# Patient Record
Sex: FEMALE | Race: WHITE | NOT HISPANIC OR LATINO | Employment: OTHER | URBAN - NONMETROPOLITAN AREA
[De-identification: names, ages, dates, MRNs, and addresses within clinical notes are randomized per-mention and may not be internally consistent; named-entity substitution may affect disease eponyms.]

---

## 2017-01-11 ENCOUNTER — OFFICE VISIT (OUTPATIENT)
Dept: PODIATRY | Facility: CLINIC | Age: 42
End: 2017-01-11

## 2017-01-11 VITALS
DIASTOLIC BLOOD PRESSURE: 76 MMHG | SYSTOLIC BLOOD PRESSURE: 132 MMHG | WEIGHT: 196 LBS | HEIGHT: 64 IN | HEART RATE: 100 BPM | BODY MASS INDEX: 33.46 KG/M2

## 2017-01-11 DIAGNOSIS — M21.861 GASTROCNEMIUS EQUINUS, RIGHT: ICD-10-CM

## 2017-01-11 DIAGNOSIS — M72.2 PLANTAR FASCIA SYNDROME: Primary | ICD-10-CM

## 2017-01-11 PROBLEM — M21.869 GASTROCNEMIUS EQUINUS: Status: ACTIVE | Noted: 2017-01-11

## 2017-01-11 PROBLEM — M62.469 GASTROCNEMIUS EQUINUS: Status: ACTIVE | Noted: 2017-01-11

## 2017-01-11 PROCEDURE — 99212 OFFICE O/P EST SF 10 MIN: CPT | Performed by: PODIATRIST

## 2017-01-11 NOTE — MR AVS SNAPSHOT
Marjorie Barnett   1/11/2017 10:00 AM   Office Visit    Dept Phone:  [phone]   Encounter #:  [id number]    Provider:  Joss Kirkland DPM   Department:  [facility] P[facility]                Your Full Care Plan              Your Updated Medication List          This list is accurate as of: 1/11/17 10:05 AM.  Always use your most recent med list.                ALPRAZolam 0.5 MG tablet   Commonly known as:  XANAX       amoxicillin-clavulanate 500-125 MG per tablet   Commonly known as:  AUGMENTIN   Take 1 tablet by mouth 2 (Two) Times a Day.       azelastine 0.05 % ophthalmic solution   Commonly known as:  OPTIVAR       bumetanide 1 MG tablet   Commonly known as:  BUMEX   TAKE ONE TABLET DAILY GENERIC FOR BUMEX       cetirizine 10 MG tablet   Commonly known as:  zyrTEC       fluvoxaMINE 100 MG tablet   Commonly known as:  LUVOX       MICROGESTIN FE 1/20 1-20 MG-MCG per tablet   Generic drug:  norethindrone-ethinyl estradiol       sulfamethoxazole-trimethoprim 800-160 MG per tablet   Commonly known as:  BACTRIM DS,SEPTRA DS   Take 1 tablet by mouth 2 (Two) Times a Day.       SYNTHROID 100 MCG tablet   Generic drug:  levothyroxine   TAKE ONE TABLET DAILY (BRAND NAME PER MD)               You Were Diagnosed With        Codes Comments    Plantar fascia syndrome    -  Primary ICD-10-CM: M72.2  ICD-9-CM: 728.71       Instructions    Plantar Fasciitis With Rehab  The plantar fascia is a fibrous, ligament-like, soft-tissue structure that spans the bottom of the foot. Plantar fasciitis, also called heel spur syndrome, is a condition that causes pain in the foot due to inflammation of the tissue.  SYMPTOMS   · Pain and tenderness on the underneath side of the foot.  · Pain that worsens with standing or walking.  CAUSES   Plantar fasciitis is caused by irritation and injury to the plantar fascia on the underneath side of the foot. Common mechanisms of injury include:  · Direct trauma  to bottom of the foot.  · Damage to a small nerve that runs under the foot where the main fascia attaches to the heel bone.  · Stress placed on the plantar fascia due to bone spurs.  RISK INCREASES WITH:   · Activities that place stress on the plantar fascia (running, jumping, pivoting, or cutting).  · Poor strength and flexibility.  · Improperly fitted shoes.  · Tight calf muscles.  · Flat feet.  · Failure to warm-up properly before activity.  · Obesity.  PREVENTION  · Warm up and stretch properly before activity.  · Allow for adequate recovery between workouts.  · Maintain physical fitness:    Strength, flexibility, and endurance.    Cardiovascular fitness.  · Maintain a health body weight.  · Avoid stress on the plantar fascia.  · Wear properly fitted shoes, including arch supports for individuals who have flat feet.  PROGNOSIS   If treated properly, then the symptoms of plantar fasciitis usually resolve without surgery. However, occasionally surgery is necessary.  RELATED COMPLICATIONS   · Recurrent symptoms that may result in a chronic condition.  · Problems of the lower back that are caused by compensating for the injury, such as limping.  · Pain or weakness of the foot during push-off following surgery.  · Chronic inflammation, scarring, and partial or complete fascia tear, occurring more often from repeated injections.  TREATMENT   Treatment initially involves the use of ice and medication to help reduce pain and inflammation. The use of strengthening and stretching exercises may help reduce pain with activity, especially stretches of the Achilles tendon. These exercises may be performed at home or with a therapist. Your caregiver may recommend that you use heel cups of arch supports to help reduce stress on the plantar fascia. Occasionally, corticosteroid injections are given to reduce inflammation. If symptoms persist for greater than 6 months despite non-surgical (conservative), then surgery may be  recommended.   MEDICATION   · If pain medication is necessary, then nonsteroidal anti-inflammatory medications, such as aspirin and ibuprofen, or other minor pain relievers, such as acetaminophen, are often recommended.  · Do not take pain medication within 7 days before surgery.  · Prescription pain relievers may be given if deemed necessary by your caregiver. Use only as directed and only as much as you need.  · Corticosteroid injections may be given by your caregiver. These injections should be reserved for the most serious cases, because they may only be given a certain number of times.  HEAT AND COLD  · Cold treatment (icing) relieves pain and reduces inflammation. Cold treatment should be applied for 10 to 15 minutes every 2 to 3 hours for inflammation and pain and immediately after any activity that aggravates your symptoms. Use ice packs or massage the area with a piece of ice (ice massage).  · Heat treatment may be used prior to performing the stretching and strengthening activities prescribed by your caregiver, physical therapist, or . Use a heat pack or soak the injury in warm water.  SEEK IMMEDIATE MEDICAL CARE IF:  · Treatment seems to offer no benefit, or the condition worsens.  · Any medications produce adverse side effects.  EXERCISES  RANGE OF MOTION (ROM) AND STRETCHING EXERCISES - Plantar Fasciitis (Heel Spur Syndrome)  These exercises may help you when beginning to rehabilitate your injury. Your symptoms may resolve with or without further involvement from your physician, physical therapist or . While completing these exercises, remember:   · Restoring tissue flexibility helps normal motion to return to the joints. This allows healthier, less painful movement and activity.  · An effective stretch should be held for at least 30 seconds.  · A stretch should never be painful. You should only feel a gentle lengthening or release in the stretched tissue.  RANGE OF  MOTION - Toe Extension, Flexion  · Sit with your right / left leg crossed over your opposite knee.  · Grasp your toes and gently pull them back toward the top of your foot. You should feel a stretch on the bottom of your toes and/or foot.  · Hold this stretch for __________ seconds.  · Now, gently pull your toes toward the bottom of your foot. You should feel a stretch on the top of your toes and or foot.  · Hold this stretch for __________ seconds.  Repeat __________ times. Complete this stretch __________ times per day.   RANGE OF MOTION - Ankle Dorsiflexion, Active Assisted  · Remove shoes and sit on a chair that is preferably not on a carpeted surface.  · Place right / left foot under knee. Extend your opposite leg for support.  · Keeping your heel down, slide your right / left foot back toward the chair until you feel a stretch at your ankle or calf. If you do not feel a stretch, slide your bottom forward to the edge of the chair, while still keeping your heel down.  · Hold this stretch for __________ seconds.  Repeat __________ times. Complete this stretch __________ times per day.   STRETCH - Gastroc, Standing  · Place hands on wall.  · Extend right / left leg, keeping the front knee somewhat bent.  · Slightly point your toes inward on your back foot.  · Keeping your right / left heel on the floor and your knee straight, shift your weight toward the wall, not allowing your back to arch.  · You should feel a gentle stretch in the right / left calf. Hold this position for __________ seconds.  Repeat __________ times. Complete this stretch __________ times per day.  STRETCH - Soleus, Standing  · Place hands on wall.  · Extend right / left leg, keeping the other knee somewhat bent.  · Slightly point your toes inward on your back foot.  · Keep your right / left heel on the floor, bend your back knee, and slightly shift your weight over the back leg so that you feel a gentle stretch deep in your back calf.  · Hold  this position for __________ seconds.  Repeat __________ times. Complete this stretch __________ times per day.  STRETCH - Gastrocsoleus, Standing   Note: This exercise can place a lot of stress on your foot and ankle. Please complete this exercise only if specifically instructed by your caregiver.   · Place the ball of your right / left foot on a step, keeping your other foot firmly on the same step.  · Hold on to the wall or a rail for balance.  · Slowly lift your other foot, allowing your body weight to press your heel down over the edge of the step.  · You should feel a stretch in your right / left calf.  · Hold this position for __________ seconds.  · Repeat this exercise with a slight bend in your right / left knee.  Repeat __________ times. Complete this stretch __________ times per day.   STRENGTHENING EXERCISES - Plantar Fasciitis (Heel Spur Syndrome)   These exercises may help you when beginning to rehabilitate your injury. They may resolve your symptoms with or without further involvement from your physician, physical therapist or . While completing these exercises, remember:   · Muscles can gain both the endurance and the strength needed for everyday activities through controlled exercises.  · Complete these exercises as instructed by your physician, physical therapist or . Progress the resistance and repetitions only as guided.  STRENGTH - Towel Curls  · Sit in a chair positioned on a non-carpeted surface.  · Place your foot on a towel, keeping your heel on the floor.  · Pull the towel toward your heel by only curling your toes. Keep your heel on the floor.  · If instructed by your physician, physical therapist or , add ____________________ at the end of the towel.  Repeat __________ times. Complete this exercise __________ times per day.  STRENGTH - Ankle Inversion  · Secure one end of a rubber exercise band/tubing to a fixed object (table, pole). Loop  "the other end around your foot just before your toes.  · Place your fists between your knees. This will focus your strengthening at your ankle.  · Slowly, pull your big toe up and in, making sure the band/tubing is positioned to resist the entire motion.  · Hold this position for __________ seconds.  · Have your muscles resist the band/tubing as it slowly pulls your foot back to the starting position.  Repeat __________ times. Complete this exercises __________ times per day.      This information is not intended to replace advice given to you by your health care provider. Make sure you discuss any questions you have with your health care provider.     Document Released: 12/18/2006 Document Revised: 05/03/2016 Document Reviewed: 04/01/2010  Elsevier Interactive Patient Education ©2016 [employer]       Patient Instructions History      Upcoming Appointments     Visit Type Date Time Department    FOLLOW UP 1/11/2017 10:00 AM [facility]      MyChart Signup     Our records indicate that you have declined [employer] signup. If you would like to sign up for [employer], please email [email or web address] or call [phone] to obtain an activation code.             Other Info from Your Visit           Allergies     Codeine        Reason for Visit     Follow-up right foot pain-seems to be getting better but still flares      Vital Signs     Blood Pressure Pulse Height Weight Body Mass Index Smoking Status    132/76 100 64\" (162.6 cm) 196 lb (88.9 kg) 33.64 kg/m2 Never Smoker      Problems and Diagnoses Noted     Plantar fascia syndrome        "

## 2017-01-11 NOTE — PATIENT INSTRUCTIONS
Plantar Fasciitis With Rehab  The plantar fascia is a fibrous, ligament-like, soft-tissue structure that spans the bottom of the foot. Plantar fasciitis, also called heel spur syndrome, is a condition that causes pain in the foot due to inflammation of the tissue.  SYMPTOMS   · Pain and tenderness on the underneath side of the foot.  · Pain that worsens with standing or walking.  CAUSES   Plantar fasciitis is caused by irritation and injury to the plantar fascia on the underneath side of the foot. Common mechanisms of injury include:  · Direct trauma to bottom of the foot.  · Damage to a small nerve that runs under the foot where the main fascia attaches to the heel bone.  · Stress placed on the plantar fascia due to bone spurs.  RISK INCREASES WITH:   · Activities that place stress on the plantar fascia (running, jumping, pivoting, or cutting).  · Poor strength and flexibility.  · Improperly fitted shoes.  · Tight calf muscles.  · Flat feet.  · Failure to warm-up properly before activity.  · Obesity.  PREVENTION  · Warm up and stretch properly before activity.  · Allow for adequate recovery between workouts.  · Maintain physical fitness:    Strength, flexibility, and endurance.    Cardiovascular fitness.  · Maintain a health body weight.  · Avoid stress on the plantar fascia.  · Wear properly fitted shoes, including arch supports for individuals who have flat feet.  PROGNOSIS   If treated properly, then the symptoms of plantar fasciitis usually resolve without surgery. However, occasionally surgery is necessary.  RELATED COMPLICATIONS   · Recurrent symptoms that may result in a chronic condition.  · Problems of the lower back that are caused by compensating for the injury, such as limping.  · Pain or weakness of the foot during push-off following surgery.  · Chronic inflammation, scarring, and partial or complete fascia tear, occurring more often from repeated injections.  TREATMENT   Treatment initially involves  the use of ice and medication to help reduce pain and inflammation. The use of strengthening and stretching exercises may help reduce pain with activity, especially stretches of the Achilles tendon. These exercises may be performed at home or with a therapist. Your caregiver may recommend that you use heel cups of arch supports to help reduce stress on the plantar fascia. Occasionally, corticosteroid injections are given to reduce inflammation. If symptoms persist for greater than 6 months despite non-surgical (conservative), then surgery may be recommended.   MEDICATION   · If pain medication is necessary, then nonsteroidal anti-inflammatory medications, such as aspirin and ibuprofen, or other minor pain relievers, such as acetaminophen, are often recommended.  · Do not take pain medication within 7 days before surgery.  · Prescription pain relievers may be given if deemed necessary by your caregiver. Use only as directed and only as much as you need.  · Corticosteroid injections may be given by your caregiver. These injections should be reserved for the most serious cases, because they may only be given a certain number of times.  HEAT AND COLD  · Cold treatment (icing) relieves pain and reduces inflammation. Cold treatment should be applied for 10 to 15 minutes every 2 to 3 hours for inflammation and pain and immediately after any activity that aggravates your symptoms. Use ice packs or massage the area with a piece of ice (ice massage).  · Heat treatment may be used prior to performing the stretching and strengthening activities prescribed by your caregiver, physical therapist, or . Use a heat pack or soak the injury in warm water.  SEEK IMMEDIATE MEDICAL CARE IF:  · Treatment seems to offer no benefit, or the condition worsens.  · Any medications produce adverse side effects.  EXERCISES  RANGE OF MOTION (ROM) AND STRETCHING EXERCISES - Plantar Fasciitis (Heel Spur Syndrome)  These exercises may  help you when beginning to rehabilitate your injury. Your symptoms may resolve with or without further involvement from your physician, physical therapist or . While completing these exercises, remember:   · Restoring tissue flexibility helps normal motion to return to the joints. This allows healthier, less painful movement and activity.  · An effective stretch should be held for at least 30 seconds.  · A stretch should never be painful. You should only feel a gentle lengthening or release in the stretched tissue.  RANGE OF MOTION - Toe Extension, Flexion  · Sit with your right / left leg crossed over your opposite knee.  · Grasp your toes and gently pull them back toward the top of your foot. You should feel a stretch on the bottom of your toes and/or foot.  · Hold this stretch for __________ seconds.  · Now, gently pull your toes toward the bottom of your foot. You should feel a stretch on the top of your toes and or foot.  · Hold this stretch for __________ seconds.  Repeat __________ times. Complete this stretch __________ times per day.   RANGE OF MOTION - Ankle Dorsiflexion, Active Assisted  · Remove shoes and sit on a chair that is preferably not on a carpeted surface.  · Place right / left foot under knee. Extend your opposite leg for support.  · Keeping your heel down, slide your right / left foot back toward the chair until you feel a stretch at your ankle or calf. If you do not feel a stretch, slide your bottom forward to the edge of the chair, while still keeping your heel down.  · Hold this stretch for __________ seconds.  Repeat __________ times. Complete this stretch __________ times per day.   STRETCH - Gastroc, Standing  · Place hands on wall.  · Extend right / left leg, keeping the front knee somewhat bent.  · Slightly point your toes inward on your back foot.  · Keeping your right / left heel on the floor and your knee straight, shift your weight toward the wall, not allowing your  back to arch.  · You should feel a gentle stretch in the right / left calf. Hold this position for __________ seconds.  Repeat __________ times. Complete this stretch __________ times per day.  STRETCH - Soleus, Standing  · Place hands on wall.  · Extend right / left leg, keeping the other knee somewhat bent.  · Slightly point your toes inward on your back foot.  · Keep your right / left heel on the floor, bend your back knee, and slightly shift your weight over the back leg so that you feel a gentle stretch deep in your back calf.  · Hold this position for __________ seconds.  Repeat __________ times. Complete this stretch __________ times per day.  STRETCH - Gastrocsoleus, Standing   Note: This exercise can place a lot of stress on your foot and ankle. Please complete this exercise only if specifically instructed by your caregiver.   · Place the ball of your right / left foot on a step, keeping your other foot firmly on the same step.  · Hold on to the wall or a rail for balance.  · Slowly lift your other foot, allowing your body weight to press your heel down over the edge of the step.  · You should feel a stretch in your right / left calf.  · Hold this position for __________ seconds.  · Repeat this exercise with a slight bend in your right / left knee.  Repeat __________ times. Complete this stretch __________ times per day.   STRENGTHENING EXERCISES - Plantar Fasciitis (Heel Spur Syndrome)   These exercises may help you when beginning to rehabilitate your injury. They may resolve your symptoms with or without further involvement from your physician, physical therapist or . While completing these exercises, remember:   · Muscles can gain both the endurance and the strength needed for everyday activities through controlled exercises.  · Complete these exercises as instructed by your physician, physical therapist or . Progress the resistance and repetitions only as  guided.  STRENGTH - Towel Curls  · Sit in a chair positioned on a non-carpeted surface.  · Place your foot on a towel, keeping your heel on the floor.  · Pull the towel toward your heel by only curling your toes. Keep your heel on the floor.  · If instructed by your physician, physical therapist or , add ____________________ at the end of the towel.  Repeat __________ times. Complete this exercise __________ times per day.  STRENGTH - Ankle Inversion  · Secure one end of a rubber exercise band/tubing to a fixed object (table, pole). Loop the other end around your foot just before your toes.  · Place your fists between your knees. This will focus your strengthening at your ankle.  · Slowly, pull your big toe up and in, making sure the band/tubing is positioned to resist the entire motion.  · Hold this position for __________ seconds.  · Have your muscles resist the band/tubing as it slowly pulls your foot back to the starting position.  Repeat __________ times. Complete this exercises __________ times per day.      This information is not intended to replace advice given to you by your health care provider. Make sure you discuss any questions you have with your health care provider.     Document Released: 12/18/2006 Document Revised: 05/03/2016 Document Reviewed: 04/01/2010  Elsevier Interactive Patient Education ©2016 [employer]

## 2017-01-11 NOTE — PROGRESS NOTES
Today's Date: 2017    Marjorie Barnett  MRN: [id number]  CSN: [id number]  PCP: Jacob Simms MD      SUBJECTIVE     Chief Complaint   Patient presents with   • Follow-up     right foot pain-seems to be getting better but still flares     HPI: Marjorie Barnett, a 41 y.o.female, presents to clinic as a(n) established patient complaining of right heel pain. States that she has been non compliant in stretching but has iced daily and used occasional NSAIDs. Says her pain is down to a 3/10 on a daily basis but will be more painful after wearing heels or working out. Denies any constitutional symptoms. No other pedal complaints at this time.    Past Medical History   Diagnosis Date   • Bunion, right    • Cystitis    • Disease of thyroid gland    • Hypercholesteremia    • Plantar fascia syndrome        Past Surgical History   Procedure Laterality Date   • Cholecystectomy     •  section       x2       Family History   Problem Relation Age of Onset   • Diabetes Mother    • Cancer Mother      kidney   • Kidney disease Mother    • Hyperlipidemia Mother    • Cancer Father      skin   • Hyperlipidemia Father        Social History     Social History   • Marital status:      Spouse name: N/A   • Number of children: N/A   • Years of education: N/A     Occupational History   • Not on file.     Social History Main Topics   • Smoking status: Never Smoker   • Smokeless tobacco: Never Used   • Alcohol use Yes      Comment: occisonally   • Drug use: No   • Sexual activity: Defer     Other Topics Concern   • Not on file     Social History Narrative       Allergies   Allergen Reactions   • Codeine        Prior to Admission medications    Medication Sig Start Date End Date Taking? Authorizing Provider   ALPRAZolam (XANAX) 0.5 MG tablet TK 1 T PO TID 16  Yes Historical Provider, MD   amoxicillin-clavulanate (AUGMENTIN) 500-125 MG per tablet Take 1 tablet by mouth 2 (Two) Times a Day. 16  Yes Jacob  Sharon Simms MD   azelastine (OPTIVAR) 0.05 % ophthalmic solution INSTILL 1 DROP TWICE DAILY INTO BOTH EYES 11/21/16  Yes Historical Provider, MD   bumetanide (BUMEX) 1 MG tablet TAKE ONE TABLET DAILY GENERIC FOR BUMEX 12/1/16  Yes Jacob Simms MD   cetirizine (zyrTEC) 10 MG tablet Take 10 mg by mouth Daily.   Yes Historical Provider, MD   fluvoxaMINE (LUVOX) 100 MG tablet Take 100 mg by mouth Daily. 11/21/16  Yes Historical Provider, MD   MICROGESTIN FE 1/20 1-20 MG-MCG per tablet TK 1 T PO QD 11/21/16  Yes Historical Provider, MD   sulfamethoxazole-trimethoprim (BACTRIM DS,SEPTRA DS) 800-160 MG per tablet Take 1 tablet by mouth 2 (Two) Times a Day. 11/30/16  Yes Jacob Simms MD   SYNTHROID 100 MCG tablet TAKE ONE TABLET DAILY (BRAND NAME PER MD) 9/7/16  Yes Jacob Simms MD       Review of Systems   Constitutional: Negative for chills and fever.   Respiratory: Negative for shortness of breath.    Cardiovascular: Negative for chest pain and leg swelling.   Gastrointestinal: Negative for constipation, diarrhea, nausea and vomiting.   Musculoskeletal:        Foot pain   Skin: Negative for wound.       OBJECTIVE     Vitals:    01/11/17 0952   BP: 132/76   Pulse: 100       PHYSICAL EXAM  GEN:   A&Ox3, NAD. Pt ambulates to clinic without assistance and wearing athletic shoes.    NEURO:   Protective sensation intact to 10/10 sites Right foot, 10/10 site Left foot using Rochelle-Radha monofilament  Light touch sensation intact.  No Tinel's or Villeux sign.    VASC:  Skin temperature warm to warm proximal to distal mary  DP pulses 2/4 Right, 2/4 Left  PT pulses 2/4 Right, 2/4 Left  CFT <3sec mary  No edema noted mary  No varicosities noted mary    MUSC/SKEL:  Muscle Strength Right foot Dorsiflexors 5/5, Plantarflexors 5/5, Evertors 5/5, Invertors 5/5  Muscle Strength Left foot Dorsiflexors 5/5, Plantarflexors 5/5, Evertors 5/5, Invertors 5/5  POP of medial calcaneal tubercle of the right foot -  decreased from previous visit. No pain along the Achilles tendon or Achilles insertion. No pain along the medial band plantar fasciitis.  Decreased right AJ ROM with knee extended.    DERM:  Pedal nails 1-5 mary are within normal limits of length and thickness  Webspaces are c/d/i  Skin is supple with no open wounds or sores appreciated.      PATHOLOGY RESULTS:      RADIOLOGY/NUCLEAR:      LABORATORY/CULTURE RESULTS:     Lab Results (last 24 hours)     ** No results found for the last 24 hours. **          ASSESSMENT/PLAN     Patient Active Problem List   Diagnosis   • Plantar fascia syndrome   • Cystitis   • Gastrocnemius equinus         ICD-10-CM ICD-9-CM   1. Plantar fascia syndrome M72.2 728.71   2. Gastrocnemius equinus, right M21.6X1 736.72     -Pt was examined and evaluated  -Discussed findings and treatment options with patient in detail  -Continue current treatment plan with emphasis on stretching  -possible steroid injection at next visit if no improvement made  -RTC 1 month(s), or sooner if acute issues arise.              Please note that portions of this note may have been completed with a voice recognition program. Efforts were made to edit the dictations, but occasionally words are mistranscribed.

## 2017-03-02 ENCOUNTER — TELEPHONE (OUTPATIENT)
Dept: FAMILY MEDICINE CLINIC | Facility: CLINIC | Age: 42
End: 2017-03-02

## 2017-03-03 RX ORDER — ALBUTEROL SULFATE 90 UG/1
2 AEROSOL, METERED RESPIRATORY (INHALATION) EVERY 4 HOURS PRN
Qty: 8 INHALER | Refills: 1 | Status: SHIPPED | OUTPATIENT
Start: 2017-03-03

## 2017-03-07 ENCOUNTER — TELEPHONE (OUTPATIENT)
Dept: FAMILY MEDICINE CLINIC | Facility: CLINIC | Age: 42
End: 2017-03-07

## 2017-03-07 RX ORDER — AMOXICILLIN 500 MG/1
500 CAPSULE ORAL 3 TIMES DAILY
Qty: 21 CAPSULE | Refills: 0 | Status: SHIPPED | OUTPATIENT
Start: 2017-03-07 | End: 2017-03-14

## 2017-03-07 NOTE — TELEPHONE ENCOUNTER
Pt called said that she thinks that she has strep throat ha,chest and head congestion she wants to know if u will call in meds md2 [phone]

## 2017-03-09 ENCOUNTER — TELEPHONE (OUTPATIENT)
Dept: FAMILY MEDICINE CLINIC | Facility: CLINIC | Age: 42
End: 2017-03-09

## 2017-03-17 RX ORDER — LEVOTHYROXINE SODIUM 100 MCG
TABLET ORAL
Qty: 30 TABLET | Refills: 1 | Status: SHIPPED | OUTPATIENT
Start: 2017-03-17 | End: 2017-05-20 | Stop reason: SDUPTHER

## 2017-05-22 RX ORDER — LEVOTHYROXINE SODIUM 100 MCG
TABLET ORAL
Qty: 30 TABLET | Refills: 0 | Status: SHIPPED | OUTPATIENT
Start: 2017-05-22 | End: 2017-07-26 | Stop reason: SDUPTHER

## 2017-06-05 PROCEDURE — 80053 COMPREHEN METABOLIC PANEL: CPT | Performed by: NURSE PRACTITIONER

## 2017-06-05 PROCEDURE — 83540 ASSAY OF IRON: CPT | Performed by: NURSE PRACTITIONER

## 2017-06-05 PROCEDURE — 82728 ASSAY OF FERRITIN: CPT | Performed by: NURSE PRACTITIONER

## 2017-06-05 PROCEDURE — 83550 IRON BINDING TEST: CPT | Performed by: NURSE PRACTITIONER

## 2017-06-05 PROCEDURE — 85025 COMPLETE CBC W/AUTO DIFF WBC: CPT | Performed by: NURSE PRACTITIONER

## 2017-06-05 PROCEDURE — 82746 ASSAY OF FOLIC ACID SERUM: CPT | Performed by: NURSE PRACTITIONER

## 2017-06-05 PROCEDURE — 87070 CULTURE OTHR SPECIMN AEROBIC: CPT | Performed by: NURSE PRACTITIONER

## 2017-06-26 ENCOUNTER — LAB (OUTPATIENT)
Dept: LAB | Facility: HOSPITAL | Age: 42
End: 2017-06-26

## 2017-06-26 DIAGNOSIS — N39.0 RECURRENT UTI: ICD-10-CM

## 2017-06-26 DIAGNOSIS — R30.0 DYSURIA: Primary | ICD-10-CM

## 2017-06-26 DIAGNOSIS — R30.0 DYSURIA: ICD-10-CM

## 2017-06-26 LAB
BACTERIA UR QL AUTO: ABNORMAL /HPF
BILIRUB UR QL STRIP: NEGATIVE
CLARITY UR: ABNORMAL
COLOR UR: YELLOW
GLUCOSE UR STRIP-MCNC: NEGATIVE MG/DL
HGB UR QL STRIP.AUTO: ABNORMAL
HYALINE CASTS UR QL AUTO: ABNORMAL /LPF
KETONES UR QL STRIP: NEGATIVE
LEUKOCYTE ESTERASE UR QL STRIP.AUTO: ABNORMAL
NITRITE UR QL STRIP: NEGATIVE
PH UR STRIP.AUTO: 7 [PH] (ref 5–8)
PROT UR QL STRIP: ABNORMAL
RBC # UR: ABNORMAL /HPF
REF LAB TEST METHOD: ABNORMAL
SP GR UR STRIP: 1.03 (ref 1–1.03)
SQUAMOUS #/AREA URNS HPF: ABNORMAL /HPF
UROBILINOGEN UR QL STRIP: ABNORMAL
WBC UR QL AUTO: ABNORMAL /HPF

## 2017-06-26 PROCEDURE — 87086 URINE CULTURE/COLONY COUNT: CPT | Performed by: NURSE PRACTITIONER

## 2017-06-26 PROCEDURE — 81001 URINALYSIS AUTO W/SCOPE: CPT | Performed by: NURSE PRACTITIONER

## 2017-06-27 DIAGNOSIS — R10.9 ACUTE LEFT FLANK PAIN: Primary | ICD-10-CM

## 2017-06-27 DIAGNOSIS — N39.0 RECURRENT UTI: ICD-10-CM

## 2017-06-28 LAB — BACTERIA SPEC AEROBE CULT: ABNORMAL

## 2017-06-28 RX ORDER — CIPROFLOXACIN 500 MG/1
500 TABLET, FILM COATED ORAL 2 TIMES DAILY
Qty: 14 TABLET | Refills: 0 | Status: SHIPPED | OUTPATIENT
Start: 2017-06-28 | End: 2017-07-05

## 2017-06-29 ENCOUNTER — HOSPITAL ENCOUNTER (OUTPATIENT)
Dept: CT IMAGING | Facility: HOSPITAL | Age: 42
Discharge: HOME | End: 2017-06-29
Admitting: PHYSICIAN ASSISTANT

## 2017-06-29 DIAGNOSIS — R10.2 PELVIC PAIN: ICD-10-CM

## 2017-06-29 DIAGNOSIS — R10.9 FLANK PAIN: ICD-10-CM

## 2017-06-29 DIAGNOSIS — N39.0 RECURRENT UTI: ICD-10-CM

## 2017-06-29 DIAGNOSIS — N39.0 RECURRENT UTI: Primary | ICD-10-CM

## 2017-06-29 LAB — CREAT BLDA-MCNC: 0.8 MG/DL (ref 0.6–1.3)

## 2017-06-29 PROCEDURE — 0 IOPAMIDOL PER 1 ML: Performed by: PHYSICIAN ASSISTANT

## 2017-06-29 PROCEDURE — 74178 CT ABD&PLV WO CNTR FLWD CNTR: CPT

## 2017-06-29 PROCEDURE — 82565 ASSAY OF CREATININE: CPT

## 2017-06-29 RX ORDER — LACTULOSE 10 G/15ML
20 SOLUTION ORAL 2 TIMES DAILY PRN
Qty: 473 ML | Refills: 1 | Status: SHIPPED | OUTPATIENT
Start: 2017-06-29 | End: 2017-07-17

## 2017-06-29 RX ADMIN — IOPAMIDOL 100 ML: 755 INJECTION, SOLUTION INTRAVENOUS at 10:00

## 2017-07-11 DIAGNOSIS — N39.0 RECURRENT UTI: Primary | ICD-10-CM

## 2017-07-11 NOTE — PROGRESS NOTES
Marjorie Barnett is a 41-year-old female with recurrent UTIs over the last year. Symptoms include dysuria and urinary urgency.  She has been seen by multiple providers over the last year due to her complaints. Lab work and diagnostic scans have mainly been performed at [facility]. Most recently, pt c/o left flank pain in addition to other named symptoms. A CT scan of the abd with and without contrast showed a thickened bladder wall. Non-obstructing punctate nephrolithiasis was noted at the inferior pole of the right kidney. There was septation with low-attenuation nodularity along the superior margin of the uterine fundus for which GYN referral was requested.  She saw Dr. Jacqueline Cisneros for evaluation, however we have been unable to obtain medical records despite multiple attempts .     At this time, I have suggested the patient see Urology for evaluation due to her persistent and recurring symptoms.

## 2017-07-12 ENCOUNTER — TELEPHONE (OUTPATIENT)
Dept: UROLOGY | Facility: CLINIC | Age: 42
End: 2017-07-12

## 2017-07-12 DIAGNOSIS — N20.0 KIDNEY STONE: Primary | ICD-10-CM

## 2017-07-13 ENCOUNTER — HOSPITAL ENCOUNTER (OUTPATIENT)
Dept: GENERAL RADIOLOGY | Facility: HOSPITAL | Age: 42
Discharge: HOME | End: 2017-07-13
Attending: UROLOGY | Admitting: UROLOGY

## 2017-07-13 PROCEDURE — 74000 HC ABDOMEN KUB: CPT

## 2017-07-17 ENCOUNTER — OFFICE VISIT (OUTPATIENT)
Dept: UROLOGY | Facility: CLINIC | Age: 42
End: 2017-07-17

## 2017-07-17 VITALS
WEIGHT: 176 LBS | SYSTOLIC BLOOD PRESSURE: 122 MMHG | TEMPERATURE: 98.6 F | HEIGHT: 63 IN | BODY MASS INDEX: 31.18 KG/M2 | DIASTOLIC BLOOD PRESSURE: 80 MMHG

## 2017-07-17 DIAGNOSIS — N20.0 RENAL CALCULUS, RIGHT: ICD-10-CM

## 2017-07-17 DIAGNOSIS — N39.0 RECURRENT UTI: Primary | ICD-10-CM

## 2017-07-17 DIAGNOSIS — N39.41 URGE INCONTINENCE: ICD-10-CM

## 2017-07-17 LAB
BILIRUB BLD-MCNC: NEGATIVE MG/DL
CLARITY, POC: CLEAR
COLOR UR: YELLOW
GLUCOSE UR STRIP-MCNC: NEGATIVE MG/DL
KETONES UR QL: NEGATIVE
LEUKOCYTE EST, POC: ABNORMAL
NITRITE UR-MCNC: NEGATIVE MG/ML
PH UR: 6.5 [PH] (ref 5–8)
PROT UR STRIP-MCNC: NEGATIVE MG/DL
RBC # UR STRIP: ABNORMAL /UL
SP GR UR: 1.02 (ref 1–1.03)
UROBILINOGEN UR QL: NORMAL

## 2017-07-17 PROCEDURE — 87086 URINE CULTURE/COLONY COUNT: CPT | Performed by: UROLOGY

## 2017-07-17 PROCEDURE — 51702 INSERT TEMP BLADDER CATH: CPT | Performed by: UROLOGY

## 2017-07-17 PROCEDURE — 99204 OFFICE O/P NEW MOD 45 MIN: CPT | Performed by: UROLOGY

## 2017-07-17 PROCEDURE — 81001 URINALYSIS AUTO W/SCOPE: CPT | Performed by: UROLOGY

## 2017-07-17 PROCEDURE — 51798 US URINE CAPACITY MEASURE: CPT | Performed by: UROLOGY

## 2017-07-17 RX ORDER — NITROFURANTOIN MACROCRYSTALS 50 MG/1
50 CAPSULE ORAL NIGHTLY
Qty: 90 CAPSULE | Refills: 0 | Status: SHIPPED | OUTPATIENT
Start: 2017-07-17 | End: 2017-08-15

## 2017-07-17 NOTE — PATIENT INSTRUCTIONS
"UTI PREVENTION  In an era of increasing antibiotic resistant bacteria, the use of \"complementary measures \"to prevent urinary tract infection is smart for all female patients that are bothered by recurrent episodes of bladder infections.  Studies have  shown this to be most effective in women that are between the ages of 18-55.  Important measures are aimed at reducing the opportunity for bacteria to bind the inner lining of the bladder.  Good hygiene measures are a must.  Bacteria  often gain access to the bladder from sexual activity.  While no one likes to think of it this way, the bacteria actually come from your own colon and gain access to the vagina during intercourse.  Once the bacteria begin to divide there, they gain access to the bladder through the short urethra.  Inside the bladder they attach the wall and begin to divide again.  Measures aimed at  preventing urinary tract infection involve reducing the time bacteria are in the colon (preventing constipation), reducing the number bacteria inside the vagina and bladder after sexual activity (cleaning the perineum and outer aspect vagina and urination before and after intercourse), and preventing the bacteria from binding the bladder wall (a substance in cranberry juice can do this).  Considering these factors, I recommend:  #1.  Using an effective bowel program to prevent constipation.  Fluid intake is very important to keep from having dry, firm stools.  It is important to drink plenty of water.  You should also eat foods with significant fiber amounts such as beans, prunes, fruits, and vegetables.  Metamucil is an excellent source of fiber.  Start using 1 teaspoon daily and a large glass of water.  You can increase this to 3 times daily.  It may take a while before you tolerate this well because mild abdominal cramping is not unusual at first.  A gentle laxative such as milk of magnesia baby needed occasionally a few go more than a couple days without " "a bowel movement.  Using up to 2 capfuls of  MiraLAX in drink of choice is also very reasonable.  If these measures are ineffective, consider discussing of bowel program with your primary care physician who may even recommend referral to a gastroenterologist. As Urologists, we stick to evaluation of the Urinary tract.     #2.  Use good hygiene after sexual activity.  Be sure to use a mild diaper wipes to clean the outer portion of the genitals and the inner tip around the vaginal canal especially around the urethra.    #3.  Urinate before and after intercourse.  This is your body's most effective form of infection fighting mechanism.  It important to do this before the bacteria have adequate time to attach the wall the bladder.    #4.  Use cranberry juice to prevent bacteria from binding the wall of the bladder.  Cranberry juice contains proanthocyanadin, which prevents the bacteria from attaching to the wall the bladder.  I recommend using between 3 and 15 ounces the am sweetened Cranberry cocktail juice or 1 ounce of the frozen unsweetened concentrate daily.  While there are many \"pill \"forms of cranberry, they have not been shown to be as effective or studied well enough for me to recommend.  For cranberry juice to work, you must drink enough water to increase your urine output.  Please remember that if you think you have an infection, increasing the amount of cranberry juice taken does not appear to be effective treatment.    #5.  If you think you have an infection, you should see a care provider immediately.  This office will not call in antibiotics by phone for symptoms except in rare instances.  We will however attempt to work you in to see a provider and obtain a culture, probably as a catheterized specimen, if possible. If not, we recommend the [facility] [place] [facility] facility  "

## 2017-07-17 NOTE — PROGRESS NOTES
Ms. Barnett is 41 y.o. female    CHIEF COMPLAINT: I am here for frequent utis, incontinence, and kidney stones.     HPI  Recurrent UTI  Patient is here for recurrent UTI.  This is a(an) intial visit. Possible coexisting conditions or situations that may predispose the patient to urinary tract include no obvious predisposition. The infections have been occurring for 1year(s).  There has been 5 infections in the last 6 month(s).  Organ involvement suggested by referring physician or patient is bladder. has a definitive history of a positive culuture with a uropathogen.  Other evaluation includes urinalysis, urine culture, physical exam and history.  Symptoms, when present, include dysuria and urgency and frequency. The patient has been treated with  antibiotics  somewhat effective.     She is also bothered by urge incontinence.  Worse over the last 6 months.  Severity is at times she feels like she empties her bladder.  It does occur with urge.  No hematuria.  Worse after infections.    Lastly during evaluation a CT scan was found to have a small right lower pole calculus.  No flank pain.  His never pass prior stones.      The following portions of the patient's history were reviewed and updated as appropriate: allergies, current medications, past family history, past medical history, past social history, past surgical history and problem list.    Review of Systems   Constitutional: Negative for appetite change, diaphoresis and fever.   HENT: Negative for facial swelling and sore throat.    Eyes: Negative for discharge and visual disturbance.   Respiratory: Negative for cough and shortness of breath.    Cardiovascular: Negative for chest pain and leg swelling.   Gastrointestinal: Negative for anal bleeding and vomiting.   Endocrine: Negative for cold intolerance and heat intolerance.   Genitourinary: Negative for flank pain, hematuria and pelvic pain.   Musculoskeletal: Negative for back pain and gait problem.   Skin:  Negative for pallor and rash.   Allergic/Immunologic: Negative for food allergies and immunocompromised state.   Neurological: Negative for seizures and headaches.   Hematological: Negative for adenopathy. Does not bruise/bleed easily.   Psychiatric/Behavioral: Negative for dysphoric mood, self-injury and suicidal ideas.         Current Outpatient Prescriptions:   •  albuterol (PROVENTIL HFA;VENTOLIN HFA) 108 (90 BASE) MCG/ACT inhaler, Inhale 2 puffs Every 4 (Four) Hours As Needed for wheezing., Disp: 8 inhaler, Rfl: 1  •  Amphetamine-Dextroamphetamine (ADDERALL PO), Take  by mouth., Disp: , Rfl:   •  bumetanide (BUMEX) 1 MG tablet, TAKE ONE TABLET DAILY GENERIC FOR BUMEX, Disp: 30 tablet, Rfl: 1  •  cetirizine (zyrTEC) 10 MG tablet, Take 10 mg by mouth Daily., Disp: , Rfl:   •  fluvoxaMINE (LUVOX) 100 MG tablet, Take 100 mg by mouth Daily., Disp: , Rfl: 0  •  nitrofurantoin (MACRODANTIN) 50 MG capsule, Take 1 capsule by mouth Every Night for 90 days., Disp: 90 capsule, Rfl: 0  •  SYNTHROID 100 MCG tablet, TAKE ONE TABLET DAILY (BRAND NAME PER MD) (NEEDS LABS AND APPOINTMENT ASAP, Disp: 30 tablet, Rfl: 0    Past Medical History:   Diagnosis Date   • Bunion, right    • Cystitis    • Disease of thyroid gland    • Hypercholesteremia    • Plantar fascia syndrome        Past Surgical History:   Procedure Laterality Date   •  SECTION      x2   • CHOLECYSTECTOMY         Social History     Social History   • Marital status:      Spouse name: N/A   • Number of children: N/A   • Years of education: N/A     Social History Main Topics   • Smoking status: Never Smoker   • Smokeless tobacco: Never Used   • Alcohol use Yes      Comment: occisonally   • Drug use: No   • Sexual activity: Defer     Other Topics Concern   • None     Social History Narrative       Family History   Problem Relation Age of Onset   • Diabetes Mother    • Cancer Mother      kidney   • Kidney disease Mother    • Hyperlipidemia Mother    •  "Cancer Father      skin   • Hyperlipidemia Father        /80  Temp 98.6 °F (37 °C)  Ht 63\" (160 cm)  Wt 176 lb (79.8 kg)  BMI 31.18 kg/m2    Physical Exam   Constitutional: She is oriented to person, place, and time. She appears well-developed and well-nourished. No distress.   Neck: Normal range of motion. Neck supple. No tracheal deviation present. No thyromegaly (no mass) present.   Cardiovascular: Intact distal pulses.    No significant edema or varicosities. No tenderness   Pulmonary/Chest: Effort normal. No respiratory distress. She exhibits no tenderness.   No intercostal retractions, use of accessory muscles or asymmetric movements.    Abdominal: Soft. Normal appearance. She exhibits no distension, no ascites and no mass. There is no hepatosplenomegaly. There is no tenderness. There is no rigidity, no rebound, no guarding and no CVA tenderness. No hernia.   Stool specimen is not indicated    Genitourinary: Vagina normal and uterus normal. Pelvic exam was performed with patient supine. There is no rash, tenderness or lesion on the right labia. There is no rash, tenderness or lesion on the left labia. Uterus is not enlarged and not tender. Cervix exhibits no motion tenderness, no discharge and no friability. Right adnexum displays no mass, no tenderness and no fullness. Left adnexum displays no mass, no tenderness and no fullness. No tenderness in the vagina. No vaginal discharge found.   Genitourinary Comments: Normal hair distribution. The urethra is without mass and normal in appearance. The bladder is palpably without mass or tenderness. Vaginal epithelium is without mass or significant atrophy. No significant prolapse. The anus and perineum are normal.    Lymphadenopathy:     She has no cervical adenopathy.        Right: No inguinal adenopathy present.        Left: No inguinal adenopathy present.   Neurological: She is alert and oriented to person, place, and time.   Skin: No ecchymosis and no " rash noted. She is not diaphoretic. No cyanosis or erythema. Nails show no clubbing.   Psychiatric: She has a normal mood and affect. Her behavior is normal.   Vitals reviewed.        Results for orders placed or performed in visit on 07/17/17   POC Urinalysis Dipstick, Automated   Result Value Ref Range    Color Yellow Yellow, Straw, Dark Yellow, Sweta    Clarity, UA Clear Clear    Glucose, UA Negative Negative, 1000 mg/dL (3+) mg/dL    Bilirubin Negative Negative    Ketones, UA Negative Negative    Specific Gravity  1.025 1.005 - 1.030    Blood, UA Large (A) Negative    pH, Urine 6.5 5.0 - 8.0    Protein, POC Negative Negative mg/dL    Urobilinogen, UA Normal Normal    Leukocytes Trace (A) Negative    Nitrite, UA Negative Negative     Independent review of a CT scan of abdomen and pelvis without contrast  The CT scan of the abdomen/pelvis done without contrast is available for me to review.  Treatment recommendations require an independent review.  First I scanned the liver, spleen, and bowel pattern.  The retroperitoneum including the major vessels and lymphatic packages are briefly reviewed.  This film as been reviewed by the radiologist to determine any non urologic abnormalities that are present.  The kidneys are closely inspected for size, symmetry, contour, parenchymal thickness, perinephric reaction, presence of calcifications, and intrarenal dilation of the collecting system.  The ureters are inspected for their course, caliber, and any calcifications.  The bladder is inspected for its thickness, size, and presence of any calcifications.  This scan shows small right lower pole calculus.  No other stones are seen.  No hydronephrosis or obstruction..    Procedure note for in and out catheterization  She is placed in the dorsal lithotomy position.  She is carefully prepped with Betadine around the urethra.  A pediatric catheterization kit is used which contains an approximate 5 [place] catheter.this is  introduced into the urinary bladder.  Approximately 30 mL of urine is obtained and sent to the lab for culture and sensitivity.      Microscopic Urinalysis  I inspected the urine myself based on the clinical situation including the dipstick urine. The urine is spun in a centrifuge for three minutes. The spun urine shows 3-6 rbc/hpf, 0-2 wbc/hpf, 0-2 epi/hpf, negative bacteria, negative crystals, and negative casts.      Bladder Scan interpretation  Estimation of residual urine via abdominal ultrasound  Residual Urine: 94 ml  Indication: incontinence and recurrent uti  Position: Supine  Examination: Incremental scanning of the suprapubic area using 3 MHz transducer using copious amounts of acoustic gel.   Findings: An anechoic area was demonstrated which represented the bladder, with measurement of residual urine as noted. I inspected this myself. In that the residual urine was stable or insignificant, no treatment will be necessary at this time.         Assessment and Plan  Diagnoses and all orders for this visit:    Recurrent UTI  -     POC Urinalysis Dipstick, Automated  -     nitrofurantoin (MACRODANTIN) 50 MG capsule; Take 1 capsule by mouth Every Night for 90 days.    Urge incontinence    Renal calculus, right    #1. The patient understands the recurrent urinary tract infections are often multifactorial in origin.  Discussion of optimum management in setting of no anatomic abnormalities for recurrent UTIs is completed.  Antimicrobial therapies including the risks, convenience, and rationale were explained including postcoital prophylaxis, self start therapy and daily prophylaxis are all explained.  Based upon this discussion we have elected to start daily nitrofurantoin.  I did explain that some bacteria or naturally resistant to this antibiotic but it does kill most uropathogens including Escherichia coli typically.  I explained that the attractive thickness of this antibiotic in this setting is that the  resistance pattern has not changed over decades unlike some other antibiotics such as ciprofloxacin or levofloxacin.  I did explain that if she develops symptoms that are consistent with UTI while taking this antibiotic, it is possible that she will need a different antibiotic and should contact our office to arrange to be seen and have a urine culture done.  This will need to be done by an in and out cath I explained that if we were unavailable at in this setting she should see either her primary care physician or visit the [facility] [place].  The risk of pulmonary fibrosis is explained.  Chronic cough, dyspnea, hemoptysis, or other new pulmonary symptoms should be reported right away.  The patient expresses an understanding.  Will do this for 3  Months.  #2.  I think her urge incontinence is quite bothered by her recurrent urinary tract infections.  Were going to address this initially as described above and use prophylactic antibiotic.  If no improvement we will consider anti-cholinergic or mirror metric therapy.  #3.The patient has been diagnosed with renal urolithiasis. Location is described as  right. The patient's options for therapy are discussed based on the size, location, stone burden, and symptoms.  The decision has been made to follow these stones radiographically without treatment.  The patient understands the risks associated with the conservative approach, but this is a reasonable treatment plan.  The need to contract me for hematuria, fever, recurrent UTI's, flank pain or change in ideology is explained.      Juan Luis Heard MD  07/17/17  9:22 AM      Cc:  Dr. Simms

## 2017-07-19 ENCOUNTER — TELEPHONE (OUTPATIENT)
Dept: UROLOGY | Facility: CLINIC | Age: 42
End: 2017-07-19

## 2017-07-19 LAB — BACTERIA SPEC AEROBE CULT: NORMAL

## 2017-07-19 NOTE — TELEPHONE ENCOUNTER
Informed patient that her urine culture was negative with no infection. Patient voiced understanding. HC

## 2017-07-27 RX ORDER — LEVOTHYROXINE SODIUM 100 MCG
TABLET ORAL
Qty: 30 TABLET | Refills: 2 | Status: SHIPPED | OUTPATIENT
Start: 2017-07-27 | End: 2017-10-21 | Stop reason: SDUPTHER

## 2017-08-15 ENCOUNTER — TELEPHONE (OUTPATIENT)
Dept: FAMILY MEDICINE CLINIC | Facility: CLINIC | Age: 42
End: 2017-08-15

## 2017-08-15 ENCOUNTER — OFFICE VISIT (OUTPATIENT)
Dept: FAMILY MEDICINE CLINIC | Facility: CLINIC | Age: 42
End: 2017-08-15

## 2017-08-15 VITALS
TEMPERATURE: 98.7 F | HEART RATE: 97 BPM | HEIGHT: 63 IN | DIASTOLIC BLOOD PRESSURE: 76 MMHG | BODY MASS INDEX: 30.48 KG/M2 | SYSTOLIC BLOOD PRESSURE: 120 MMHG | OXYGEN SATURATION: 98 % | WEIGHT: 172 LBS

## 2017-08-15 DIAGNOSIS — J03.90 TONSILLITIS: Primary | ICD-10-CM

## 2017-08-15 PROCEDURE — 99213 OFFICE O/P EST LOW 20 MIN: CPT | Performed by: FAMILY MEDICINE

## 2017-08-15 RX ORDER — AMOXICILLIN AND CLAVULANATE POTASSIUM 875; 125 MG/1; MG/1
1 TABLET, FILM COATED ORAL 2 TIMES DAILY
Qty: 20 TABLET | Refills: 0 | Status: SHIPPED | OUTPATIENT
Start: 2017-08-15 | End: 2017-11-01

## 2017-08-15 RX ORDER — METHYLPREDNISOLONE 4 MG/1
TABLET ORAL
Qty: 21 TABLET | Refills: 0 | Status: SHIPPED | OUTPATIENT
Start: 2017-08-15 | End: 2017-11-01

## 2017-08-15 NOTE — TELEPHONE ENCOUNTER
Marjorie called and said that she called in to work today with a sore throat.  She said that it hurts to swallow and she would like to be seen today.  She said that it is her 5th sore throat this year and she wants to be seen and get a referral to ENT.   [phone].

## 2017-08-15 NOTE — PROGRESS NOTES
"Subjective   Marjorie Barnett is a 41 y.o. female.     Chief Complaint   Patient presents with   • Sore Throat     Sore throat        History of Present Illness     Marjorie is noting recurrent episodes of sore throats and tonsillitis--she has had 5 episodes since       Current Outpatient Prescriptions:   •  albuterol (PROVENTIL HFA;VENTOLIN HFA) 108 (90 BASE) MCG/ACT inhaler, Inhale 2 puffs Every 4 (Four) Hours As Needed for wheezing., Disp: 8 inhaler, Rfl: 1  •  Amphetamine-Dextroamphetamine (ADDERALL PO), Take  by mouth., Disp: , Rfl:   •  bumetanide (BUMEX) 1 MG tablet, TAKE ONE TABLET DAILY GENERIC FOR BUMEX, Disp: 30 tablet, Rfl: 1  •  cetirizine (zyrTEC) 10 MG tablet, Take 10 mg by mouth Daily., Disp: , Rfl:   •  fluvoxaMINE (LUVOX) 100 MG tablet, Take 100 mg by mouth Daily., Disp: , Rfl: 0  •  SYNTHROID 100 MCG tablet, TAKE ONE TABLET DAILY (BRAND NAME PER MD), Disp: 30 tablet, Rfl: 2  •  amoxicillin-clavulanate (AUGMENTIN) 875-125 MG per tablet, Take 1 tablet by mouth 2 (Two) Times a Day., Disp: 20 tablet, Rfl: 0  •  MethylPREDNISolone (MEDROL, TANMAY,) 4 MG tablet, Take as directed on package instructions., Disp: 21 tablet, Rfl: 0  Allergies   Allergen Reactions   • Codeine        Past Medical History:   Diagnosis Date   • Bunion, right    • Cystitis    • Disease of thyroid gland    • Hypercholesteremia    • Plantar fascia syndrome      Past Surgical History:   Procedure Laterality Date   •  SECTION      x2   • CHOLECYSTECTOMY         Review of Systems   Constitutional: Positive for chills, fatigue and fever.   HENT: Positive for rhinorrhea and sore throat.    Eyes: Negative.    Respiratory: Negative.    Cardiovascular: Negative.    Gastrointestinal: Negative.    Skin: Negative.    Allergic/Immunologic: Negative.    Hematological: Negative.    Psychiatric/Behavioral: Negative.        Objective  /76  Pulse 97  Temp 98.7 °F (37.1 °C)  Ht 63\" (160 cm)  Wt 172 lb (78 kg)  SpO2 98% "  BMI 30.47 kg/m2  Physical Exam   Constitutional: She is oriented to person, place, and time. She appears well-developed and well-nourished.   HENT:   Head: Normocephalic and atraumatic.   Right Ear: External ear normal.   Left Ear: External ear normal.   Nose: Nose normal.   tonsills +4 in size with erythema/exudate   Eyes: Conjunctivae and EOM are normal. Pupils are equal, round, and reactive to light.   Neck: Normal range of motion. Neck supple.   Cardiovascular: Normal rate, regular rhythm, normal heart sounds and intact distal pulses.    Pulmonary/Chest: Effort normal and breath sounds normal.   Abdominal: Soft. Bowel sounds are normal.   Musculoskeletal: Normal range of motion.   Neurological: She is alert and oriented to person, place, and time. She has normal reflexes.   Skin: Skin is warm and dry.   Psychiatric: She has a normal mood and affect. Her behavior is normal. Judgment and thought content normal.   Nursing note and vitals reviewed.      Assessment/Plan   Marjorie was seen today for sore throat.    Diagnoses and all orders for this visit:    Tonsillitis  -     Ambulatory Referral to ENT (Otolaryngology)    Other orders  -     amoxicillin-clavulanate (AUGMENTIN) 875-125 MG per tablet; Take 1 tablet by mouth 2 (Two) Times a Day.  -     MethylPREDNISolone (MEDROL, TANMAY,) 4 MG tablet; Take as directed on package instructions.      This is the 5th pharyngitis/tonsillitis since feb--she desires ent consultation           Orders Placed This Encounter   Procedures   • Ambulatory Referral to ENT (Otolaryngology)     Referral Priority:   Routine     Referral Type:   Consultation     Referral Reason:   Specialty Services Required     Referred to Provider:   Allen San MD     Requested Specialty:   Otolaryngology     Number of Visits Requested:   1       Follow up: prn

## 2017-10-24 RX ORDER — LEVOTHYROXINE SODIUM 100 MCG
TABLET ORAL
Qty: 30 TABLET | Refills: 5 | Status: SHIPPED | OUTPATIENT
Start: 2017-10-24 | End: 2018-05-09 | Stop reason: SDUPTHER

## 2017-11-01 ENCOUNTER — OFFICE VISIT (OUTPATIENT)
Dept: FAMILY MEDICINE CLINIC | Facility: CLINIC | Age: 42
End: 2017-11-01

## 2017-11-01 ENCOUNTER — TELEPHONE (OUTPATIENT)
Dept: FAMILY MEDICINE CLINIC | Facility: CLINIC | Age: 42
End: 2017-11-01

## 2017-11-01 VITALS
DIASTOLIC BLOOD PRESSURE: 80 MMHG | BODY MASS INDEX: 29.23 KG/M2 | SYSTOLIC BLOOD PRESSURE: 122 MMHG | OXYGEN SATURATION: 97 % | HEART RATE: 82 BPM | RESPIRATION RATE: 16 BRPM | HEIGHT: 63 IN | WEIGHT: 165 LBS

## 2017-11-01 DIAGNOSIS — L01.00 IMPETIGO: Primary | ICD-10-CM

## 2017-11-01 PROCEDURE — 99213 OFFICE O/P EST LOW 20 MIN: CPT | Performed by: FAMILY MEDICINE

## 2017-11-01 RX ORDER — CLINDAMYCIN HYDROCHLORIDE 150 MG/1
150 CAPSULE ORAL 3 TIMES DAILY
Qty: 21 CAPSULE | Refills: 0 | Status: SHIPPED | OUTPATIENT
Start: 2017-11-01 | End: 2018-04-02

## 2017-11-01 RX ORDER — MUPIROCIN CALCIUM 20 MG/G
CREAM TOPICAL 3 TIMES DAILY
Qty: 15 G | Refills: 0 | Status: SHIPPED | OUTPATIENT
Start: 2017-11-01 | End: 2018-04-02

## 2017-11-01 RX ORDER — DEXTROAMPHETAMINE SULFATE, DEXTROAMPHETAMINE SACCHARATE, AMPHETAMINE SULFATE AND AMPHETAMINE ASPARTATE 7.5; 7.5; 7.5; 7.5 MG/1; MG/1; MG/1; MG/1
CAPSULE, EXTENDED RELEASE ORAL
Refills: 0 | COMMUNITY
Start: 2017-10-02 | End: 2018-04-02 | Stop reason: DRUGHIGH

## 2017-11-01 NOTE — PROGRESS NOTES
"Subjective   Marjorie Barnett is a 42 y.o. female.     Chief Complaint   Patient presents with   • Rash     chin and chest     possible staph   pt states son has had staph       History of Present Illness     noted pustuar eruptiosn over chin--her son has it too      Current Outpatient Prescriptions:   •  albuterol (PROVENTIL HFA;VENTOLIN HFA) 108 (90 BASE) MCG/ACT inhaler, Inhale 2 puffs Every 4 (Four) Hours As Needed for wheezing., Disp: 8 inhaler, Rfl: 1  •  Amphetamine-Dextroamphetamine (ADDERALL PO), Take  by mouth., Disp: , Rfl:   •  cetirizine (zyrTEC) 10 MG tablet, Take 10 mg by mouth Daily., Disp: , Rfl:   •  fluvoxaMINE (LUVOX) 100 MG tablet, Take 100 mg by mouth Daily., Disp: , Rfl: 0  •  SYNTHROID 100 MCG tablet, TAKE ONE TABLET DAILY (BRAND NAME PER MD), Disp: 30 tablet, Rfl: 5  •  ADDERALL XR 30 MG 24 hr capsule, TK 1 C PO ONCE D IN THE MORNING, Disp: , Rfl: 0  •  clindamycin (CLEOCIN) 150 MG capsule, Take 1 capsule by mouth 3 (Three) Times a Day., Disp: 21 capsule, Rfl: 0  •  mupirocin (BACTROBAN) 2 % cream, Apply  topically 3 (Three) Times a Day., Disp: 15 g, Rfl: 0  Allergies   Allergen Reactions   • Codeine        Past Medical History:   Diagnosis Date   • Bunion, right    • Cystitis    • Disease of thyroid gland    • Hypercholesteremia    • Plantar fascia syndrome      Past Surgical History:   Procedure Laterality Date   •  SECTION      x2   • CHOLECYSTECTOMY         Review of Systems   Constitutional: Negative.    HENT: Negative.    Skin: Positive for rash.       Objective  /80  Pulse 82  Resp 16  Ht 63\" (160 cm)  Wt 165 lb (74.8 kg)  SpO2 97%  BMI 29.23 kg/m2  Physical Exam   Constitutional: She appears well-nourished.   Skin: Rash (noted pustular eruption over chin) noted.   Nursing note and vitals reviewed.      Assessment/Plan   Marjorie was seen today for rash.    Diagnoses and all orders for this visit:    Impetigo    Other orders  -     clindamycin (CLEOCIN) 150 MG " capsule; Take 1 capsule by mouth 3 (Three) Times a Day.  -     mupirocin (BACTROBAN) 2 % cream; Apply  topically 3 (Three) Times a Day.      Keep me informed           No orders of the defined types were placed in this encounter.      Follow up: prn

## 2017-11-01 NOTE — TELEPHONE ENCOUNTER
Pt called she has a spot on her chest and face that looks to be staph she had a pa at [facility] look at area last night and pt has sore throar and feels bad will u see her today if not she will go walk in [phone]

## 2018-03-09 ENCOUNTER — TELEPHONE (OUTPATIENT)
Dept: FAMILY MEDICINE CLINIC | Facility: CLINIC | Age: 43
End: 2018-03-09

## 2018-03-09 DIAGNOSIS — I10 HYPERTENSION, UNSPECIFIED TYPE: ICD-10-CM

## 2018-03-09 DIAGNOSIS — E03.9 HYPOTHYROIDISM, UNSPECIFIED TYPE: Primary | ICD-10-CM

## 2018-03-09 RX ORDER — BUMETANIDE 1 MG/1
1 TABLET ORAL DAILY
Qty: 30 TABLET | Refills: 0 | Status: SHIPPED | OUTPATIENT
Start: 2018-03-09 | End: 2018-04-09 | Stop reason: SDUPTHER

## 2018-03-09 RX ORDER — LOSARTAN POTASSIUM 50 MG/1
50 TABLET ORAL DAILY
Qty: 30 TABLET | Refills: 0 | Status: SHIPPED | OUTPATIENT
Start: 2018-03-09 | End: 2018-04-09 | Stop reason: SDUPTHER

## 2018-03-09 NOTE — TELEPHONE ENCOUNTER
I have done lab orders and faxed also I can not reach pt so I looked in old chart and last bp pill and water pill was bumex and losartan so I sent 30 days to mayur andleft this all for the pt on her vm and to call Monday for a follow up appt

## 2018-03-09 NOTE — TELEPHONE ENCOUNTER
Pt called she wants lab orders sent to [facility] she will get done Monday,cbc cmp lipid tsh?also her bp has been elevated they have been checking it at work she wants to kow if uwill start her back on her bp med and water pil? [phone]

## 2018-03-13 ENCOUNTER — APPOINTMENT (OUTPATIENT)
Dept: LAB | Facility: HOSPITAL | Age: 43
End: 2018-03-13
Attending: FAMILY MEDICINE

## 2018-03-13 LAB
ALBUMIN SERPL-MCNC: 4.6 G/DL (ref 3.5–5)
ALBUMIN/GLOB SERPL: 1.5 G/DL (ref 1.1–2.5)
ALP SERPL-CCNC: 85 U/L (ref 24–120)
ALT SERPL W P-5'-P-CCNC: 37 U/L (ref 0–54)
ANION GAP SERPL CALCULATED.3IONS-SCNC: 11 MMOL/L (ref 4–13)
ARTICHOKE IGE QN: 112 MG/DL (ref 0–99)
AST SERPL-CCNC: 43 U/L (ref 7–45)
BASOPHILS # BLD AUTO: 0.07 10*3/MM3 (ref 0–0.2)
BASOPHILS NFR BLD AUTO: 1 % (ref 0–2)
BILIRUB SERPL-MCNC: 0.5 MG/DL (ref 0.1–1)
BUN BLD-MCNC: 16 MG/DL (ref 5–21)
BUN/CREAT SERPL: 21.3 (ref 7–25)
CALCIUM SPEC-SCNC: 9.1 MG/DL (ref 8.4–10.4)
CHLORIDE SERPL-SCNC: 100 MMOL/L (ref 98–110)
CHOLEST SERPL-MCNC: 198 MG/DL (ref 130–200)
CO2 SERPL-SCNC: 30 MMOL/L (ref 24–31)
CREAT BLD-MCNC: 0.75 MG/DL (ref 0.5–1.4)
DEPRECATED RDW RBC AUTO: 42.3 FL (ref 40–54)
EOSINOPHIL # BLD AUTO: 0.2 10*3/MM3 (ref 0–0.7)
EOSINOPHIL NFR BLD AUTO: 3 % (ref 0–4)
ERYTHROCYTE [DISTWIDTH] IN BLOOD BY AUTOMATED COUNT: 14.2 % (ref 12–15)
GFR SERPL CREATININE-BSD FRML MDRD: 86 ML/MIN/1.73
GLOBULIN UR ELPH-MCNC: 3.1 GM/DL
GLUCOSE BLD-MCNC: 97 MG/DL (ref 70–100)
HCT VFR BLD AUTO: 40.5 % (ref 37–47)
HDLC SERPL-MCNC: 53 MG/DL
HGB BLD-MCNC: 13 G/DL (ref 12–16)
IMM GRANULOCYTES # BLD: 0.03 10*3/MM3 (ref 0–0.03)
IMM GRANULOCYTES NFR BLD: 0.4 % (ref 0–5)
LDLC/HDLC SERPL: 2.29 {RATIO}
LYMPHOCYTES # BLD AUTO: 1.56 10*3/MM3 (ref 0.72–4.86)
LYMPHOCYTES NFR BLD AUTO: 23.3 % (ref 15–45)
MCH RBC QN AUTO: 26.4 PG (ref 28–32)
MCHC RBC AUTO-ENTMCNC: 32.1 G/DL (ref 33–36)
MCV RBC AUTO: 82.3 FL (ref 82–98)
MONOCYTES # BLD AUTO: 0.45 10*3/MM3 (ref 0.19–1.3)
MONOCYTES NFR BLD AUTO: 6.7 % (ref 4–12)
NEUTROPHILS # BLD AUTO: 4.38 10*3/MM3 (ref 1.87–8.4)
NEUTROPHILS NFR BLD AUTO: 65.6 % (ref 39–78)
NRBC BLD MANUAL-RTO: 0 /100 WBC (ref 0–0)
PLATELET # BLD AUTO: 348 10*3/MM3 (ref 130–400)
PMV BLD AUTO: 9.5 FL (ref 6–12)
POTASSIUM BLD-SCNC: 3.8 MMOL/L (ref 3.5–5.3)
PROT SERPL-MCNC: 7.7 G/DL (ref 6.3–8.7)
RBC # BLD AUTO: 4.92 10*6/MM3 (ref 4.2–5.4)
SODIUM BLD-SCNC: 141 MMOL/L (ref 135–145)
TRIGL SERPL-MCNC: 118 MG/DL (ref 0–149)
TSH SERPL DL<=0.05 MIU/L-ACNC: 2.03 MIU/ML (ref 0.47–4.68)
WBC NRBC COR # BLD: 6.69 10*3/MM3 (ref 4.8–10.8)

## 2018-03-13 PROCEDURE — 80053 COMPREHEN METABOLIC PANEL: CPT | Performed by: FAMILY MEDICINE

## 2018-03-13 PROCEDURE — 84443 ASSAY THYROID STIM HORMONE: CPT | Performed by: FAMILY MEDICINE

## 2018-03-13 PROCEDURE — 80061 LIPID PANEL: CPT | Performed by: FAMILY MEDICINE

## 2018-03-13 PROCEDURE — 85025 COMPLETE CBC W/AUTO DIFF WBC: CPT | Performed by: FAMILY MEDICINE

## 2018-03-14 ENCOUNTER — RESULTS ENCOUNTER (OUTPATIENT)
Dept: FAMILY MEDICINE CLINIC | Facility: CLINIC | Age: 43
End: 2018-03-14

## 2018-03-14 DIAGNOSIS — I10 HYPERTENSION, UNSPECIFIED TYPE: ICD-10-CM

## 2018-03-14 DIAGNOSIS — E03.9 HYPOTHYROIDISM, UNSPECIFIED TYPE: ICD-10-CM

## 2018-04-02 ENCOUNTER — OFFICE VISIT (OUTPATIENT)
Dept: FAMILY MEDICINE CLINIC | Facility: CLINIC | Age: 43
End: 2018-04-02

## 2018-04-02 VITALS
DIASTOLIC BLOOD PRESSURE: 82 MMHG | BODY MASS INDEX: 28.68 KG/M2 | OXYGEN SATURATION: 97 % | WEIGHT: 168 LBS | HEIGHT: 64 IN | SYSTOLIC BLOOD PRESSURE: 122 MMHG | RESPIRATION RATE: 16 BRPM | HEART RATE: 84 BPM

## 2018-04-02 DIAGNOSIS — F33.9 RECURRENT MAJOR DEPRESSIVE DISORDER, REMISSION STATUS UNSPECIFIED (HCC): ICD-10-CM

## 2018-04-02 DIAGNOSIS — E03.9 HYPOTHYROIDISM, UNSPECIFIED TYPE: ICD-10-CM

## 2018-04-02 DIAGNOSIS — I10 ESSENTIAL HYPERTENSION: Primary | ICD-10-CM

## 2018-04-02 PROCEDURE — 99213 OFFICE O/P EST LOW 20 MIN: CPT | Performed by: FAMILY MEDICINE

## 2018-04-02 RX ORDER — ALPRAZOLAM 0.5 MG/1
TABLET ORAL
Refills: 1 | COMMUNITY
Start: 2018-03-14 | End: 2019-11-07 | Stop reason: SDUPTHER

## 2018-04-02 RX ORDER — SERTRALINE HYDROCHLORIDE 100 MG/1
100 TABLET, FILM COATED ORAL DAILY
Refills: 3 | COMMUNITY
Start: 2018-03-14 | End: 2019-11-07 | Stop reason: SDUPTHER

## 2018-04-02 RX ORDER — DEXTROAMPHETAMINE SULFATE, DEXTROAMPHETAMINE SACCHARATE, AMPHETAMINE SULFATE AND AMPHETAMINE ASPARTATE 5; 5; 5; 5 MG/1; MG/1; MG/1; MG/1
CAPSULE, EXTENDED RELEASE ORAL
Refills: 0 | COMMUNITY
Start: 2018-03-20 | End: 2020-03-10

## 2018-04-02 NOTE — PROGRESS NOTES
"Subjective   Marjorie Barnett is a 42 y.o. female.     Chief Complaint   Patient presents with   • Follow-up     Lab Results        History of Present Illness     she is feeling good--energy level is good--bp is controlled.denies any cp or ha..she is tolerating synthroid without chest pain or ha      Current Outpatient Prescriptions:   •  ADDERALL XR 20 MG 24 hr capsule, Take 2 capsules twice a day, Disp: , Rfl: 0  •  albuterol (PROVENTIL HFA;VENTOLIN HFA) 108 (90 BASE) MCG/ACT inhaler, Inhale 2 puffs Every 4 (Four) Hours As Needed for wheezing., Disp: 8 inhaler, Rfl: 1  •  ALPRAZolam (XANAX) 0.5 MG tablet, TK 1 T PO TID PRN, Disp: , Rfl: 1  •  bumetanide (BUMEX) 1 MG tablet, Take 1 tablet by mouth Daily., Disp: 30 tablet, Rfl: 0  •  cetirizine (zyrTEC) 10 MG tablet, Take 10 mg by mouth Daily., Disp: , Rfl:   •  losartan (COZAAR) 50 MG tablet, Take 1 tablet by mouth Daily., Disp: 30 tablet, Rfl: 0  •  sertraline (ZOLOFT) 100 MG tablet, Take 100 mg by mouth Daily., Disp: , Rfl: 3  •  SYNTHROID 100 MCG tablet, TAKE ONE TABLET DAILY (BRAND NAME PER MD), Disp: 30 tablet, Rfl: 5  Allergies   Allergen Reactions   • Codeine Itching and GI Intolerance     Chest pain       Past Medical History:   Diagnosis Date   • Bunion, right    • Cystitis    • Disease of thyroid gland    • Hypercholesteremia    • Plantar fascia syndrome      Past Surgical History:   Procedure Laterality Date   •  SECTION      x2   • CHOLECYSTECTOMY         Review of Systems   Constitutional: Negative.    HENT: Negative.    Eyes: Negative.    Respiratory: Negative.    Cardiovascular: Negative.    Gastrointestinal: Negative.    Endocrine: Negative.    Genitourinary: Negative.    Musculoskeletal: Negative.    Skin: Negative.    Allergic/Immunologic: Negative.    Neurological: Negative.    Hematological: Negative.    Psychiatric/Behavioral: Negative.        Objective  /82   Pulse 84   Resp 16   Ht 162.6 cm (64\")   Wt 76.2 kg (168 lb)   SpO2 " 97%   BMI 28.84 kg/m²   Physical Exam   Constitutional: She is oriented to person, place, and time. She appears well-developed and well-nourished.   HENT:   Head: Normocephalic.   Eyes: Conjunctivae and EOM are normal. Pupils are equal, round, and reactive to light.   Neck: Normal range of motion. Neck supple.   Cardiovascular: Normal rate, regular rhythm, normal heart sounds and intact distal pulses.    Pulmonary/Chest: Effort normal and breath sounds normal.   Abdominal: Soft. Bowel sounds are normal.   Musculoskeletal: Normal range of motion.   Neurological: She is alert and oriented to person, place, and time. She has normal reflexes.   Skin: Capillary refill takes less than 2 seconds.   Psychiatric: She has a normal mood and affect. Her behavior is normal. Judgment and thought content normal.   Nursing note and vitals reviewed.      Assessment/Plan   Marjorie was seen today for follow-up.    Diagnoses and all orders for this visit:    Essential hypertension    Hypothyroidism, unspecified type    Recurrent major depressive disorder, remission status unspecified        We reviwed labs today         No orders of the defined types were placed in this encounter.      Follow up: 6 month(s)

## 2018-04-10 RX ORDER — BUMETANIDE 1 MG/1
TABLET ORAL
Qty: 30 TABLET | Refills: 2 | Status: SHIPPED | OUTPATIENT
Start: 2018-04-10 | End: 2019-11-07

## 2018-04-10 RX ORDER — LOSARTAN POTASSIUM 50 MG/1
TABLET ORAL
Qty: 30 TABLET | Refills: 2 | Status: SHIPPED | OUTPATIENT
Start: 2018-04-10 | End: 2018-07-18

## 2018-05-09 RX ORDER — LEVOTHYROXINE SODIUM 100 MCG
TABLET ORAL
Qty: 30 TABLET | Refills: 2 | Status: SHIPPED | OUTPATIENT
Start: 2018-05-09 | End: 2019-10-03 | Stop reason: SDUPTHER

## 2018-07-18 ENCOUNTER — OFFICE VISIT (OUTPATIENT)
Dept: FAMILY MEDICINE CLINIC | Facility: CLINIC | Age: 43
End: 2018-07-18

## 2018-07-18 VITALS
HEART RATE: 72 BPM | SYSTOLIC BLOOD PRESSURE: 118 MMHG | DIASTOLIC BLOOD PRESSURE: 84 MMHG | RESPIRATION RATE: 16 BRPM | WEIGHT: 160 LBS | HEIGHT: 64 IN | OXYGEN SATURATION: 97 % | BODY MASS INDEX: 27.31 KG/M2

## 2018-07-18 DIAGNOSIS — S80.859A: Primary | ICD-10-CM

## 2018-07-18 PROCEDURE — 99213 OFFICE O/P EST LOW 20 MIN: CPT | Performed by: FAMILY MEDICINE

## 2018-07-18 NOTE — PROGRESS NOTES
Subjective   Marjorie Barnett is a 42 y.o. female.     Chief Complaint   Patient presents with   • Foreign Body in Skin     pt states that when she was in elementary school she fell and got pencil lead in her right leg just below her knee and she just wants to make sure that she doesnt need to have it removed.  she denies any pain         History of Present Illness     noted foreigh body under the knee-no pain or infected      Current Outpatient Prescriptions:   •  ADDERALL XR 20 MG 24 hr capsule, Take 2 capsules twice a day, Disp: , Rfl: 0  •  albuterol (PROVENTIL HFA;VENTOLIN HFA) 108 (90 BASE) MCG/ACT inhaler, Inhale 2 puffs Every 4 (Four) Hours As Needed for wheezing., Disp: 8 inhaler, Rfl: 1  •  ALPRAZolam (XANAX) 0.5 MG tablet, TK 1 T PO TID PRN, Disp: , Rfl: 1  •  bumetanide (BUMEX) 1 MG tablet, TAKE ONE TABLET DAILY GENERIC FOR BUMEX, Disp: 30 tablet, Rfl: 2  •  cetirizine (zyrTEC) 10 MG tablet, Take 10 mg by mouth Daily., Disp: , Rfl:   •  sertraline (ZOLOFT) 100 MG tablet, Take 100 mg by mouth Daily., Disp: , Rfl: 3  •  SYNTHROID 100 MCG tablet, TAKE ONE TABLET DAILY (BRAND NAME PER MD), Disp: 30 tablet, Rfl: 2  Allergies   Allergen Reactions   • Codeine Itching and GI Intolerance     Chest pain       Past Medical History:   Diagnosis Date   • Bunion, right    • Cystitis    • Disease of thyroid gland    • Hypercholesteremia    • Plantar fascia syndrome      Past Surgical History:   Procedure Laterality Date   •  SECTION      x2   • CHOLECYSTECTOMY         Review of Systems   Constitutional: Negative.    HENT: Negative.    Eyes: Negative.    Respiratory: Negative.    Cardiovascular: Negative.    Gastrointestinal: Negative.    Endocrine: Negative.    Genitourinary: Negative.    Musculoskeletal: Negative.    Skin: Negative.    Allergic/Immunologic: Negative.    Neurological: Negative.    Hematological: Negative.    Psychiatric/Behavioral: Negative.        Objective  /84   Pulse 72   Resp 16    "Ht 162.6 cm (64\")   Wt 72.6 kg (160 lb)   SpO2 97%   BMI 27.46 kg/m²   Physical Exam   Constitutional: She is oriented to person, place, and time. She appears well-developed and well-nourished.   HENT:   Head: Normocephalic and atraumatic.   Right Ear: External ear normal.   Left Ear: External ear normal.   Nose: Nose normal.   Mouth/Throat: Oropharynx is clear and moist.   Eyes: Pupils are equal, round, and reactive to light. Conjunctivae and EOM are normal.   Neck: Normal range of motion. Neck supple.   Cardiovascular: Normal rate, regular rhythm, normal heart sounds and intact distal pulses.    Pulmonary/Chest: Breath sounds normal.   Abdominal: Soft. Bowel sounds are normal.   Musculoskeletal: Normal range of motion.   Neurological: She is alert and oriented to person, place, and time.   Skin: Skin is warm. Capillary refill takes less than 2 seconds.   Psychiatric: She has a normal mood and affect. Her behavior is normal. Judgment and thought content normal.   Vitals reviewed.      Assessment/Plan   Marjorie was seen today for foreign body in skin.    Diagnoses and all orders for this visit:    Foreign body in lower extremity, unspecified laterality, initial encounter        She declinesd surgical referral         No orders of the defined types were placed in this encounter.      Follow up: prn  "

## 2018-10-10 ENCOUNTER — TRANSCRIBE ORDERS (OUTPATIENT)
Dept: ADMINISTRATIVE | Facility: HOSPITAL | Age: 43
End: 2018-10-10

## 2018-10-10 DIAGNOSIS — Z12.39 SCREENING BREAST EXAMINATION: Primary | ICD-10-CM

## 2018-10-12 ENCOUNTER — HOSPITAL ENCOUNTER (OUTPATIENT)
Dept: MAMMOGRAPHY | Facility: HOSPITAL | Age: 43
Discharge: HOME | End: 2018-10-12
Attending: OBSTETRICS & GYNECOLOGY | Admitting: OBSTETRICS & GYNECOLOGY

## 2018-10-12 DIAGNOSIS — Z12.39 SCREENING BREAST EXAMINATION: ICD-10-CM

## 2018-10-12 PROCEDURE — 77063 BREAST TOMOSYNTHESIS BI: CPT

## 2018-10-12 PROCEDURE — 77067 SCR MAMMO BI INCL CAD: CPT

## 2018-10-16 ENCOUNTER — TELEPHONE (OUTPATIENT)
Dept: FAMILY MEDICINE CLINIC | Facility: CLINIC | Age: 43
End: 2018-10-16

## 2018-10-16 RX ORDER — AZITHROMYCIN 250 MG/1
TABLET, FILM COATED ORAL
Qty: 6 TABLET | Refills: 0 | Status: SHIPPED | OUTPATIENT
Start: 2018-10-16 | End: 2019-11-07

## 2018-10-16 RX ORDER — METHYLPREDNISOLONE 4 MG/1
TABLET ORAL
Qty: 21 TABLET | Refills: 0 | Status: SHIPPED | OUTPATIENT
Start: 2018-10-16 | End: 2019-11-07

## 2018-11-02 RX ORDER — DEXTROMETHORPHAN HYDROBROMIDE AND PROMETHAZINE HYDROCHLORIDE 15; 6.25 MG/5ML; MG/5ML
2.5 SYRUP ORAL 4 TIMES DAILY PRN
Qty: 240 ML | Refills: 1 | Status: SHIPPED | OUTPATIENT
Start: 2018-11-02 | End: 2019-11-07

## 2019-03-01 RX ORDER — ALPRAZOLAM 0.5 MG/1
TABLET ORAL
Qty: 90 TABLET | Refills: 2 | Status: SHIPPED | OUTPATIENT
Start: 2019-03-01 | End: 2019-11-07 | Stop reason: SDUPTHER

## 2019-03-01 NOTE — TELEPHONE ENCOUNTER
Patient would like to know if you would refill her Xanax.  She normally sees Dr. Abdul but has been able to get in with him. [facility].  Her number is [phone]

## 2019-03-19 ENCOUNTER — TELEPHONE (OUTPATIENT)
Dept: FAMILY MEDICINE CLINIC | Facility: CLINIC | Age: 44
End: 2019-03-19

## 2019-03-19 RX ORDER — SULFAMETHOXAZOLE AND TRIMETHOPRIM 800; 160 MG/1; MG/1
1 TABLET ORAL 2 TIMES DAILY
Qty: 14 TABLET | Refills: 0 | Status: SHIPPED | OUTPATIENT
Start: 2019-03-19 | End: 2019-03-26

## 2019-03-19 NOTE — TELEPHONE ENCOUNTER
Pt called he has a bladder in lower back pain burns when urinates she asked if u will please call in anbtc md2

## 2019-04-11 ENCOUNTER — TELEPHONE (OUTPATIENT)
Dept: FAMILY MEDICINE CLINIC | Facility: CLINIC | Age: 44
End: 2019-04-11

## 2019-04-11 DIAGNOSIS — R53.83 FATIGUE, UNSPECIFIED TYPE: Primary | ICD-10-CM

## 2019-04-11 NOTE — TELEPHONE ENCOUNTER
Pt called she wants to know if u will do a order to get her thyroid checked she stated she has been tired no energy and not felt good in a while and she thinks it could be thyroid  She wants at  will u do the order and I leonor.l call her [phone]

## 2019-04-15 ENCOUNTER — LAB (OUTPATIENT)
Dept: LAB | Facility: HOSPITAL | Age: 44
End: 2019-04-15

## 2019-04-15 DIAGNOSIS — R53.83 FATIGUE, UNSPECIFIED TYPE: ICD-10-CM

## 2019-04-15 LAB
BASOPHILS # BLD AUTO: 0.08 10*3/MM3 (ref 0–0.2)
BASOPHILS NFR BLD AUTO: 0.9 % (ref 0–2)
DEPRECATED RDW RBC AUTO: 42.5 FL (ref 40–54)
EOSINOPHIL # BLD AUTO: 0.26 10*3/MM3 (ref 0–0.7)
EOSINOPHIL NFR BLD AUTO: 3.1 % (ref 0–4)
ERYTHROCYTE [DISTWIDTH] IN BLOOD BY AUTOMATED COUNT: 13.8 % (ref 12–15)
FERRITIN SERPL-MCNC: 9.83 NG/ML (ref 6.24–137)
HCT VFR BLD AUTO: 35.9 % (ref 37–47)
HGB BLD-MCNC: 11.7 G/DL (ref 12–16)
IMM GRANULOCYTES # BLD AUTO: 0.04 10*3/MM3 (ref 0–0.05)
IMM GRANULOCYTES NFR BLD AUTO: 0.5 % (ref 0–5)
IRON 24H UR-MRATE: 65 MCG/DL (ref 42–180)
LYMPHOCYTES # BLD AUTO: 2.29 10*3/MM3 (ref 0.72–4.86)
LYMPHOCYTES NFR BLD AUTO: 27.1 % (ref 15–45)
MCH RBC QN AUTO: 27.3 PG (ref 28–32)
MCHC RBC AUTO-ENTMCNC: 32.6 G/DL (ref 33–36)
MCV RBC AUTO: 83.9 FL (ref 82–98)
MONOCYTES # BLD AUTO: 0.54 10*3/MM3 (ref 0.19–1.3)
MONOCYTES NFR BLD AUTO: 6.4 % (ref 4–12)
NEUTROPHILS # BLD AUTO: 5.25 10*3/MM3 (ref 1.87–8.4)
NEUTROPHILS NFR BLD AUTO: 62 % (ref 39–78)
NRBC BLD AUTO-RTO: 0 /100 WBC (ref 0–0)
PLATELET # BLD AUTO: 336 10*3/MM3 (ref 130–400)
PMV BLD AUTO: 9.4 FL (ref 6–12)
RBC # BLD AUTO: 4.28 10*6/MM3 (ref 4.2–5.4)
T4 FREE SERPL-MCNC: 1.1 NG/DL (ref 0.78–2.19)
TSH SERPL DL<=0.05 MIU/L-ACNC: 2.9 MIU/ML (ref 0.47–4.68)
VIT B12 BLD-MCNC: 645 PG/ML (ref 239–931)
WBC NRBC COR # BLD: 8.46 10*3/MM3 (ref 4.8–10.8)

## 2019-04-15 PROCEDURE — 85025 COMPLETE CBC W/AUTO DIFF WBC: CPT | Performed by: FAMILY MEDICINE

## 2019-04-15 PROCEDURE — 82728 ASSAY OF FERRITIN: CPT | Performed by: FAMILY MEDICINE

## 2019-04-15 PROCEDURE — 84443 ASSAY THYROID STIM HORMONE: CPT | Performed by: FAMILY MEDICINE

## 2019-04-15 PROCEDURE — 82607 VITAMIN B-12: CPT | Performed by: FAMILY MEDICINE

## 2019-04-15 PROCEDURE — 36415 COLL VENOUS BLD VENIPUNCTURE: CPT

## 2019-04-15 PROCEDURE — 83540 ASSAY OF IRON: CPT | Performed by: FAMILY MEDICINE

## 2019-04-15 PROCEDURE — 84439 ASSAY OF FREE THYROXINE: CPT | Performed by: FAMILY MEDICINE

## 2019-04-15 NOTE — PROGRESS NOTES
Pt informed she is anemic, but the iron and thyroid is ok. She says she does not have periods with the IUD.Wants to know should she do something different

## 2019-05-02 ENCOUNTER — TELEPHONE (OUTPATIENT)
Dept: FAMILY MEDICINE CLINIC | Facility: CLINIC | Age: 44
End: 2019-05-02

## 2019-05-02 DIAGNOSIS — R30.0 DYSURIA: Primary | ICD-10-CM

## 2019-05-02 RX ORDER — SULFAMETHOXAZOLE AND TRIMETHOPRIM 800; 160 MG/1; MG/1
1 TABLET ORAL 2 TIMES DAILY
Qty: 14 TABLET | Refills: 0 | Status: SHIPPED | OUTPATIENT
Start: 2019-05-02 | End: 2019-05-09

## 2019-05-02 NOTE — TELEPHONE ENCOUNTER
Please put in for 2pm for lab for ua and the orders are all in the computer and pt knows to get her meds after the urine and call next week for results and referral to urology

## 2019-05-02 NOTE — TELEPHONE ENCOUNTER
Her urologist will want a ua and culture done----can she get that done at [facility]?--if not allergic to sulfa---bactrim ds bid x 7days and call next week for culture results and urology referral

## 2019-05-05 LAB
APPEARANCE UR: CLEAR
BACTERIA #/AREA URNS HPF: ABNORMAL /HPF
BACTERIA UR CULT: ABNORMAL
BACTERIA UR CULT: ABNORMAL
BILIRUB UR QL STRIP: NEGATIVE
CASTS URNS MICRO: ABNORMAL
COLOR UR: YELLOW
EPI CELLS #/AREA URNS HPF: ABNORMAL /HPF
GLUCOSE UR QL: NEGATIVE
HGB UR QL STRIP: (no result)
KETONES UR QL STRIP: NEGATIVE
LEUKOCYTE ESTERASE UR QL STRIP: (no result)
NITRITE UR QL STRIP: NEGATIVE
PH UR STRIP: 7.5 [PH] (ref 5–8)
PROT UR QL STRIP: (no result)
RBC #/AREA URNS HPF: ABNORMAL /HPF
SP GR UR: 1.01 (ref 1–1.03)
UROBILINOGEN UR STRIP-MCNC: (no result) MG/DL
WBC #/AREA URNS HPF: ABNORMAL /HPF

## 2019-05-28 ENCOUNTER — TELEPHONE (OUTPATIENT)
Dept: FAMILY MEDICINE CLINIC | Facility: CLINIC | Age: 44
End: 2019-05-28

## 2019-05-28 DIAGNOSIS — R32 URINARY INCONTINENCE, UNSPECIFIED TYPE: Primary | ICD-10-CM

## 2019-05-28 RX ORDER — NITROFURANTOIN MACROCRYSTALS 50 MG/1
50 CAPSULE ORAL DAILY
Qty: 10 CAPSULE | Refills: 0 | Status: SHIPPED | OUTPATIENT
Start: 2019-05-28 | End: 2019-06-08

## 2019-05-28 NOTE — TELEPHONE ENCOUNTER
Pt called asking about her referral to see dr andres?pt was left the message earlier this month to call the office bck with results but pt did not call back but she now has anohter uti and still the incontence issues she asked for anbtc and then the referral? [phone]

## 2019-07-15 ENCOUNTER — TELEPHONE (OUTPATIENT)
Dept: FAMILY MEDICINE CLINIC | Facility: CLINIC | Age: 44
End: 2019-07-15

## 2019-07-15 DIAGNOSIS — R53.83 FATIGUE, UNSPECIFIED TYPE: Primary | ICD-10-CM

## 2019-07-15 NOTE — TELEPHONE ENCOUNTER
Marjorie believes her iron may below.  She says that she has SOB, fatigue, and some chest tightness x 2 weeks.  She said that this is how she felt last time her iron was low. She would like to have labs drawn at .  [phone]

## 2019-10-02 RX ORDER — LEVOTHYROXINE SODIUM 0.1 MG/1
TABLET ORAL
Qty: 30 TABLET | Refills: 11 | Status: CANCELLED | OUTPATIENT
Start: 2019-10-02

## 2019-10-04 RX ORDER — LEVOTHYROXINE SODIUM 0.1 MG/1
TABLET ORAL
Qty: 30 TABLET | Refills: 5 | Status: SHIPPED | OUTPATIENT
Start: 2019-10-04 | End: 2020-05-28 | Stop reason: SDUPTHER

## 2019-10-28 ENCOUNTER — OFFICE VISIT (OUTPATIENT)
Dept: UROLOGY | Facility: CLINIC | Age: 44
End: 2019-10-28

## 2019-10-28 VITALS
HEIGHT: 64 IN | SYSTOLIC BLOOD PRESSURE: 130 MMHG | OXYGEN SATURATION: 99 % | RESPIRATION RATE: 17 BRPM | BODY MASS INDEX: 31.92 KG/M2 | TEMPERATURE: 98.5 F | HEART RATE: 78 BPM | WEIGHT: 187 LBS | DIASTOLIC BLOOD PRESSURE: 64 MMHG

## 2019-10-28 DIAGNOSIS — N39.41 URGE INCONTINENCE OF URINE: Primary | ICD-10-CM

## 2019-10-28 LAB
BILIRUB BLD-MCNC: NEGATIVE MG/DL
CLARITY, POC: CLEAR
COLOR UR: YELLOW
GLUCOSE UR STRIP-MCNC: NEGATIVE MG/DL
KETONES UR QL: NEGATIVE
LEUKOCYTE EST, POC: NEGATIVE
NITRITE UR-MCNC: NEGATIVE MG/ML
PH UR: 5.5 [PH] (ref 5–8)
PROT UR STRIP-MCNC: NEGATIVE MG/DL
RBC # UR STRIP: NEGATIVE /UL
SP GR UR: 1.02 (ref 1–1.03)
UROBILINOGEN UR QL: NORMAL

## 2019-10-28 PROCEDURE — 51798 US URINE CAPACITY MEASURE: CPT | Performed by: NURSE PRACTITIONER

## 2019-10-28 PROCEDURE — 81001 URINALYSIS AUTO W/SCOPE: CPT | Performed by: NURSE PRACTITIONER

## 2019-10-28 PROCEDURE — 99214 OFFICE O/P EST MOD 30 MIN: CPT | Performed by: NURSE PRACTITIONER

## 2019-10-28 RX ORDER — OXYBUTYNIN CHLORIDE 10 MG/1
10 TABLET, EXTENDED RELEASE ORAL DAILY
Qty: 30 TABLET | Refills: 11 | Status: SHIPPED | OUTPATIENT
Start: 2019-10-28 | End: 2020-12-14 | Stop reason: SDUPTHER

## 2019-10-28 NOTE — PROGRESS NOTES
Ms. Barnett is 44 y.o. female    Chief Complaint   Patient presents with   • Follow-up   • Urinary Incontinence       History of Present Illness  Patient presents with complaints of worsening incontinence.  She reports she has had incontinence for several years, however it has been worsening especially over the last 2.  Associated symptoms include frequency, urgency, nocturnal enuresis.  She also reports occasional lower back pain which began approximately 2 weeks ago.  She reports leakage when changing positions, with a full bladder, with urge, unpredictably.  She has limited dietary restrictions such as no alcohol or caffeine which did not help her symptoms.  She denies any history of kidney stones or urologic procedures but does report recurrent UTIs.  Is currently treatment naïve.    The following portions of the patient's history were reviewed and updated as appropriate: allergies, current medications, past family history, past medical history, past social history, past surgical history and problem list.    Review of Systems   Constitutional: Negative for chills, fatigue and fever.   Gastrointestinal: Negative for abdominal distention, abdominal pain, nausea and vomiting.   Genitourinary: Positive for enuresis, frequency and urgency. Negative for difficulty urinating, dysuria, flank pain, hematuria and pelvic pain.   Musculoskeletal: Positive for back pain.   Neurological: Negative for facial asymmetry, weakness, light-headedness and headaches.         Current Outpatient Medications:   •  ADDERALL XR 20 MG 24 hr capsule, Take 2 capsules twice a day, Disp: , Rfl: 0  •  albuterol (PROVENTIL HFA;VENTOLIN HFA) 108 (90 BASE) MCG/ACT inhaler, Inhale 2 puffs Every 4 (Four) Hours As Needed for wheezing., Disp: 8 inhaler, Rfl: 1  •  ALPRAZolam (XANAX) 0.5 MG tablet, TK 1 T PO TID PRN, Disp: , Rfl: 1  •  ALPRAZolam (XANAX) 0.5 MG tablet, Take 1 tablet by mouth 3 times daily, Disp: 90 tablet, Rfl: 2  •  ALPRAZolam (XANAX)  0.5 MG tablet, Take 1 tablet by mouth 3 times daily as needed for anxiety, Disp: 90 tablet, Rfl: 2  •  amphetamine-dextroamphetamine XR (ADDERALL XR) 20 MG 24 hr capsule, Take 2 capsules by mouth daily--fill on/after 18, Disp: 60 capsule, Rfl: 0  •  amphetamine-dextroamphetamine XR (ADDERALL XR) 20 MG 24 hr capsule, Take 2 capsules by mouth daily- Fill on or after 10/28/2018, Disp: 60 capsule, Rfl: 0  •  amphetamine-dextroamphetamine XR (ADDERALL XR) 20 MG 24 hr capsule, take 2 capsules by mouth daily, Disp: 60 capsule, Rfl: 0  •  azithromycin (ZITHROMAX Z-TANMAY) 250 MG tablet, Take 2 tablets the first day, then 1 tablet daily for 4 days., Disp: 6 tablet, Rfl: 0  •  bumetanide (BUMEX) 1 MG tablet, TAKE ONE TABLET DAILY GENERIC FOR BUMEX, Disp: 30 tablet, Rfl: 2  •  cetirizine (zyrTEC) 10 MG tablet, Take 10 mg by mouth Daily., Disp: , Rfl:   •  levothyroxine (SYNTHROID, LEVOTHROID) 100 MCG tablet, Take 1 tablet by mouth daily, Disp: 30 tablet, Rfl: 11  •  levothyroxine (SYNTHROID, LEVOTHROID) 100 MCG tablet, Take 1 tablet by mouth daily, Disp: 30 tablet, Rfl: 5  •  MethylPREDNISolone (MEDROL, TANMAY,) 4 MG tablet, Take as directed on package instructions., Disp: 21 tablet, Rfl: 0  •  promethazine-dextromethorphan (PROMETHAZINE-DM) 6.25-15 MG/5ML syrup, Take 2.5 mL by mouth 4 (Four) Times a Day As Needed for Cough., Disp: 240 mL, Rfl: 1  •  sertraline (ZOLOFT) 100 MG tablet, Take 100 mg by mouth Daily., Disp: , Rfl: 3  •  sertraline (ZOLOFT) 100 MG tablet, Take two tablets by mouth daily., Disp: 60 tablet, Rfl: 5  •  oxybutynin XL (DITROPAN-XL) 10 MG 24 hr tablet, Take 1 tablet by mouth Daily., Disp: 30 tablet, Rfl: 11    Past Medical History:   Diagnosis Date   • Bunion, right    • Cystitis    • Disease of thyroid gland    • Hypercholesteremia    • Plantar fascia syndrome        Past Surgical History:   Procedure Laterality Date   •  SECTION      x2   • CHOLECYSTECTOMY         Social History  "    Socioeconomic History   • Marital status:      Spouse name: Not on file   • Number of children: Not on file   • Years of education: Not on file   • Highest education level: Not on file   Tobacco Use   • Smoking status: Never Smoker   • Smokeless tobacco: Never Used   Substance and Sexual Activity   • Alcohol use: Yes     Comment: occisonally   • Drug use: No   • Sexual activity: Defer       Family History   Problem Relation Age of Onset   • Diabetes Mother    • Cancer Mother         kidney   • Kidney disease Mother    • Hyperlipidemia Mother    • Cancer Father         skin   • Hyperlipidemia Father    • Breast cancer Neg Hx        Objective    /64 (BP Location: Left arm, Patient Position: Sitting)   Pulse 78   Temp 98.5 °F (36.9 °C)   Resp 17   Ht 162.6 cm (64\")   Wt 84.8 kg (187 lb)   SpO2 99%   Breastfeeding? No   BMI 32.10 kg/m²     Physical Exam   Constitutional: She is oriented to person, place, and time. She appears well-developed.   HENT:   Head: Normocephalic.   Pulmonary/Chest: Effort normal. No respiratory distress.   Abdominal: Soft. She exhibits no distension.   Musculoskeletal: She exhibits no edema.   Neurological: She is alert and oriented to person, place, and time.   Skin: Skin is warm and dry.   Psychiatric: She has a normal mood and affect. Her behavior is normal.   Vitals reviewed.        Patient's Body mass index is 32.1 kg/m². BMI is above normal parameters. Recommendations include: educational material.      Orders Only on 05/02/2019   Component Date Value Ref Range Status   • Urine Culture 05/02/2019 Final report*  Final   • Result 1 05/02/2019 Comment*  Final    Comment: Staphylococcus saprophyticus  Greater than 100,000 colony forming units per mL  The CLSI does not advise routine susceptibility testing of urinary  tract isolates of Staphylococcus saprophyticus, because acute,  uncomplicated urinary tract infections caused by this organism respond  to " concentrations achieved in urine of antimicrobial agents commonly  used to treat these infections, such as nitrofurantoin, a  fluoroquinolone, or trimethoprim with or without sulfamethoxazole.  CLSI, U529-Z46, 2005.     • Specific Gravity, UA 05/02/2019 1.014  1.005 - 1.030 Final   • pH, UA 05/02/2019 7.5  5.0 - 8.0 Final   • Color, UA 05/02/2019 Yellow   Final    REFERENCE RANGE: Yellow, Straw   • Appearance, UA 05/02/2019 Clear  Clear Final   • Leukocytes, UA 05/02/2019 See below:* Negative Final    Moderate (2+)   • Protein 05/02/2019 Trace* Negative Final   • Glucose, UA 05/02/2019 Negative  Negative Final   • Ketones 05/02/2019 Negative  Negative Final   • Blood, UA 05/02/2019 See below:* Negative Final    Large (3+)   • Bilirubin, UA 05/02/2019 Negative  Negative Final   • Urobilinogen, UA 05/02/2019 Comment   Final    Comment: 0.2 E.U./dL  REFERENCE RANGE: 0.2 - 1.0 E.U./dL     • Nitrite, UA 05/02/2019 Negative  Negative Final   • WBC, UA 05/02/2019 21-30* /HPF Final    REFERENCE RANGE: None Seen, 0-2   • RBC, UA 05/02/2019 0-2  /HPF Final    REFERENCE RANGE: None Seen, 0-2   • Epithelial Cells (non renal) 05/02/2019 0-2  /HPF Final    REFERENCE RANGE: None Seen, 0-2   • Cast Type 05/02/2019 Comment   Final    HYALINE CASTS, UA            3-6              /LPF       None Seen  N   • Bacteria, UA 05/02/2019 4+* None Seen /HPF Final     Results for orders placed or performed in visit on 10/28/19   POC Urinalysis Dipstick, Multipro   Result Value Ref Range    Color Yellow Yellow, Straw, Dark Yellow, Sweta    Clarity, UA Clear Clear    Glucose, UA Negative Negative, 1000 mg/dL (3+) mg/dL    Bilirubin Negative Negative    Ketones, UA Negative Negative    Specific Gravity  1.025 1.005 - 1.030    Blood, UA Negative Negative    pH, Urine 5.5 5.0 - 8.0    Protein, POC Negative Negative mg/dL    Urobilinogen, UA Normal Normal    Nitrite, UA Negative Negative    Leukocytes Negative Negative      Bladder Scan  interpretation  Estimation of residual urine via abdominal ultrasound  Residual Urine: 134 ml  Indication: Incontinence  Position: Supine  Examination: Incremental scanning of the suprapubic area using 3 MHz transducer using copious amounts of acoustic gel.   Findings: An anechoic area was demonstrated which represented the bladder, with measurement of residual urine as noted. I inspected this myself. In that the residual urine was stable or insignificant, no treatment will be necessary at this time.     Assessment/Plan   Assessment and Plan    Marjorie was seen today for follow-up and urinary incontinence.    Diagnoses and all orders for this visit:    Urge incontinence of urine  -     POC Urinalysis Dipstick, Multipro  -     oxybutynin XL (DITROPAN-XL) 10 MG 24 hr tablet; Take 1 tablet by mouth Daily.    Patient presents with complaints of worsening incontinence.  She is currently treatment naïve so I will begin her on a trial of anticholinergic therapy with Ditropan 10 mg.  We spoke about the common side effects of dry eyes, dry mouth, constipation, urinary retention.    She will follow-up in 3 months for a bladder scan and symptom check.  If anticholinergic therapy is not effective, we will consider beta 3 agonist therapy in addition to invasive evaluation and treatment options.

## 2019-11-07 ENCOUNTER — OFFICE VISIT (OUTPATIENT)
Dept: OBSTETRICS AND GYNECOLOGY | Facility: CLINIC | Age: 44
End: 2019-11-07

## 2019-11-07 VITALS
HEIGHT: 64 IN | WEIGHT: 187 LBS | DIASTOLIC BLOOD PRESSURE: 70 MMHG | BODY MASS INDEX: 31.92 KG/M2 | SYSTOLIC BLOOD PRESSURE: 118 MMHG

## 2019-11-07 DIAGNOSIS — Z01.419 WELL WOMAN EXAM WITH ROUTINE GYNECOLOGICAL EXAM: Primary | ICD-10-CM

## 2019-11-07 DIAGNOSIS — E07.9 THYROID DYSFUNCTION: ICD-10-CM

## 2019-11-07 DIAGNOSIS — E66.3 OVERWEIGHT: ICD-10-CM

## 2019-11-07 DIAGNOSIS — Z12.39 ENCOUNTER FOR SCREENING FOR MALIGNANT NEOPLASM OF BREAST: ICD-10-CM

## 2019-11-07 DIAGNOSIS — Z12.4 CERVICAL CANCER SCREENING: ICD-10-CM

## 2019-11-07 PROCEDURE — 99386 PREV VISIT NEW AGE 40-64: CPT | Performed by: NURSE PRACTITIONER

## 2019-11-07 PROCEDURE — 87624 HPV HI-RISK TYP POOLED RSLT: CPT | Performed by: NURSE PRACTITIONER

## 2019-11-07 PROCEDURE — G0123 SCREEN CERV/VAG THIN LAYER: HCPCS | Performed by: NURSE PRACTITIONER

## 2019-11-07 NOTE — PROGRESS NOTES
Subjective   Marjorie Barnett is a 44 y.o. female  YOB: 1975        Chief Complaint   Patient presents with   • Gynecologic Exam     New patient here for yearly exam. Last yearly was done about a year ago with Dr Gutiérrez and was normal per patient. Patient has an IUD and has had it for 2 years. Patient doing well with it. Last mammo done 10/12/18 and was normal. Patient needs order for mammogram sent to [facility].  Patient takes Synthroid and would like refills of that. Patient will have labs done here to have it monitored.        HPI    The following portions of the patient's history were reviewed and updated as appropriate: allergies, current medications, past family history, past medical history, past social history, past surgical history and problem list.    Allergies   Allergen Reactions   • Codeine Itching and GI Intolerance     Chest pain       Past Medical History:   Diagnosis Date   • Anxiety    • Asthma    • Bunion, right    • Cystitis    • Disease of thyroid gland    • Hypercholesteremia    • Plantar fascia syndrome    • Seasonal allergies    • Urge incontinence        Family History   Problem Relation Age of Onset   • Diabetes Mother    • Cancer Mother         kidney   • Kidney disease Mother    • Hyperlipidemia Mother    • Cancer Father         skin   • Hyperlipidemia Father    • Breast cancer Neg Hx    • Ovarian cancer Neg Hx    • Colon cancer Neg Hx        Social History     Socioeconomic History   • Marital status:      Spouse name: Not on file   • Number of children: Not on file   • Years of education: Not on file   • Highest education level: Not on file   Tobacco Use   • Smoking status: Never Smoker   • Smokeless tobacco: Never Used   Substance and Sexual Activity   • Alcohol use: Yes     Comment: occisonally   • Drug use: No   • Sexual activity: Defer         Current Outpatient Medications:   •  ADDERALL XR 20 MG 24 hr capsule, Take 2 capsules twice a day, Disp: , Rfl: 0  •   "albuterol (PROVENTIL HFA;VENTOLIN HFA) 108 (90 BASE) MCG/ACT inhaler, Inhale 2 puffs Every 4 (Four) Hours As Needed for wheezing., Disp: 8 inhaler, Rfl: 1  •  ALPRAZolam (XANAX) 0.5 MG tablet, Take 1 tablet by mouth 3 times daily as needed for anxiety, Disp: 90 tablet, Rfl: 2  •  cetirizine (zyrTEC) 10 MG tablet, Take 10 mg by mouth Daily., Disp: , Rfl:   •  levothyroxine (SYNTHROID, LEVOTHROID) 100 MCG tablet, Take 1 tablet by mouth daily, Disp: 30 tablet, Rfl: 5  •  oxybutynin XL (DITROPAN-XL) 10 MG 24 hr tablet, Take 1 tablet by mouth Daily., Disp: 30 tablet, Rfl: 11  •  sertraline (ZOLOFT) 100 MG tablet, Take two tablets by mouth daily., Disp: 60 tablet, Rfl: 5  •  amphetamine-dextroamphetamine XR (ADDERALL XR) 20 MG 24 hr capsule, Take 2 capsules by mouth daily., Disp: 60 capsule, Rfl: 0  •  Liraglutide -Weight Management (SAXENDA) 18 MG/3ML solution pen-injector, Inject under the skin as directed 0.6 mg x 1 week, 1.2 mg x 1 week, 1.8 mg x 1 week, 2.4 mg x 1 week, then 3 mg daily, Disp: 5 pen, Rfl: 5    No LMP recorded. Patient has had an implant.    Sexual History:         Could not be calculated    Past Surgical History:   Procedure Laterality Date   •  SECTION      x2   • CHOLECYSTECTOMY         Review of Systems    Objective   Physical Exam      Vitals:    19 1048   BP: 118/70   Weight: 84.8 kg (187 lb)   Height: 162.6 cm (64\")       Marjorie was seen today for gynecologic exam.    Diagnoses and all orders for this visit:    Well woman exam with routine gynecological exam  Comments:  Normal well woman exam.  ThinPrep Pap smear done.  Mammogram ordered.  Orders:  -     Liquid-based Pap Smear, Screening  -     HPV DNA Probe, Direct - ThinPrep Vial,; Future  -     HPV DNA Probe, Direct - ThinPrep Vial, Cervix    Cervical cancer screening  Comments:  ThinPrep Pap smear done.  Orders:  -     Liquid-based Pap Smear, Screening  -     HPV DNA Probe, Direct - ThinPrep Vial,; Future  -     HPV DNA Probe, " Direct - ThinPrep Vial, Cervix    Encounter for screening for malignant neoplasm of breast  Comments:  Mammogram ordered.  Orders:  -     Mammo Screening Digital Tomosynthesis Bilateral With CAD    Thyroid dysfunction  Comments:  Patient requests refills be sent to pharmacy.   Advised that I could refill through next follow up with PCP.  Refills sent to pharmacy.    Orders:  -     T3, Uptake  -     T4, Free  -     TSH    Overweight  Comments:  Patient wants to discuss weight loss aids.  Discussed options and risks/benefits.  Patient wants to try Saxenda.  Thoroughly instructed in use. RX sent to pharm    Other orders  -     Liraglutide -Weight Management (SAXENDA) 18 MG/3ML solution pen-injector; Inject under the skin as directed 0.6 mg x 1 week, 1.2 mg x 1 week, 1.8 mg x 1 week, 2.4 mg x 1 week, then 3 mg daily          Patient's Body mass index is 32.1 kg/m². BMI is above normal parameters. Recommendations include: exercise counseling, nutrition counseling and pharmacological intervention.             Non-Smoker    MyChart Instructions Given

## 2019-11-11 LAB
GEN CATEG CVX/VAG CYTO-IMP: NORMAL
HPV I/H RISK 4 DNA CVX QL PROBE+SIG AMP: NOT DETECTED
LAB AP CASE REPORT: NORMAL
LAB AP GYN ADDITIONAL INFORMATION: NORMAL
LAB AP GYN OTHER FINDINGS: NORMAL
PATH INTERP SPEC-IMP: NORMAL
STAT OF ADQ CVX/VAG CYTO-IMP: NORMAL

## 2019-11-13 ENCOUNTER — PRIOR AUTHORIZATION (OUTPATIENT)
Dept: OBSTETRICS AND GYNECOLOGY | Facility: CLINIC | Age: 44
End: 2019-11-13

## 2019-11-13 NOTE — TELEPHONE ENCOUNTER
Weight loss medication is a plan exclusion on this patients insurance and any medication will have to be paid for out of pocket

## 2019-11-15 ENCOUNTER — HOSPITAL ENCOUNTER (OUTPATIENT)
Dept: MAMMOGRAPHY | Facility: HOSPITAL | Age: 44
Discharge: HOME | End: 2019-11-15
Admitting: NURSE PRACTITIONER

## 2019-11-15 PROCEDURE — 77067 SCR MAMMO BI INCL CAD: CPT

## 2019-11-15 PROCEDURE — 77063 BREAST TOMOSYNTHESIS BI: CPT

## 2019-11-22 ENCOUNTER — TELEPHONE (OUTPATIENT)
Dept: OBSTETRICS AND GYNECOLOGY | Facility: CLINIC | Age: 44
End: 2019-11-22

## 2019-11-27 NOTE — TELEPHONE ENCOUNTER
Please let patient know that I can prescribe Phentermine but she'll have to come in and sign a controlled substance agreement

## 2020-03-04 DIAGNOSIS — F41.9 ANXIETY: Primary | ICD-10-CM

## 2020-03-04 NOTE — TELEPHONE ENCOUNTER
PT called to request refills on the following medications:  · ALPRAZolam (XANAX) 0.5 MG tablet  · sertraline (ZOLOFT) 100 MG tablet    PT states PCP has prescribed the medications in the past, she's completely out at this time. States she's seeing provider that regularly prescribes the medications on Tuesday: 3/10/20, is only requesting a 7 day supply.    Confirmed Pharmacy:   [facility] - [place], [place] - [address] [place] [address] [phone]  - [phone]   [address] [place] [id number]  Phone: [phone] Fax: [phone]    Please call Marjorie back with any questions/concerns: [phone]

## 2020-03-05 RX ORDER — SERTRALINE HYDROCHLORIDE 100 MG/1
TABLET, FILM COATED ORAL
Qty: 60 TABLET | Refills: 1 | Status: SHIPPED | OUTPATIENT
Start: 2020-03-05 | End: 2020-04-22 | Stop reason: SDUPTHER

## 2020-03-05 RX ORDER — ALPRAZOLAM 0.5 MG/1
TABLET ORAL
Qty: 90 TABLET | Refills: 0 | Status: SHIPPED | OUTPATIENT
Start: 2020-03-05 | End: 2020-03-10

## 2020-03-10 ENCOUNTER — HOSPITAL ENCOUNTER (OUTPATIENT)
Facility: HOSPITAL | Age: 45
Setting detail: OBSERVATION
Discharge: HOME | End: 2020-03-11
Attending: EMERGENCY MEDICINE | Admitting: FAMILY MEDICINE

## 2020-03-10 ENCOUNTER — APPOINTMENT (OUTPATIENT)
Dept: GENERAL RADIOLOGY | Facility: HOSPITAL | Age: 45
End: 2020-03-10

## 2020-03-10 DIAGNOSIS — R94.31 ABNORMAL ECG: ICD-10-CM

## 2020-03-10 DIAGNOSIS — R07.9 CHEST PAIN, UNSPECIFIED TYPE: Primary | ICD-10-CM

## 2020-03-10 DIAGNOSIS — I20.0 UNSTABLE ANGINA (HCC): ICD-10-CM

## 2020-03-10 LAB
ALBUMIN SERPL-MCNC: 4.3 G/DL (ref 3.5–5.2)
ALBUMIN/GLOB SERPL: 1.5 G/DL
ALP SERPL-CCNC: 94 U/L (ref 39–117)
ALT SERPL W P-5'-P-CCNC: 10 U/L (ref 1–33)
ANION GAP SERPL CALCULATED.3IONS-SCNC: 10 MMOL/L (ref 5–15)
AST SERPL-CCNC: 13 U/L (ref 1–32)
BASOPHILS # BLD AUTO: 0.07 10*3/MM3 (ref 0–0.2)
BASOPHILS NFR BLD AUTO: 0.8 % (ref 0–1.5)
BILIRUB SERPL-MCNC: <0.2 MG/DL (ref 0.2–1.2)
BUN BLD-MCNC: 14 MG/DL (ref 6–20)
BUN/CREAT SERPL: 20.6 (ref 7–25)
CALCIUM SPEC-SCNC: 9 MG/DL (ref 8.6–10.5)
CHLORIDE SERPL-SCNC: 101 MMOL/L (ref 98–107)
CO2 SERPL-SCNC: 26 MMOL/L (ref 22–29)
CREAT BLD-MCNC: 0.68 MG/DL (ref 0.57–1)
D DIMER PPP FEU-MCNC: 0.29 MG/L (FEU) (ref 0–0.5)
DEPRECATED RDW RBC AUTO: 42.1 FL (ref 37–54)
EOSINOPHIL # BLD AUTO: 0.32 10*3/MM3 (ref 0–0.4)
EOSINOPHIL NFR BLD AUTO: 3.7 % (ref 0.3–6.2)
ERYTHROCYTE [DISTWIDTH] IN BLOOD BY AUTOMATED COUNT: 14.3 % (ref 12.3–15.4)
GFR SERPL CREATININE-BSD FRML MDRD: 95 ML/MIN/1.73
GLOBULIN UR ELPH-MCNC: 2.9 GM/DL
GLUCOSE BLD-MCNC: 100 MG/DL (ref 65–99)
HCT VFR BLD AUTO: 38.3 % (ref 34–46.6)
HGB BLD-MCNC: 12.7 G/DL (ref 12–15.9)
HOLD SPECIMEN: NORMAL
HOLD SPECIMEN: NORMAL
IMM GRANULOCYTES # BLD AUTO: 0.06 10*3/MM3 (ref 0–0.05)
IMM GRANULOCYTES NFR BLD AUTO: 0.7 % (ref 0–0.5)
LYMPHOCYTES # BLD AUTO: 2.26 10*3/MM3 (ref 0.7–3.1)
LYMPHOCYTES NFR BLD AUTO: 26.2 % (ref 19.6–45.3)
MCH RBC QN AUTO: 27.1 PG (ref 26.6–33)
MCHC RBC AUTO-ENTMCNC: 33.2 G/DL (ref 31.5–35.7)
MCV RBC AUTO: 81.8 FL (ref 79–97)
MONOCYTES # BLD AUTO: 0.63 10*3/MM3 (ref 0.1–0.9)
MONOCYTES NFR BLD AUTO: 7.3 % (ref 5–12)
NEUTROPHILS # BLD AUTO: 5.29 10*3/MM3 (ref 1.7–7)
NEUTROPHILS NFR BLD AUTO: 61.3 % (ref 42.7–76)
NRBC BLD AUTO-RTO: 0 /100 WBC (ref 0–0.2)
PLATELET # BLD AUTO: 322 10*3/MM3 (ref 140–450)
PMV BLD AUTO: 9.2 FL (ref 6–12)
POTASSIUM BLD-SCNC: 4.2 MMOL/L (ref 3.5–5.2)
PROT SERPL-MCNC: 7.2 G/DL (ref 6–8.5)
RBC # BLD AUTO: 4.68 10*6/MM3 (ref 3.77–5.28)
SODIUM BLD-SCNC: 137 MMOL/L (ref 136–145)
TROPONIN T SERPL-MCNC: <0.01 NG/ML (ref 0–0.03)
TROPONIN T SERPL-MCNC: <0.01 NG/ML (ref 0–0.03)
WBC NRBC COR # BLD: 8.63 10*3/MM3 (ref 3.4–10.8)
WHOLE BLOOD HOLD SPECIMEN: NORMAL
WHOLE BLOOD HOLD SPECIMEN: NORMAL

## 2020-03-10 PROCEDURE — 71045 X-RAY EXAM CHEST 1 VIEW: CPT

## 2020-03-10 PROCEDURE — 84484 ASSAY OF TROPONIN QUANT: CPT | Performed by: EMERGENCY MEDICINE

## 2020-03-10 PROCEDURE — 85379 FIBRIN DEGRADATION QUANT: CPT | Performed by: EMERGENCY MEDICINE

## 2020-03-10 PROCEDURE — 99284 EMERGENCY DEPT VISIT MOD MDM: CPT

## 2020-03-10 PROCEDURE — G0378 HOSPITAL OBSERVATION PER HR: HCPCS

## 2020-03-10 PROCEDURE — 85025 COMPLETE CBC W/AUTO DIFF WBC: CPT | Performed by: EMERGENCY MEDICINE

## 2020-03-10 PROCEDURE — 80053 COMPREHEN METABOLIC PANEL: CPT | Performed by: EMERGENCY MEDICINE

## 2020-03-10 PROCEDURE — 93010 ELECTROCARDIOGRAM REPORT: CPT | Performed by: INTERNAL MEDICINE

## 2020-03-10 PROCEDURE — 93005 ELECTROCARDIOGRAM TRACING: CPT | Performed by: EMERGENCY MEDICINE

## 2020-03-10 RX ORDER — LORAZEPAM 1 MG/1
1 TABLET ORAL NIGHTLY PRN
Status: DISCONTINUED | OUTPATIENT
Start: 2020-03-10 | End: 2020-03-11 | Stop reason: HOSPADM

## 2020-03-10 RX ORDER — SODIUM CHLORIDE 0.9 % (FLUSH) 0.9 %
10 SYRINGE (ML) INJECTION AS NEEDED
Status: DISCONTINUED | OUTPATIENT
Start: 2020-03-10 | End: 2020-03-11 | Stop reason: HOSPADM

## 2020-03-10 RX ORDER — ASPIRIN 81 MG/1
324 TABLET, CHEWABLE ORAL ONCE
Status: COMPLETED | OUTPATIENT
Start: 2020-03-10 | End: 2020-03-10

## 2020-03-10 RX ORDER — NITROGLYCERIN 0.4 MG/1
0.4 TABLET SUBLINGUAL
Status: DISCONTINUED | OUTPATIENT
Start: 2020-03-10 | End: 2020-03-11 | Stop reason: HOSPADM

## 2020-03-10 RX ADMIN — ASPIRIN 81 MG 324 MG: 81 TABLET ORAL at 18:08

## 2020-03-10 NOTE — ED PROVIDER NOTES
Subjective   Patient is a 44-year-old female who presents to the ER with chest pain.  Patient states around 2:30 PM she had the sudden onset of nausea, diaphoresis, headache, shortness of breath and jaw pain.  Patient states she then developed midsternal chest pain that she describes as burning in nature and radiated down to her epigastric abdominal region.  Patient states her pain was constant until around 5 PM and then resolved and she is currently pain-free.  Patient has never had pain like this previously.  This was in no relation to eating.  She denies any fever, abdominal pain, vomiting, diarrhea, urinary changes, neurologic changes, cough, leg pain or swelling.  Patient was seen in urgent treatment center and sent here for evaluation.          Review of Systems   Constitutional: Positive for diaphoresis.   Eyes: Negative.    Respiratory: Positive for shortness of breath.    Cardiovascular: Positive for chest pain.   Gastrointestinal: Positive for abdominal pain and nausea.   Endocrine: Negative.    Genitourinary: Negative.    Musculoskeletal: Negative.    Skin: Negative.    Allergic/Immunologic: Negative.    Neurological: Positive for headaches.   Hematological: Negative.    Psychiatric/Behavioral: Negative.    All other systems reviewed and are negative.      Past Medical History:   Diagnosis Date   • Anxiety    • Asthma    • Bunion, right    • Cystitis    • Disease of thyroid gland    • Hypercholesteremia    • Plantar fascia syndrome    • Seasonal allergies    • Urge incontinence        Allergies   Allergen Reactions   • Codeine Itching and GI Intolerance     Chest pain       Past Surgical History:   Procedure Laterality Date   •  SECTION      x2   • CHOLECYSTECTOMY         Family History   Problem Relation Age of Onset   • Diabetes Mother    • Cancer Mother         kidney   • Kidney disease Mother    • Hyperlipidemia Mother    • Cancer Father         skin   • Hyperlipidemia Father    • Breast  cancer Neg Hx    • Ovarian cancer Neg Hx    • Colon cancer Neg Hx        Social History     Socioeconomic History   • Marital status:      Spouse name: Not on file   • Number of children: Not on file   • Years of education: Not on file   • Highest education level: Not on file   Tobacco Use   • Smoking status: Never Smoker   • Smokeless tobacco: Never Used   Substance and Sexual Activity   • Alcohol use: Yes     Comment: occisonally   • Drug use: No   • Sexual activity: Defer           Objective   Physical Exam   Constitutional: She is oriented to person, place, and time. She appears well-developed and well-nourished.   HENT:   Head: Normocephalic and atraumatic.   Eyes: Pupils are equal, round, and reactive to light. Conjunctivae are normal.   Neck: Normal range of motion.   Cardiovascular: Normal rate, regular rhythm and normal heart sounds.   Pulmonary/Chest: Effort normal and breath sounds normal.   Abdominal: Soft. There is no tenderness.   Musculoskeletal: Normal range of motion. She exhibits no edema or deformity.   Neurological: She is alert and oriented to person, place, and time. She has normal strength.   Skin: Skin is warm.   Psychiatric: She has a normal mood and affect. Her behavior is normal.   Nursing note and vitals reviewed.      Procedures           ED Course      ECG1: Normal sinus rhythm with a rate of 65, T wave inversion in V2 and 3  ECG2: Normal sinus rhythm with a rate of 64, no acute ischemia or infarction    Lab Results (last 24 hours)     Procedure Component Value Units Date/Time    POCT Influenza A/B [[id number]]  (Normal) Collected:  03/10/20 1552    Specimen:  Swab Updated:  03/10/20 1553     Rapid Influenza A Ag Negative     Rapid Influenza B Ag Negative     Internal Control Passed     Lot Number [id number]     Expiration Date 10/31/21    CBC & Differential [[id number]] Collected:  03/10/20 1609    Specimen:  Blood Updated:  03/10/20 1619    Narrative:       The following orders were  created for panel order CBC & Differential.  Procedure                               Abnormality         Status                     ---------                               -----------         ------                     CBC Auto Differential[[id number]]        Abnormal            Final result                 Please view results for these tests on the individual orders.    Comprehensive Metabolic Panel [[id number]]  (Abnormal) Collected:  03/10/20 1609    Specimen:  Blood Updated:  03/10/20 1639     Glucose 100 mg/dL      BUN 14 mg/dL      Creatinine 0.68 mg/dL      Sodium 137 mmol/L      Potassium 4.2 mmol/L      Chloride 101 mmol/L      CO2 26.0 mmol/L      Calcium 9.0 mg/dL      Total Protein 7.2 g/dL      Albumin 4.30 g/dL      ALT (SGPT) 10 U/L      AST (SGOT) 13 U/L      Alkaline Phosphatase 94 U/L      Total Bilirubin <0.2 mg/dL      eGFR Non African Amer 95 mL/min/1.73      Globulin 2.9 gm/dL      A/G Ratio 1.5 g/dL      BUN/Creatinine Ratio 20.6     Anion Gap 10.0 mmol/L     Narrative:       GFR Normal >60  Chronic Kidney Disease <60  Kidney Failure <15      Troponin [[id number]]  (Normal) Collected:  03/10/20 1609    Specimen:  Blood Updated:  03/10/20 1639     Troponin T <0.010 ng/mL     Narrative:       Troponin T Reference Range:  <= 0.03 ng/mL-   Negative for AMI  >0.03 ng/mL-     Abnormal for myocardial necrosis.  Clinicians would have to utilize clinical acumen, EKG, Troponin and serial changes to determine if it is an Acute Myocardial Infarction or myocardial injury due to an underlying chronic condition.       Results may be falsely decreased if patient taking Biotin.      CBC Auto Differential [[id number]]  (Abnormal) Collected:  03/10/20 1609    Specimen:  Blood Updated:  03/10/20 1619     WBC 8.63 10*3/mm3      RBC 4.68 10*6/mm3      Hemoglobin 12.7 g/dL      Hematocrit 38.3 %      MCV 81.8 fL      MCH 27.1 pg      MCHC 33.2 g/dL      RDW 14.3 %      RDW-SD 42.1 fl      MPV 9.2 fL      Platelets 322  10*3/mm3      Neutrophil % 61.3 %      Lymphocyte % 26.2 %      Monocyte % 7.3 %      Eosinophil % 3.7 %      Basophil % 0.8 %      Immature Grans % 0.7 %      Neutrophils, Absolute 5.29 10*3/mm3      Lymphocytes, Absolute 2.26 10*3/mm3      Monocytes, Absolute 0.63 10*3/mm3      Eosinophils, Absolute 0.32 10*3/mm3      Basophils, Absolute 0.07 10*3/mm3      Immature Grans, Absolute 0.06 10*3/mm3      nRBC 0.0 /100 WBC     D-dimer, Quantitative [[id number]]  (Normal) Collected:  03/10/20 1609    Specimen:  Blood Updated:  03/10/20 1800     D-Dimer, Quantitative 0.29 mg/L (FEU)     Narrative:       Reference Range is 0-0.50 mg/L FEU. However, results <0.50 mg/L FEU tends to rule out DVT or PE. Results >0.50 mg/L FEU are not useful in predicting absence or presence of DVT or PE.      Troponin [[id number]]  (Normal) Collected:  03/10/20 1906    Specimen:  Blood Updated:  03/10/20 1930     Troponin T <0.010 ng/mL     Narrative:       Troponin T Reference Range:  <= 0.03 ng/mL-   Negative for AMI  >0.03 ng/mL-     Abnormal for myocardial necrosis.  Clinicians would have to utilize clinical acumen, EKG, Troponin and serial changes to determine if it is an Acute Myocardial Infarction or myocardial injury due to an underlying chronic condition.       Results may be falsely decreased if patient taking Biotin.          XR Chest 1 View   Final Result   1. No acute cardiopulmonary process.       This report was finalized on 03/10/2020 18:39 by Dr. Ryne Perry MD.        Labs were normal.  Chest x-ray was normal.  First EKG was abnormal and this occurred when the patient was having pain.  Second EKG normalized when pain normalized.  This is concerning for unstable angina.  Patient was then admitted to Dr. Griggs service for further evaluation.                                     [facility]    Final diagnoses:   Chest pain, unspecified type   Abnormal ECG   Unstable angina (CMS/HCC)            Juanita Jang MD  03/10/20 2058

## 2020-03-11 ENCOUNTER — APPOINTMENT (OUTPATIENT)
Dept: CARDIOLOGY | Facility: HOSPITAL | Age: 45
End: 2020-03-11

## 2020-03-11 VITALS
OXYGEN SATURATION: 96 % | BODY MASS INDEX: 32.74 KG/M2 | HEIGHT: 64 IN | HEART RATE: 77 BPM | SYSTOLIC BLOOD PRESSURE: 111 MMHG | DIASTOLIC BLOOD PRESSURE: 82 MMHG | RESPIRATION RATE: 16 BRPM | WEIGHT: 191.8 LBS | TEMPERATURE: 98.4 F

## 2020-03-11 LAB
BH CV STRESS BP STAGE 1: NORMAL
BH CV STRESS BP STAGE 2: NORMAL
BH CV STRESS BP STAGE 3: NORMAL
BH CV STRESS DURATION MIN STAGE 1: 3
BH CV STRESS DURATION MIN STAGE 2: 3
BH CV STRESS DURATION MIN STAGE 3: 3
BH CV STRESS DURATION SEC STAGE 1: 0
BH CV STRESS DURATION SEC STAGE 2: 0
BH CV STRESS DURATION SEC STAGE 3: 0
BH CV STRESS GRADE STAGE 1: 10
BH CV STRESS GRADE STAGE 2: 12
BH CV STRESS GRADE STAGE 3: 14
BH CV STRESS HR STAGE 1: 118
BH CV STRESS HR STAGE 2: 131
BH CV STRESS HR STAGE 3: 151
BH CV STRESS METS STAGE 1: 5
BH CV STRESS METS STAGE 2: 7.5
BH CV STRESS METS STAGE 3: 10
BH CV STRESS PROTOCOL 1: NORMAL
BH CV STRESS RECOVERY BP: NORMAL MMHG
BH CV STRESS RECOVERY HR: 87 BPM
BH CV STRESS SPEED STAGE 1: 1.7
BH CV STRESS SPEED STAGE 2: 2.5
BH CV STRESS SPEED STAGE 3: 3.4
BH CV STRESS STAGE 1: 1
BH CV STRESS STAGE 2: 2
BH CV STRESS STAGE 3: 3
MAXIMAL PREDICTED HEART RATE: 178 BPM
PERCENT MAX PREDICTED HR: 84.83 %
STRESS BASELINE BP: NORMAL MMHG
STRESS BASELINE HR: 75 BPM
STRESS PERCENT HR: 100 %
STRESS POST ESTIMATED WORKLOAD: 10 METS
STRESS POST EXERCISE DUR MIN: 9 MIN
STRESS POST EXERCISE DUR SEC: 0 SEC
STRESS POST PEAK BP: NORMAL MMHG
STRESS POST PEAK HR: 151 BPM
STRESS TARGET HR: 151 BPM
T4 FREE SERPL-MCNC: 0.8 NG/DL (ref 0.93–1.7)
TROPONIN T SERPL-MCNC: <0.01 NG/ML (ref 0–0.03)
TROPONIN T SERPL-MCNC: <0.01 NG/ML (ref 0–0.03)
TSH SERPL DL<=0.05 MIU/L-ACNC: 6.53 UIU/ML (ref 0.27–4.2)

## 2020-03-11 PROCEDURE — 25010000002 PERFLUTREN 6.52 MG/ML SUSPENSION: Performed by: INTERNAL MEDICINE

## 2020-03-11 PROCEDURE — 84443 ASSAY THYROID STIM HORMONE: CPT | Performed by: FAMILY MEDICINE

## 2020-03-11 PROCEDURE — 84484 ASSAY OF TROPONIN QUANT: CPT | Performed by: FAMILY MEDICINE

## 2020-03-11 PROCEDURE — 84439 ASSAY OF FREE THYROXINE: CPT | Performed by: FAMILY MEDICINE

## 2020-03-11 PROCEDURE — 96372 THER/PROPH/DIAG INJ SC/IM: CPT

## 2020-03-11 PROCEDURE — G0378 HOSPITAL OBSERVATION PER HR: HCPCS

## 2020-03-11 PROCEDURE — 25010000002 ENOXAPARIN PER 10 MG: Performed by: FAMILY MEDICINE

## 2020-03-11 PROCEDURE — 99204 OFFICE O/P NEW MOD 45 MIN: CPT | Performed by: INTERNAL MEDICINE

## 2020-03-11 PROCEDURE — 93350 STRESS TTE ONLY: CPT

## 2020-03-11 PROCEDURE — 93018 CV STRESS TEST I&R ONLY: CPT | Performed by: INTERNAL MEDICINE

## 2020-03-11 PROCEDURE — 93352 ADMIN ECG CONTRAST AGENT: CPT | Performed by: INTERNAL MEDICINE

## 2020-03-11 PROCEDURE — 93017 CV STRESS TEST TRACING ONLY: CPT

## 2020-03-11 PROCEDURE — 93350 STRESS TTE ONLY: CPT | Performed by: INTERNAL MEDICINE

## 2020-03-11 PROCEDURE — 99234 HOSP IP/OBS SM DT SF/LOW 45: CPT | Performed by: FAMILY MEDICINE

## 2020-03-11 RX ORDER — SERTRALINE HYDROCHLORIDE 100 MG/1
100 TABLET, FILM COATED ORAL NIGHTLY
Status: DISCONTINUED | OUTPATIENT
Start: 2020-03-11 | End: 2020-03-11 | Stop reason: HOSPADM

## 2020-03-11 RX ORDER — OXYBUTYNIN CHLORIDE 5 MG/1
10 TABLET, EXTENDED RELEASE ORAL DAILY
Status: DISCONTINUED | OUTPATIENT
Start: 2020-03-11 | End: 2020-03-11 | Stop reason: HOSPADM

## 2020-03-11 RX ORDER — LEVOTHYROXINE SODIUM 0.1 MG/1
100 TABLET ORAL
Status: DISCONTINUED | OUTPATIENT
Start: 2020-03-11 | End: 2020-03-11 | Stop reason: HOSPADM

## 2020-03-11 RX ORDER — CETIRIZINE HYDROCHLORIDE 10 MG/1
10 TABLET ORAL DAILY
Status: DISCONTINUED | OUTPATIENT
Start: 2020-03-11 | End: 2020-03-11 | Stop reason: HOSPADM

## 2020-03-11 RX ADMIN — LORAZEPAM 1 MG: 1 TABLET ORAL at 00:13

## 2020-03-11 RX ADMIN — CETIRIZINE HYDROCHLORIDE 10 MG: 10 TABLET, FILM COATED ORAL at 09:48

## 2020-03-11 RX ADMIN — OXYBUTYNIN CHLORIDE 10 MG: 5 TABLET, EXTENDED RELEASE ORAL at 09:48

## 2020-03-11 RX ADMIN — ENOXAPARIN SODIUM 40 MG: 40 INJECTION SUBCUTANEOUS at 09:48

## 2020-03-11 RX ADMIN — PERFLUTREN 8.48 MG: 6.52 INJECTION, SUSPENSION INTRAVENOUS at 12:15

## 2020-03-11 RX ADMIN — LEVOTHYROXINE SODIUM 100 MCG: 100 TABLET ORAL at 09:48

## 2020-03-11 NOTE — CONSULTS
[facility] HEART GROUP CONSULT NOTE    Referring Provider: Jacob Simms MD    Reason for Consultation: Chest pain    Chief complaint:   Chief Complaint   Patient presents with   • Chest Pain       Subjective .     History of present illness:  Marjorie Barnett is a 44 y.o. female with history of anxiety, and thyroid disease who presented to the urgent care yesterday complaining of chest pain. She was advised to come to [facility] ER for further evaluation of chest pain. Patient reported that she had a sudden onset of nausea, shortness of breath, jaw pain, diaphoresis and headache that started about 2:30 yesterday afternoon. She reports that she then started with midsternal chest pain that radiated down to her epigastric area. Patient states that it was no relation to eating. She rates pain was an 8/10 and describes it as a burning. Patient states that she felt like she needed to lay down while pain was present. Patient reports that pain lasted constantly until about 5:00 pm and then resolved on its own. She reports that she had some shortness of breath in ER intermittently; she states that she actually prefers to lay down to relieve the shortness of breath some. She reports that she has a mild burning sensation this am that she rates a 2/10 but denies any pain.  Patients first EKG was taken in the ER while patient was still having pain and had t-wave inversion noted in leads V2 and V3. The second EKG was taken when patient was pain free and these were resolved. CXR in ER was normal. CBC and CMP were unremarkable. Patient had troponin <0.010 x 4 now. Patient was given Ativan last night and states that she slept all night. She denies any previous cardiac history or family cardiac history. She denies smoking. 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 Patient denies any heart racing or palpitations. She denies any  decrease in stamina or fatigue. She denies any leg swelling, orthopnea or PND.     History  Past Medical History:   Diagnosis Date   • Anxiety    • Asthma    • Bunion, right    • Cystitis    • Disease of thyroid gland    • Hypercholesteremia    • Plantar fascia syndrome    • Seasonal allergies    • Urge incontinence    ,   Past Surgical History:   Procedure Laterality Date   •  SECTION      x2   • CHOLECYSTECTOMY     ,   Family History   Problem Relation Age of Onset   • Diabetes Mother    • Cancer Mother         kidney   • Kidney disease Mother    • Hyperlipidemia Mother    • Cancer Father         skin   • Hyperlipidemia Father    • Breast cancer Neg Hx    • Ovarian cancer Neg Hx    • Colon cancer Neg Hx    ,   Social History     Tobacco Use   • Smoking status: Never Smoker   • Smokeless tobacco: Never Used   Substance Use Topics   • Alcohol use: Yes     Comment: occisonally   • Drug use: No       Medications    Prior to Admission medications    Medication Sig Start Date End Date Taking? Authorizing Provider   albuterol (PROVENTIL HFA;VENTOLIN HFA) 108 (90 BASE) MCG/ACT inhaler Inhale 2 puffs Every 4 (Four) Hours As Needed for wheezing. 3/3/17   Jacob Simms MD   cetirizine (zyrTEC) 10 MG tablet Take 10 mg by mouth Daily.    Provider, MD Percy   levothyroxine (SYNTHROID, LEVOTHROID) 100 MCG tablet Take 1 tablet by mouth daily 10/4/19   Jacob Simms MD   oxybutynin XL (DITROPAN-XL) 10 MG 24 hr tablet Take 1 tablet by mouth Daily. 10/28/19   Melanie Cuellar APRN   sertraline (ZOLOFT) 100 MG tablet Take 2 tablets by mouth daily 3/5/20   Jacob Simms MD       Current Facility-Administered Medications   Medication Dose Route Frequency Provider Last Rate Last Dose   • cetirizine (zyrTEC) tablet 10 mg  10 mg Oral Daily Jacob Simms MD   10 mg at 20 0948   • enoxaparin (LOVENOX) syringe 40 mg  40 mg Subcutaneous Q24H Jacob Simms MD   40 mg at  03/11/20 0948   • levothyroxine (SYNTHROID, LEVOTHROID) tablet 100 mcg  100 mcg Oral Q AM Jacob Simms MD   100 mcg at 03/11/20 0948   • LORazepam (ATIVAN) tablet 1 mg  1 mg Oral Nightly PRN Jacob Simms MD   1 mg at 03/11/20 0013   • nitroglycerin (NITROSTAT) SL tablet 0.4 mg  0.4 mg Sublingual Q5 Min PRN Jacob Simms MD       • oxybutynin XL (DITROPAN-XL) 24 hr tablet 10 mg  10 mg Oral Daily Jacob Simms MD   10 mg at 03/11/20 0948   • Pharmacy to Dose enoxaparin (LOVENOX)   Does not apply Continuous PRN Jacob Simms MD       • sertraline (ZOLOFT) tablet 100 mg  100 mg Oral Nightly Jacob Simms MD       • sodium chloride 0.9 % flush 10 mL  10 mL Intravenous PRN Jacob Simms MD         Allergies:  Codeine    Review of Systems  Review of Systems   Constitution: Negative for decreased appetite, diaphoresis, malaise/fatigue and night sweats.   HENT: Negative for congestion and nosebleeds.    Eyes: Negative for visual disturbance.   Cardiovascular: Negative for chest pain, dyspnea on exertion, irregular heartbeat, leg swelling, near-syncope, orthopnea, palpitations, paroxysmal nocturnal dyspnea and syncope.   Respiratory: Negative for cough, shortness of breath and wheezing.    Hematologic/Lymphatic: Does not bruise/bleed easily.   Musculoskeletal: Negative for arthritis and joint pain.   Gastrointestinal: Negative for abdominal pain and change in bowel habit.   Genitourinary: Negative for hematuria and urgency.   Neurological: Negative for dizziness, headaches, light-headedness, loss of balance and weakness.   Psychiatric/Behavioral: Negative for altered mental status. The patient is not nervous/anxious.    All other systems reviewed and are negative.      Objective     Physical Exam:  Patient Vitals for the past 24 hrs:   BP Temp Temp src Pulse Resp SpO2 Height Weight   03/11/20 0700 102/56 98.1 °F (36.7 °C) Oral 72 16 96 % -- --   03/11/20 0048  "132/79 97.8 °F (36.6 °C) Oral 73 16 100 % 162.6 cm (64.02\") 87 kg (191 lb 12.8 oz)   03/10/20 1900 133/96 -- -- 74 -- 99 % -- --   03/10/20 1830 132/84 -- -- 68 -- 99 % -- --   03/10/20 1815 125/84 -- -- 84 -- 99 % -- --   03/10/20 1800 122/85 -- -- 66 -- 99 % -- --   03/10/20 1745 126/83 -- -- 68 -- 100 % -- --   03/10/20 1735 125/82 -- -- 62 -- 99 % -- --   03/10/20 1725 138/89 -- -- 66 -- 98 % -- --   03/10/20 1614 135/81 97.4 °F (36.3 °C) -- 74 16 100 % 162.6 cm (64\") 83.9 kg (185 lb)     Telemetry:   Sinus tachycardia at rate of 105 this am. She has been running sinus rhythm rate 70-80's.     Physical Exam   Constitutional: She is oriented to person, place, and time. She appears well-developed and well-nourished. No distress.   HENT:   Head: Normocephalic and atraumatic.   Nose: Nose normal.   Eyes: Pupils are equal, round, and reactive to light.   Neck: Normal range of motion. Neck supple. Carotid bruit is not present.   Cardiovascular: Normal rate, regular rhythm and normal heart sounds.   No murmur heard.  Pulmonary/Chest: Effort normal and breath sounds normal. No respiratory distress. She has no wheezes. She has no rales.   Abdominal: Soft. Normal appearance. She exhibits no distension.   Musculoskeletal: Normal range of motion. She exhibits no edema.   Neurological: She is alert and oriented to person, place, and time.   Skin: Skin is warm. No rash noted. No erythema.   Psychiatric: She has a normal mood and affect. Her speech is normal and behavior is normal. Judgment and thought content normal.   Vitals reviewed.    Results Review:   I reviewed the patient's new clinical results.    Lab Results (last 72 hours)     Procedure Component Value Units Date/Time    T4, Free [[id number]]  (Abnormal) Collected:  03/11/20 0518    Specimen:  Blood Updated:  03/11/20 0807     Free T4 0.80 ng/dL     Narrative:       Results may be falsely increased if patient taking Biotin.      TSH [[id number]]  (Abnormal) Collected: "  03/11/20 0518    Specimen:  Blood Updated:  03/11/20 0807     TSH 6.530 uIU/mL     Troponin [[id number]]  (Normal) Collected:  03/11/20 0518    Specimen:  Blood Updated:  03/11/20 0555     Troponin T <0.010 ng/mL     Narrative:       Troponin T Reference Range:  <= 0.03 ng/mL-   Negative for AMI  >0.03 ng/mL-     Abnormal for myocardial necrosis.  Clinicians would have to utilize clinical acumen, EKG, Troponin and serial changes to determine if it is an Acute Myocardial Infarction or myocardial injury due to an underlying chronic condition.       Results may be falsely decreased if patient taking Biotin.      Troponin [[id number]]  (Normal) Collected:  03/11/20 0001    Specimen:  Blood Updated:  03/11/20 0026     Troponin T <0.010 ng/mL     Narrative:       Troponin T Reference Range:  <= 0.03 ng/mL-   Negative for AMI  >0.03 ng/mL-     Abnormal for myocardial necrosis.  Clinicians would have to utilize clinical acumen, EKG, Troponin and serial changes to determine if it is an Acute Myocardial Infarction or myocardial injury due to an underlying chronic condition.       Results may be falsely decreased if patient taking Biotin.      Troponin [[id number]]  (Normal) Collected:  03/10/20 1906    Specimen:  Blood Updated:  03/10/20 1930     Troponin T <0.010 ng/mL     Narrative:       Troponin T Reference Range:  <= 0.03 ng/mL-   Negative for AMI  >0.03 ng/mL-     Abnormal for myocardial necrosis.  Clinicians would have to utilize clinical acumen, EKG, Troponin and serial changes to determine if it is an Acute Myocardial Infarction or myocardial injury due to an underlying chronic condition.       Results may be falsely decreased if patient taking Biotin.      D-dimer, Quantitative [[id number]]  (Normal) Collected:  03/10/20 1609    Specimen:  Blood Updated:  03/10/20 1800     D-Dimer, Quantitative 0.29 mg/L (FEU)     Narrative:       Reference Range is 0-0.50 mg/L FEU. However, results <0.50 mg/L FEU tends to rule out DVT  or PE. Results >0.50 mg/L FEU are not useful in predicting absence or presence of DVT or PE.      Aragon Draw [[id number]] Collected:  03/10/20 1609    Specimen:  Blood Updated:  03/10/20 1715    Narrative:       The following orders were created for panel order Aragon Draw.  Procedure                               Abnormality         Status                     ---------                               -----------         ------                     Light Blue Top[[id number]]                                   Final result               Green Top (Gel)[[id number]]                                  Final result               Lavender Top[[id number]]                                     Final result               Red Top[[id number]]                                          Final result                 Please view results for these tests on the individual orders.    Light Blue Top [[id number]] Collected:  03/10/20 1609    Specimen:  Blood Updated:  03/10/20 1715     Extra Tube hold for add-on     Comment: Auto resulted       Red Top [[id number]] Collected:  03/10/20 1609    Specimen:  Blood Updated:  03/10/20 1715     Extra Tube Hold for add-ons.     Comment: Auto resulted.       Green Top (Gel) [[id number]] Collected:  03/10/20 1609    Specimen:  Blood Updated:  03/10/20 1715     Extra Tube Hold for add-ons.     Comment: Auto resulted.       Lavender Top [[id number]] Collected:  03/10/20 1609    Specimen:  Blood Updated:  03/10/20 1715     Extra Tube hold for add-on     Comment: Auto resulted       Comprehensive Metabolic Panel [[id number]]  (Abnormal) Collected:  03/10/20 1609    Specimen:  Blood Updated:  03/10/20 1639     Glucose 100 mg/dL      BUN 14 mg/dL      Creatinine 0.68 mg/dL      Sodium 137 mmol/L      Potassium 4.2 mmol/L      Chloride 101 mmol/L      CO2 26.0 mmol/L      Calcium 9.0 mg/dL      Total Protein 7.2 g/dL      Albumin 4.30 g/dL      ALT (SGPT) 10 U/L      AST (SGOT) 13 U/L      Alkaline Phosphatase 94 U/L       Total Bilirubin <0.2 mg/dL      eGFR Non African Amer 95 mL/min/1.73      Globulin 2.9 gm/dL      A/G Ratio 1.5 g/dL      BUN/Creatinine Ratio 20.6     Anion Gap 10.0 mmol/L     Narrative:       GFR Normal >60  Chronic Kidney Disease <60  Kidney Failure <15      Troponin [[id number]]  (Normal) Collected:  03/10/20 1609    Specimen:  Blood Updated:  03/10/20 1639     Troponin T <0.010 ng/mL     Narrative:       Troponin T Reference Range:  <= 0.03 ng/mL-   Negative for AMI  >0.03 ng/mL-     Abnormal for myocardial necrosis.  Clinicians would have to utilize clinical acumen, EKG, Troponin and serial changes to determine if it is an Acute Myocardial Infarction or myocardial injury due to an underlying chronic condition.       Results may be falsely decreased if patient taking Biotin.      CBC & Differential [[id number]] Collected:  03/10/20 1609    Specimen:  Blood Updated:  03/10/20 1619    Narrative:       The following orders were created for panel order CBC & Differential.  Procedure                               Abnormality         Status                     ---------                               -----------         ------                     CBC Auto Differential[[id number]]        Abnormal            Final result                 Please view results for these tests on the individual orders.    CBC Auto Differential [[id number]]  (Abnormal) Collected:  03/10/20 1609    Specimen:  Blood Updated:  03/10/20 1619     WBC 8.63 10*3/mm3      RBC 4.68 10*6/mm3      Hemoglobin 12.7 g/dL      Hematocrit 38.3 %      MCV 81.8 fL      MCH 27.1 pg      MCHC 33.2 g/dL      RDW 14.3 %      RDW-SD 42.1 fl      MPV 9.2 fL      Platelets 322 10*3/mm3      Neutrophil % 61.3 %      Lymphocyte % 26.2 %      Monocyte % 7.3 %      Eosinophil % 3.7 %      Basophil % 0.8 %      Immature Grans % 0.7 %      Neutrophils, Absolute 5.29 10*3/mm3      Lymphocytes, Absolute 2.26 10*3/mm3      Monocytes, Absolute 0.63 10*3/mm3      Eosinophils,  Absolute 0.32 10*3/mm3      Basophils, Absolute 0.07 10*3/mm3      Immature Grans, Absolute 0.06 10*3/mm3      nRBC 0.0 /100 WBC         No results found for: ECHOEFEST    Imaging Results (Last 72 Hours)     Procedure Component Value Units Date/Time    XR Chest 1 View [[id number]] Collected:  03/10/20 1839     Updated:  03/10/20 1842    Narrative:       EXAMINATION: XR CHEST 1 VW-. 3/10/2020 6:39 PM CDT     CHEST, ONE VIEW:     HISTORY: Chest pain     COMPARISON: None     A single frontal chest radiograph was obtained.     FINDINGS:     The lungs are clear without infiltrates.     The heart is normal in size, pulmonary circulation appropriate, without  heart failure.     The bony structures are intact without acute osseous abnormality.                                     Impression:       1. No acute cardiopulmonary process.     This report was finalized on 03/10/2020 18:39 by Dr. Ryne Perry MD.        Assessment/Plan       Chest pain    Plan:   -Chest pain; Troponin <0.010 x 4; EKG changed noted in ER (T inversion in V2 and V3 that resolved when patient was pain free); patient has been pain free since. Patient has been NPO. We will order a Stress ECHO today   -Continue Telemetry   -Blood pressures are well controlled; continue to monitor      Further orders per Dr Snow    Thank you for asking us to follow this patient with you.     Please note this cardiology consultation note is the result of a face to face consultation with the patient, in addition to reviewing medical records at length by myself, HU Moss.      HU Moss  03/11/20  10:17

## 2020-03-11 NOTE — PAYOR COMM NOTE
"3/11/20 [facility] [phone]  [phone]    PATIENT ADMITTED ON 3/10/20 TO INPATIENT. ER ADMISSION.  FAXING CLINICAL FOR INPATIENT REVIEW.          Ammy Barnett (44 y.o. Female)     Date of Birth Social Security Number Address Home Phone MRN    1975  [address] [phone] [id number]    Yarsani Marital Status          Christian        Admission Date Admission Type Admitting Provider Attending Provider Department, Room/Bed    3/10/20 Emergency Jacob Simms MD [place], Thomas Waldon, MD [facility] 4B, 432/1    Discharge Date Discharge Disposition Discharge Destination                       Attending Provider:  Jacob Simms MD    Allergies:  Codeine    Isolation:  None   Infection:  None   Code Status:  CPR    Ht:  162.6 cm (64.02\")   Wt:  87 kg (191 lb 12.8 oz)    Admission Cmt:  None   Principal Problem:  None                Active Insurance as of 3/10/2020     Primary Coverage     Payor Plan Insurance Group Employer/Plan Group    [employer] BLUE [employer]OSS [employer] BLUE [employer]OSS BLUE [employer] PPO [id number]     Payor Plan Address Payor Plan Phone Number Payor Plan Fax Number Effective Dates    [address] [phone]  1/1/2020 - None Entered    [place] [address]       Subscriber Name Subscriber Birth Date Member ID       AMMY BARNETT 1975 [id number]                 Emergency Contacts      (Rel.) Home Phone Work Phone Mobile Phone    Veronica Louise (Mother) [phone] -- --        Juanita Jang MD   Physician   Emergency Medicine   ED Provider Notes   Signed   Date of Service:  03/10/20 1804   Creation Time:  03/10/20 1804            Signed        Expand All Collapse All      Show:Clear all  [x]Manual[x]Template[]Copied    Added by:  [x]Juanita Jang MD    []Kaiden for details     Subjective      Patient is a 44-year-old female who presents to the ER with chest pain.  Patient states around 2:30 " PM she had the sudden onset of nausea, diaphoresis, headache, shortness of breath and jaw pain.  Patient states she then developed midsternal chest pain that she describes as burning in nature and radiated down to her epigastric abdominal region.  Patient states her pain was constant until around 5 PM and then resolved and she is currently pain-free.  Patient has never had pain like this previously.  This was in no relation to eating.  She denies any fever, abdominal pain, vomiting, diarrhea, urinary changes, neurologic changes, cough, leg pain or swelling.  Patient was seen in urgent treatment center and sent here for evaluation.              Review of Systems   Constitutional: Positive for diaphoresis.   Eyes: Negative.    Respiratory: Positive for shortness of breath.    Cardiovascular: Positive for chest pain.   Gastrointestinal: Positive for abdominal pain and nausea.   Endocrine: Negative.    Genitourinary: Negative.    Musculoskeletal: Negative.    Skin: Negative.    Allergic/Immunologic: Negative.    Neurological: Positive for headaches.   Hematological: Negative.    Psychiatric/Behavioral: Negative.    All other systems reviewed and are negative.     Medical History        Past Medical History:   Diagnosis Date   • Anxiety     • Asthma     • Bunion, right     • Cystitis     • Disease of thyroid gland     • Hypercholesteremia     • Plantar fascia syndrome     • Seasonal allergies     • Urge incontinence           Objective      Physical Exam   Constitutional: She is oriented to person, place, and time. She appears well-developed and well-nourished.   HENT:   Head: Normocephalic and atraumatic.   Eyes: Pupils are equal, round, and reactive to light. Conjunctivae are normal.   Neck: Normal range of motion.   Cardiovascular: Normal rate, regular rhythm and normal heart sounds.   Pulmonary/Chest: Effort normal and breath sounds normal.   Abdominal: Soft. There is no tenderness.   Musculoskeletal: Normal range of  motion. She exhibits no edema or deformity.   Neurological: She is alert and oriented to person, place, and time. She has normal strength.   Skin: Skin is warm.   Psychiatric: She has a normal mood and affect. Her behavior is normal.   Nursing note and vitals reviewed.        Procedures      ED Course      ECG1: Normal sinus rhythm with a rate of 65, T wave inversion in V2 and 3  ECG2: Normal sinus rhythm with a rate of 64, no acute ischemia or infarction              Lab Results (last 24 hours)      Procedure     Chest 1 View   Final Result   1. No acute cardiopulmonary process.       This report was finalized on 03/10/2020 18:39 by Dr. Ryne Perry MD.          Labs were normal.  Chest x-ray was normal.  First EKG was abnormal and this occurred when the patient was having pain.  Second EKG normalized when pain normalized.  This is concerning for unstable angina.  Patient was then admitted to Dr. Griggs service for further evaluation.                                    [facility]     Final diagnoses:   Chest pain, unspecified type   Abnormal ECG   Unstable angina (CMS/HCC)               Juanita Jang MD  03/10/20 2058                 Edy Calle APRN   Nurse Practitioner   Cardiology   Consults   Cosign Needed   Date of Service:  03/11/20 1006   Creation Time:  03/11/20 1006            Cosign Needed        Expand All Collapse All      Show:Clear all  [x]Manual[x]Template[x]Copied    Added by:  [x]Edy Calle APRN    []Kaiden for details  [facility] CONSULT NOTE     Referring Provider: Jacob Simms MD     Reason for Consultation: Chest pain     Chief complaint:       Chief Complaint   Patient presents with   • Chest Pain            Subjective    .      History of present illness:  Marjorie Barnett is a 44 y.o. female with history of anxiety, and thyroid disease who presented to the urgent care yesterday complaining of chest pain. She was advised to come to [facility] ER  for further evaluation of chest pain. Patient reported that she had a sudden onset of nausea, shortness of breath, jaw pain, diaphoresis and headache that started about 2:30 yesterday afternoon. She reports that she then started with midsternal chest pain that radiated down to her epigastric area. Patient states that it was no relation to eating. She rates pain was an 8/10 and describes it as a burning. Patient states that she felt like she needed to lay down while pain was present. Patient reports that pain lasted constantly until about 5:00 pm and then resolved on its own. She reports that she had some shortness of breath in ER intermittently; she states that she actually prefers to lay down to relieve the shortness of breath some. She reports that she has a mild burning sensation this am that she rates a 2/10 but denies any pain.  Patients first EKG was taken in the ER while patient was still having pain and had t-wave inversion noted in leads V2 and V3. The second EKG was taken when patient was pain free and these were resolved. CXR in ER was normal. CBC and CMP were unremarkable. Patient had troponin <0.010 x 4 now. Patient was given Ativan last night and states that she slept all night. She denies any previous cardiac history or family cardiac history. She denies smoking. 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 Patient denies any heart racing or palpitations. She denies any decrease in stamina or fatigue. She denies any leg swelling, orthopnea or PND.      History  Medical History        Past Medical History:   Diagnosis Date   • Anxiety     • Asthma     • Bunion, right     • Cystitis     • Disease of thyroid gland     • Hypercholesteremia     • Plantar fascia syndrome     • Seasonal allergies     • Urge incontinence        ,   Surgical History         Past Surgical History:   Procedure Laterality Date   •  SECTION            x2   • CHOLECYSTECTOMY          ,         Family History                 History  Medical History        Past Medical History:   Diagnosis Date   • Anxiety     • Asthma     • Bunion, right     • Cystitis     • Disease of thyroid gland     • Hypercholesteremia     • Plantar fascia syndrome     • Seasonal allergies     • Urge incontinence        ,   Allergies:  Codeine     Review of Systems  Review of Systems   Constitution: Negative for decreased appetite, diaphoresis, malaise/fatigue and night sweats.   HENT: Negative for congestion and nosebleeds.    Eyes: Negative for visual disturbance.   Cardiovascular: Negative for chest pain, dyspnea on exertion, irregular heartbeat, leg swelling, near-syncope, orthopnea, palpitations, paroxysmal nocturnal dyspnea and syncope.   Respiratory: Negative for cough, shortness of breath and wheezing.    Hematologic/Lymphatic: Does not bruise/bleed easily.   Musculoskeletal: Negative for arthritis and joint pain.   Gastrointestinal: Negative for abdominal pain and change in bowel habit.   Genitourinary: Negative for hematuria and urgency.   Neurological: Negative for dizziness, headaches, light-headedness, loss of balance and weakness.   Psychiatric/Behavioral: Negative for altered mental status. The patient is not nervous/anxious.    All other systems reviewed and are negative.      Telemetry:   Sinus tachycardia at rate of 105 this am. She has been running sinus rhythm rate 70-80's.      Physical Exam   Constitutional: She is oriented to person, place, and time. She appears well-developed and well-nourished. No distress.   HENT:   Head: Normocephalic and atraumatic.   Nose: Nose normal.   Eyes: Pupils are equal, round, and reactive to light.   Neck: Normal range of motion. Neck supple. Carotid bruit is not present.   Cardiovascular: Normal rate, regular rhythm and normal heart sounds.   No murmur heard.  Pulmonary/Chest: Effort normal and breath sounds normal. No respiratory  distress. She has no wheezes. She has no rales.   Abdominal: Soft. Normal appearance. She exhibits no distension.   Musculoskeletal: Normal range of motion. She exhibits no edema.   Neurological: She is alert and oriented to person, place, and time.   Skin: Skin is warm. No rash noted. No erythema.   Psychiatric: She has a normal mood and affect. Her speech is normal and behavior is normal. Judgment and thought content normal.   Vitals reviewed.  Lab Results (last 72 hours)      Procedure Component Value Units Date/Time     T4, Free [[id number]]  (Abnormal) Collected:  03/11/20 0518     Specimen:  Blood Updated:  03/11/20 0807       Free T4 0.80 ng/dL       Narrative:        Results may be falsely increased if patient taking Biotin.        TSH [[id number]]  (Abnormal) Collected:  03/11/20 0518     Specimen:  Blood Updated:  03/11/20 0807       TSH 6.530 uIU/mL       Troponin [[id number]]  (Normal      Comprehensive Metabolic Panel [[id number]]  (Abnormal) Collected:  03/10/20 1609      Specimen:  Blood Updated:  03/10/20 1639       Glucose 100 mg/dL         BUN 14 mg/dL         Creatinine 0.68 mg/dL         Sodium 137 mmol/L         Potassium 4.2 mmol/L         Chloride 101 mmol/L         CO2 26.0 mmol/L         Calcium 9.0 mg/dL         Total Protein 7.2 g/dL         Albumin 4.30 g/dL         ALT (SGPT) 10 U/L         AST (SGOT) 13 U/L         Alkaline Phosphatase 94 U/L         Total Bilirubin <0.2 mg/dL         eGFR Non African Amer 95 mL/min/1.73         Globulin 2.9 gm/dL         A/G Ratio 1.5 g/dL         BUN/Creatinine Ratio 20.6       Anion Gap 10.0 mmol/L       Narrative:        Results (Last 72 Hours)      Procedure Component Value Units Date/Time     XR Chest 1 View [[id number]] Collected:  03/10/20 1839       Updated:  03/10/20 1842     Narrative:        EXAMINATION: XR CHEST 1 VW-. 3/10/2020 6:39 PM CDT     CHEST, ONE VIEW:     HISTORY: Chest pain     COMPARISON: None     A single frontal chest radiograph  was obtained.     FINDINGS:     The lungs are clear without infiltrates.     The heart is normal in size, pulmonary circulation appropriate, without  heart failure.     The bony structures are intact without acute osseous abnormality.                                      Impression:        1. No acute cardiopulmonary process.     This report was finalized on 03/10/2020 18:39 by Dr. Ryne Perry MD.      Assessment/Plan           Chest pain     Plan:   -Chest pain; Troponin <0.010 x 4; EKG changed noted in ER (T inversion in V2 and V3 that resolved when patient was pain free); patient has been pain free since. Patient has been NPO. We will order a Stress ECHO today   -Continue Telemetry   -Blood pressures are well controlled; continue to monitor        Further orders per Dr Snow     Thank you for asking us to follow this patient with you.      Please note this cardiology consultation note is the result of a face to face consultation with the patient, in addition to reviewing medical records at length by myself, HU Moss.        HU Moss  03/11/20  10:17                                                   Current Facility-Administered Medications   Medication Dose Route Frequency Provider Last Rate Last Dose   • cetirizine (zyrTEC) tablet 10 mg  10 mg Oral Daily Jacob Simms MD   10 mg at 03/11/20 0948   • enoxaparin (LOVENOX) syringe 40 mg  40 mg Subcutaneous Q24H Jacob Simms MD   40 mg at 03/11/20 0948   • levothyroxine (SYNTHROID, LEVOTHROID) tablet 100 mcg  100 mcg Oral Q AM Jacob Simms MD   100 mcg at 03/11/20 0948   • LORazepam (ATIVAN) tablet 1 mg  1 mg Oral Nightly PRN Jacob Simms MD   1 mg at 03/11/20 0013   • nitroglycerin (NITROSTAT) SL tablet 0.4 mg  0.4 mg Sublingual Q5 Min PRN Jacob Simms MD       • oxybutynin XL (DITROPAN-XL) 24 hr tablet 10 mg  10 mg Oral Daily Jacob Simms MD   10 mg at 03/11/20 0948   • Pharmacy to  Dose enoxaparin (LOVENOX)   Does not apply Continuous PRN Jacob Simms MD       • sertraline (ZOLOFT) tablet 100 mg  100 mg Oral Nightly Jacob Simms MD       • sodium chloride 0.9 % flush 10 mL  10 mL Intravenous PRN Jacob Simms MD

## 2020-03-11 NOTE — PLAN OF CARE
Problem: Patient Care Overview  Goal: Plan of Care Review  Outcome: Ongoing (interventions implemented as appropriate)  Flowsheets (Taken 3/11/2020 4658)  Progress: improving  Plan of Care Reviewed With: patient  Outcome Summary: VSS. no c/o pain, NPO since 00:01 sinus 67-82

## 2020-03-11 NOTE — CONSULTS
[facility] HEART GROUP CONSULT NOTE    Referring Provider: Jacob Simms MD    Reason for Consultation: Chest pain    Chief complaint:   Chief Complaint   Patient presents with   • Chest Pain       Subjective .     History of present illness:  Marjorie Barnett is a 44 y.o. female with history of anxiety, and thyroid disease who presented to the urgent care yesterday complaining of chest pain. She was advised to come to [facility] ER for further evaluation of chest pain. Patient reported that she had a sudden onset of nausea, shortness of breath, jaw pain, diaphoresis and headache that started about 2:30 yesterday afternoon. She reports that she then started with midsternal chest pain that radiated down to her epigastric area. Patient states that it was no relation to eating. She rates pain was an 8/10 and describes it as a burning. Patient states that she felt like she needed to lay down while pain was present. Patient reports that pain lasted constantly until about 5:00 pm and then resolved on its own. She reports that she had some shortness of breath in ER intermittently; she states that she actually prefers to lay down to relieve the shortness of breath some. She reports that she has a mild burning sensation this am that she rates a 2/10 but denies any pain.  Patients first EKG was taken in the ER while patient was still having pain and had t-wave inversion noted in leads V2 and V3. The second EKG was taken when patient was pain free and these were resolved. CXR in ER was normal. CBC and CMP were unremarkable. Patient had troponin <0.010 x 4 now. Patient was given Ativan last night and states that she slept all night. She denies any previous cardiac history or family cardiac history. She denies smoking. She reports that prior to pain     History  Past Medical History:   Diagnosis Date   • Anxiety    • Asthma    • Bunion, right    • Cystitis    • Disease of thyroid gland    •  Hypercholesteremia    • Plantar fascia syndrome    • Seasonal allergies    • Urge incontinence    ,   Past Surgical History:   Procedure Laterality Date   •  SECTION      x2   • CHOLECYSTECTOMY     ,   Family History   Problem Relation Age of Onset   • Diabetes Mother    • Cancer Mother         kidney   • Kidney disease Mother    • Hyperlipidemia Mother    • Cancer Father         skin   • Hyperlipidemia Father    • Breast cancer Neg Hx    • Ovarian cancer Neg Hx    • Colon cancer Neg Hx    ,   Social History     Tobacco Use   • Smoking status: Never Smoker   • Smokeless tobacco: Never Used   Substance Use Topics   • Alcohol use: Yes     Comment: occisonally   • Drug use: No       Medications    Prior to Admission medications    Medication Sig Start Date End Date Taking? Authorizing Provider   albuterol (PROVENTIL HFA;VENTOLIN HFA) 108 (90 BASE) MCG/ACT inhaler Inhale 2 puffs Every 4 (Four) Hours As Needed for wheezing. 3/3/17   Jacob Simms MD   cetirizine (zyrTEC) 10 MG tablet Take 10 mg by mouth Daily.    Provider, MD Percy   levothyroxine (SYNTHROID, LEVOTHROID) 100 MCG tablet Take 1 tablet by mouth daily 10/4/19   Jacob Simms MD   oxybutynin XL (DITROPAN-XL) 10 MG 24 hr tablet Take 1 tablet by mouth Daily. 10/28/19   Melanie Cuellar APRN   sertraline (ZOLOFT) 100 MG tablet Take 2 tablets by mouth daily 3/5/20   Jacob Simms MD       Current Facility-Administered Medications   Medication Dose Route Frequency Provider Last Rate Last Dose   • cetirizine (zyrTEC) tablet 10 mg  10 mg Oral Daily Jacob Simms MD       • enoxaparin (LOVENOX) syringe 40 mg  40 mg Subcutaneous Q24H Jacob Simms MD       • levothyroxine (SYNTHROID, LEVOTHROID) tablet 100 mcg  100 mcg Oral Q AM Jacob Simms MD       • LORazepam (ATIVAN) tablet 1 mg  1 mg Oral Nightly PRN Jacob Simms MD   1 mg at 20 0013   • nitroglycerin (NITROSTAT) SL  "tablet 0.4 mg  0.4 mg Sublingual Q5 Min PRN Jacob Simms MD       • oxybutynin XL (DITROPAN-XL) 24 hr tablet 10 mg  10 mg Oral Daily Jacob Simms MD       • Pharmacy to Dose enoxaparin (LOVENOX)   Does not apply Continuous PRN Jacob Simms MD       • sertraline (ZOLOFT) tablet 100 mg  100 mg Oral Nightly Jacob Simms MD       • sodium chloride 0.9 % flush 10 mL  10 mL Intravenous PRN Jacob Simms MD         Allergies:  Codeine    Review of Systems  Review of Systems   Constitution: Negative for decreased appetite, diaphoresis, malaise/fatigue and night sweats.   HENT: Negative for congestion and nosebleeds.    Eyes: Negative for visual disturbance.   Cardiovascular: Negative for chest pain, dyspnea on exertion, irregular heartbeat, leg swelling, near-syncope, orthopnea, palpitations, paroxysmal nocturnal dyspnea and syncope.   Respiratory: Negative for cough, shortness of breath and wheezing.    Hematologic/Lymphatic: Does not bruise/bleed easily.   Musculoskeletal: Negative for arthritis and joint pain.   Gastrointestinal: Negative for abdominal pain and change in bowel habit.   Genitourinary: Negative for hematuria and urgency.   Neurological: Negative for dizziness, headaches, light-headedness, loss of balance and weakness.   Psychiatric/Behavioral: Negative for altered mental status. The patient is not nervous/anxious.    All other systems reviewed and are negative.      Objective     Physical Exam:  Patient Vitals for the past 24 hrs:   BP Temp Temp src Pulse Resp SpO2 Height Weight   03/11/20 0700 102/56 98.1 °F (36.7 °C) Oral 72 16 96 % -- --   03/11/20 0048 132/79 97.8 °F (36.6 °C) Oral 73 16 100 % 162.6 cm (64.02\") 87 kg (191 lb 12.8 oz)   03/10/20 1900 133/96 -- -- 74 -- 99 % -- --   03/10/20 1830 132/84 -- -- 68 -- 99 % -- --   03/10/20 1815 125/84 -- -- 84 -- 99 % -- --   03/10/20 1800 122/85 -- -- 66 -- 99 % -- --   03/10/20 1745 126/83 -- -- 68 -- 100 % " "-- --   03/10/20 1735 125/82 -- -- 62 -- 99 % -- --   03/10/20 1725 138/89 -- -- 66 -- 98 % -- --   03/10/20 1614 135/81 97.4 °F (36.3 °C) -- 74 16 100 % 162.6 cm (64\") 83.9 kg (185 lb)     Telemetry:   Sinus tachycardia at rate of 105 this am. She has been running sinus rhythm rate 70-80's.     Physical Exam   Constitutional: She is oriented to person, place, and time. She appears well-developed and well-nourished. No distress.   HENT:   Head: Normocephalic and atraumatic.   Nose: Nose normal.   Eyes: Pupils are equal, round, and reactive to light.   Neck: Normal range of motion. Neck supple. Carotid bruit is not present.   Cardiovascular: Normal rate, regular rhythm and normal heart sounds.   No murmur heard.  Pulmonary/Chest: Effort normal and breath sounds normal. No respiratory distress. She has no wheezes. She has no rales.   Abdominal: Soft. Normal appearance. She exhibits no distension.   Musculoskeletal: Normal range of motion. She exhibits no edema.   Neurological: She is alert and oriented to person, place, and time.   Skin: Skin is warm. No rash noted. No erythema.   Psychiatric: She has a normal mood and affect. Her speech is normal and behavior is normal. Judgment and thought content normal.   Vitals reviewed.    Results Review:   I reviewed the patient's new clinical results.    Lab Results (last 72 hours)     Procedure Component Value Units Date/Time    T4, Free [[id number]]  (Abnormal) Collected:  03/11/20 0518    Specimen:  Blood Updated:  03/11/20 0807     Free T4 0.80 ng/dL     Narrative:       Results may be falsely increased if patient taking Biotin.      TSH [[id number]]  (Abnormal) Collected:  03/11/20 0518    Specimen:  Blood Updated:  03/11/20 0807     TSH 6.530 uIU/mL     Troponin [[id number]]  (Normal) Collected:  03/11/20 0518    Specimen:  Blood Updated:  03/11/20 0555     Troponin T <0.010 ng/mL     Narrative:       Troponin T Reference Range:  <= 0.03 ng/mL-   Negative for AMI  >0.03 " ng/mL-     Abnormal for myocardial necrosis.  Clinicians would have to utilize clinical acumen, EKG, Troponin and serial changes to determine if it is an Acute Myocardial Infarction or myocardial injury due to an underlying chronic condition.       Results may be falsely decreased if patient taking Biotin.      Troponin [[id number]]  (Normal) Collected:  03/11/20 0001    Specimen:  Blood Updated:  03/11/20 0026     Troponin T <0.010 ng/mL     Narrative:       Troponin T Reference Range:  <= 0.03 ng/mL-   Negative for AMI  >0.03 ng/mL-     Abnormal for myocardial necrosis.  Clinicians would have to utilize clinical acumen, EKG, Troponin and serial changes to determine if it is an Acute Myocardial Infarction or myocardial injury due to an underlying chronic condition.       Results may be falsely decreased if patient taking Biotin.      Troponin [[id number]]  (Normal) Collected:  03/10/20 1906    Specimen:  Blood Updated:  03/10/20 1930     Troponin T <0.010 ng/mL     Narrative:       Troponin T Reference Range:  <= 0.03 ng/mL-   Negative for AMI  >0.03 ng/mL-     Abnormal for myocardial necrosis.  Clinicians would have to utilize clinical acumen, EKG, Troponin and serial changes to determine if it is an Acute Myocardial Infarction or myocardial injury due to an underlying chronic condition.       Results may be falsely decreased if patient taking Biotin.      D-dimer, Quantitative [[id number]]  (Normal) Collected:  03/10/20 1609    Specimen:  Blood Updated:  03/10/20 1800     D-Dimer, Quantitative 0.29 mg/L (FEU)     Narrative:       Reference Range is 0-0.50 mg/L FEU. However, results <0.50 mg/L FEU tends to rule out DVT or PE. Results >0.50 mg/L FEU are not useful in predicting absence or presence of DVT or PE.      [place] Draw [[id number]] Collected:  03/10/20 1609    Specimen:  Blood Updated:  03/10/20 1715    Narrative:       The following orders were created for panel order [place] Draw.  Procedure                                Abnormality         Status                     ---------                               -----------         ------                     Light Blue Top[[id number]]                                   Final result               Green Top (Gel)[[id number]]                                  Final result               Lavender Top[[id number]]                                     Final result               Red Top[[id number]]                                          Final result                 Please view results for these tests on the individual orders.    Light Blue Top [[id number]] Collected:  03/10/20 1609    Specimen:  Blood Updated:  03/10/20 1715     Extra Tube hold for add-on     Comment: Auto resulted       Red Top [[id number]] Collected:  03/10/20 1609    Specimen:  Blood Updated:  03/10/20 1715     Extra Tube Hold for add-ons.     Comment: Auto resulted.       Green Top (Gel) [[id number]] Collected:  03/10/20 1609    Specimen:  Blood Updated:  03/10/20 1715     Extra Tube Hold for add-ons.     Comment: Auto resulted.       Lavender Top [[id number]] Collected:  03/10/20 1609    Specimen:  Blood Updated:  03/10/20 1715     Extra Tube hold for add-on     Comment: Auto resulted       Comprehensive Metabolic Panel [[id number]]  (Abnormal) Collected:  03/10/20 1609    Specimen:  Blood Updated:  03/10/20 1639     Glucose 100 mg/dL      BUN 14 mg/dL      Creatinine 0.68 mg/dL      Sodium 137 mmol/L      Potassium 4.2 mmol/L      Chloride 101 mmol/L      CO2 26.0 mmol/L      Calcium 9.0 mg/dL      Total Protein 7.2 g/dL      Albumin 4.30 g/dL      ALT (SGPT) 10 U/L      AST (SGOT) 13 U/L      Alkaline Phosphatase 94 U/L      Total Bilirubin <0.2 mg/dL      eGFR Non African Amer 95 mL/min/1.73      Globulin 2.9 gm/dL      A/G Ratio 1.5 g/dL      BUN/Creatinine Ratio 20.6     Anion Gap 10.0 mmol/L     Narrative:       GFR Normal >60  Chronic Kidney Disease <60  Kidney Failure <15      Troponin [[id number]]  (Normal)  Collected:  03/10/20 1609    Specimen:  Blood Updated:  03/10/20 1639     Troponin T <0.010 ng/mL     Narrative:       Troponin T Reference Range:  <= 0.03 ng/mL-   Negative for AMI  >0.03 ng/mL-     Abnormal for myocardial necrosis.  Clinicians would have to utilize clinical acumen, EKG, Troponin and serial changes to determine if it is an Acute Myocardial Infarction or myocardial injury due to an underlying chronic condition.       Results may be falsely decreased if patient taking Biotin.      CBC & Differential [[id number]] Collected:  03/10/20 1609    Specimen:  Blood Updated:  03/10/20 1619    Narrative:       The following orders were created for panel order CBC & Differential.  Procedure                               Abnormality         Status                     ---------                               -----------         ------                     CBC Auto Differential[[id number]]        Abnormal            Final result                 Please view results for these tests on the individual orders.    CBC Auto Differential [[id number]]  (Abnormal) Collected:  03/10/20 1609    Specimen:  Blood Updated:  03/10/20 1619     WBC 8.63 10*3/mm3      RBC 4.68 10*6/mm3      Hemoglobin 12.7 g/dL      Hematocrit 38.3 %      MCV 81.8 fL      MCH 27.1 pg      MCHC 33.2 g/dL      RDW 14.3 %      RDW-SD 42.1 fl      MPV 9.2 fL      Platelets 322 10*3/mm3      Neutrophil % 61.3 %      Lymphocyte % 26.2 %      Monocyte % 7.3 %      Eosinophil % 3.7 %      Basophil % 0.8 %      Immature Grans % 0.7 %      Neutrophils, Absolute 5.29 10*3/mm3      Lymphocytes, Absolute 2.26 10*3/mm3      Monocytes, Absolute 0.63 10*3/mm3      Eosinophils, Absolute 0.32 10*3/mm3      Basophils, Absolute 0.07 10*3/mm3      Immature Grans, Absolute 0.06 10*3/mm3      nRBC 0.0 /100 WBC         No results found for: ECHOEFEST    Imaging Results (Last 72 Hours)     Procedure Component Value Units Date/Time    XR Chest 1 View [[id number]] Collected:   03/10/20 1839     Updated:  03/10/20 1842    Narrative:       EXAMINATION: XR CHEST 1 VW-. 3/10/2020 6:39 PM CDT     CHEST, ONE VIEW:     HISTORY: Chest pain     COMPARISON: None     A single frontal chest radiograph was obtained.     FINDINGS:     The lungs are clear without infiltrates.     The heart is normal in size, pulmonary circulation appropriate, without  heart failure.     The bony structures are intact without acute osseous abnormality.                                     Impression:       1. No acute cardiopulmonary process.     This report was finalized on 03/10/2020 18:39 by Dr. Ryne Perry MD.        Assessment/Plan       Chest pain    Plan:   -   -  -        Further orders per Dr Snow    Thank you for asking us to follow this patient with you.     Please note this cardiology consultation note is the result of a face to face consultation with the patient, in addition to reviewing medical records at length by myself, HU Moss.      HU Moss  03/11/20  09:15

## 2020-03-12 ENCOUNTER — TELEPHONE (OUTPATIENT)
Dept: FAMILY MEDICINE CLINIC | Facility: CLINIC | Age: 45
End: 2020-03-12

## 2020-03-12 NOTE — PAYOR COMM NOTE
"Dc home 3-11-20    [id number]  Ur fax   Ammy Barnett (44 y.o. Female)     Date of Birth Social Security Number Address Home Phone MRN    [id number]  [address] [facility] [address] [phone] [id number]    [employer] Marital Status          [place]        Admission Date Admission Type Admitting Provider Attending Provider Department, Room/Bed    3/10/20 Emergency Jacob Simms MD  [facility] 4B, 432/1    Discharge Date Discharge Disposition Discharge Destination        3/11/2020 Home or Self Care              Attending Provider:  (none)   Allergies:  Codeine    Isolation:  None   Infection:  None   Code Status:  Prior    Ht:  162.6 cm (64.02\")   Wt:  87 kg (191 lb 12.8 oz)    Admission Cmt:  None   Principal Problem:  None                Active Insurance as of 3/10/2020     Primary Coverage     Payor Plan Insurance Group Employer/Plan Group    [employer] BLUE CROSS AN[employer]M BLUE CROSS BLUE SHIELD PPO [id number]     Payor Plan Address Payor Plan Phone Number Payor Plan Fax Number Effective Dates    [address] [phone]  1/1/2020 - None Entered    [facility] [id number]       Subscriber Name Subscriber Birth Date Member ID       AMMY BARNETT 1975 [id number]                 Emergency Contacts      (Rel.) Home Phone Work Phone Mobile Phone    Veronica Louise (Mother) [phone] -- --            Physician Progress Notes (last 72 hours) (Notes from 03/09/20 0951 through 03/12/20 0951)    No notes of this type exist for this encounter.            Consult Notes (last 72 hours) (Notes from 03/09/20 0951 through 03/12/20 0951)      Edy Calle APRN at 03/11/20 1006     Attestation signed by Louie Snow MD at 03/11/20 5694    I have personally reviewed EKG and telemetry which reveals SR    Heart- RRR  Lungs- Clear    New problems for this admission  Atypical CP with normal DSE. Recommend GI workup. will see prn. Thanks    Medical Decision Making:  " Moderate Complexity    I have seen and examined the patient and agree with the findings below                      [facility] HEART GROUP CONSULT NOTE    Referring Provider: Jacob Simms MD    Reason for Consultation: Chest pain    Chief complaint:   Chief Complaint   Patient presents with   • Chest Pain       Subjective .     History of present illness:  Marjorie Barnett is a 44 y.o. female with history of anxiety, and thyroid disease who presented to the urgent care yesterday complaining of chest pain. She was advised to come to [facility] ER for further evaluation of chest pain. Patient reported that she had a sudden onset of nausea, shortness of breath, jaw pain, diaphoresis and headache that started about 2:30 yesterday afternoon. She reports that she then started with midsternal chest pain that radiated down to her epigastric area. Patient states that it was no relation to eating. She rates pain was an 8/10 and describes it as a burning. Patient states that she felt like she needed to lay down while pain was present. Patient reports that pain lasted constantly until about 5:00 pm and then resolved on its own. She reports that she had some shortness of breath in ER intermittently; she states that she actually prefers to lay down to relieve the shortness of breath some. She reports that she has a mild burning sensation this am that she rates a 2/10 but denies any pain.  Patients first EKG was taken in the ER while patient was still having pain and had t-wave inversion noted in leads V2 and V3. The second EKG was taken when patient was pain free and these were resolved. CXR in ER was normal. CBC and CMP were unremarkable. Patient had troponin <0.010 x 4 now. Patient was given Ativan last night and states that she slept all night. She denies any previous cardiac history or family cardiac history. She denies smoking. 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 Patient denies any heart racing or palpitations. She denies any decrease in stamina or fatigue. She denies any leg swelling, orthopnea or PND.     History  Past Medical History:   Diagnosis Date   • Anxiety    • Asthma    • Bunion, right    • Cystitis    • Disease of thyroid gland    • Hypercholesteremia    • Plantar fascia syndrome    • Seasonal allergies    • Urge incontinence    ,   Past Surgical History:   Procedure Laterality Date   •  SECTION      x2   • CHOLECYSTECTOMY     ,   Family History   Problem Relation Age of Onset   • Diabetes Mother    • Cancer Mother         kidney   • Kidney disease Mother    • Hyperlipidemia Mother    • Cancer Father         skin   • Hyperlipidemia Father    • Breast cancer Neg Hx    • Ovarian cancer Neg Hx    • Colon cancer Neg Hx    ,   Social History     Tobacco Use   • Smoking status: Never Smoker   • Smokeless tobacco: Never Used   Substance Use Topics   • Alcohol use: Yes     Comment: occisonally   • Drug use: No       Medications    Prior to Admission medications    Medication Sig Start Date End Date Taking? Authorizing Provider   albuterol (PROVENTIL HFA;VENTOLIN HFA) 108 (90 BASE) MCG/ACT inhaler Inhale 2 puffs Every 4 (Four) Hours As Needed for wheezing. 3/3/17   Jacob Simms MD   cetirizine (zyrTEC) 10 MG tablet Take 10 mg by mouth Daily.    Provider, MD Percy   levothyroxine (SYNTHROID, LEVOTHROID) 100 MCG tablet Take 1 tablet by mouth daily 10/4/19   Jacob Simms MD   oxybutynin XL (DITROPAN-XL) 10 MG 24 hr tablet Take 1 tablet by mouth Daily. 10/28/19   Melanie Cuellar APRN   sertraline (ZOLOFT) 100 MG tablet Take 2 tablets by mouth daily 3/5/20   Jacob Simms MD       Current Facility-Administered Medications   Medication Dose Route Frequency Provider Last Rate Last Dose   • cetirizine (zyrTEC) tablet 10 mg  10 mg Oral Daily Jacob Simms MD   10 mg at  03/11/20 0948   • enoxaparin (LOVENOX) syringe 40 mg  40 mg Subcutaneous Q24H Jacob Simms MD   40 mg at 03/11/20 0948   • levothyroxine (SYNTHROID, LEVOTHROID) tablet 100 mcg  100 mcg Oral Q AM Jacob Simms MD   100 mcg at 03/11/20 0948   • LORazepam (ATIVAN) tablet 1 mg  1 mg Oral Nightly PRN Jacob Simms MD   1 mg at 03/11/20 0013   • nitroglycerin (NITROSTAT) SL tablet 0.4 mg  0.4 mg Sublingual Q5 Min PRN Jacob Simms MD       • oxybutynin XL (DITROPAN-XL) 24 hr tablet 10 mg  10 mg Oral Daily Jacob Simms MD   10 mg at 03/11/20 0948   • Pharmacy to Dose enoxaparin (LOVENOX)   Does not apply Continuous PRN Jacob Simms MD       • sertraline (ZOLOFT) tablet 100 mg  100 mg Oral Nightly Jacob Simms MD       • sodium chloride 0.9 % flush 10 mL  10 mL Intravenous PRN Jacob Simms MD         Allergies:  Codeine    Review of Systems  Review of Systems   Constitution: Negative for decreased appetite, diaphoresis, malaise/fatigue and night sweats.   HENT: Negative for congestion and nosebleeds.    Eyes: Negative for visual disturbance.   Cardiovascular: Negative for chest pain, dyspnea on exertion, irregular heartbeat, leg swelling, near-syncope, orthopnea, palpitations, paroxysmal nocturnal dyspnea and syncope.   Respiratory: Negative for cough, shortness of breath and wheezing.    Hematologic/Lymphatic: Does not bruise/bleed easily.   Musculoskeletal: Negative for arthritis and joint pain.   Gastrointestinal: Negative for abdominal pain and change in bowel habit.   Genitourinary: Negative for hematuria and urgency.   Neurological: Negative for dizziness, headaches, light-headedness, loss of balance and weakness.   Psychiatric/Behavioral: Negative for altered mental status. The patient is not nervous/anxious.    All other systems reviewed and are negative.      Objective     Physical Exam:  Patient Vitals for the past 24 hrs:   BP Temp Temp  "src Pulse Resp SpO2 Height Weight   03/11/20 0700 102/56 98.1 °F (36.7 °C) Oral 72 16 96 % -- --   03/11/20 0048 132/79 97.8 °F (36.6 °C) Oral 73 16 100 % 162.6 cm (64.02\") 87 kg (191 lb 12.8 oz)   03/10/20 1900 133/96 -- -- 74 -- 99 % -- --   03/10/20 1830 132/84 -- -- 68 -- 99 % -- --   03/10/20 1815 125/84 -- -- 84 -- 99 % -- --   03/10/20 1800 122/85 -- -- 66 -- 99 % -- --   03/10/20 1745 126/83 -- -- 68 -- 100 % -- --   03/10/20 1735 125/82 -- -- 62 -- 99 % -- --   03/10/20 1725 138/89 -- -- 66 -- 98 % -- --   03/10/20 1614 135/81 97.4 °F (36.3 °C) -- 74 16 100 % 162.6 cm (64\") 83.9 kg (185 lb)     Telemetry:   Sinus tachycardia at rate of 105 this am. She has been running sinus rhythm rate 70-80's.     Physical Exam   Constitutional: She is oriented to person, place, and time. She appears well-developed and well-nourished. No distress.   HENT:   Head: Normocephalic and atraumatic.   Nose: Nose normal.   Eyes: Pupils are equal, round, and reactive to light.   Neck: Normal range of motion. Neck supple. Carotid bruit is not present.   Cardiovascular: Normal rate, regular rhythm and normal heart sounds.   No murmur heard.  Pulmonary/Chest: Effort normal and breath sounds normal. No respiratory distress. She has no wheezes. She has no rales.   Abdominal: Soft. Normal appearance. She exhibits no distension.   Musculoskeletal: Normal range of motion. She exhibits no edema.   Neurological: She is alert and oriented to person, place, and time.   Skin: Skin is warm. No rash noted. No erythema.   Psychiatric: She has a normal mood and affect. Her speech is normal and behavior is normal. Judgment and thought content normal.   Vitals reviewed.    Results Review:   I reviewed the patient's new clinical results.    Lab Results (last 72 hours)     Procedure Component Value Units Date/Time    T4, Free [[id number]]  (Abnormal) Collected:  03/11/20 0518    Specimen:  Blood Updated:  03/11/20 0807     Free T4 0.80 ng/dL     " Narrative:       Results may be falsely increased if patient taking Biotin.      TSH [[id number]]  (Abnormal) Collected:  03/11/20 0518    Specimen:  Blood Updated:  03/11/20 0807     TSH 6.530 uIU/mL     Troponin [[id number]]  (Normal) Collected:  03/11/20 0518    Specimen:  Blood Updated:  03/11/20 0555     Troponin T <0.010 ng/mL     Narrative:       Troponin T Reference Range:  <= 0.03 ng/mL-   Negative for AMI  >0.03 ng/mL-     Abnormal for myocardial necrosis.  Clinicians would have to utilize clinical acumen, EKG, Troponin and serial changes to determine if it is an Acute Myocardial Infarction or myocardial injury due to an underlying chronic condition.       Results may be falsely decreased if patient taking Biotin.      Troponin [[id number]]  (Normal) Collected:  03/11/20 0001    Specimen:  Blood Updated:  03/11/20 0026     Troponin T <0.010 ng/mL     Narrative:       Troponin T Reference Range:  <= 0.03 ng/mL-   Negative for AMI  >0.03 ng/mL-     Abnormal for myocardial necrosis.  Clinicians would have to utilize clinical acumen, EKG, Troponin and serial changes to determine if it is an Acute Myocardial Infarction or myocardial injury due to an underlying chronic condition.       Results may be falsely decreased if patient taking Biotin.      Troponin [[id number]]  (Normal) Collected:  03/10/20 1906    Specimen:  Blood Updated:  03/10/20 1930     Troponin T <0.010 ng/mL     Narrative:       Troponin T Reference Range:  <= 0.03 ng/mL-   Negative for AMI  >0.03 ng/mL-     Abnormal for myocardial necrosis.  Clinicians would have to utilize clinical acumen, EKG, Troponin and serial changes to determine if it is an Acute Myocardial Infarction or myocardial injury due to an underlying chronic condition.       Results may be falsely decreased if patient taking Biotin.      D-dimer, Quantitative [[id number]]  (Normal) Collected:  03/10/20 1609    Specimen:  Blood Updated:  03/10/20 1800     D-Dimer, Quantitative 0.29  mg/L (FEU)     Narrative:       Reference Range is 0-0.50 mg/L FEU. However, results <0.50 mg/L FEU tends to rule out DVT or PE. Results >0.50 mg/L FEU are not useful in predicting absence or presence of DVT or PE.      [facility] Draw [[id number]] Collected:  03/10/20 1609    Specimen:  Blood Updated:  03/10/20 1715    Narrative:       The following orders were created for panel order [facility] Draw.  Procedure                               Abnormality         Status                     ---------                               -----------         ------                     Light Blue Top[[id number]]                                   Final result               Green Top (Gel)[[id number]]                                  Final result               Lavender Top[[id number]]                                     Final result               Red Top[[id number]]                                          Final result                 Please view results for these tests on the individual orders.    Light Blue Top [[id number]] Collected:  03/10/20 1609    Specimen:  Blood Updated:  03/10/20 1715     Extra Tube hold for add-on     Comment: Auto resulted       Red Top [[id number]] Collected:  03/10/20 1609    Specimen:  Blood Updated:  03/10/20 1715     Extra Tube Hold for add-ons.     Comment: Auto resulted.       Green Top (Gel) [[id number]] Collected:  03/10/20 1609    Specimen:  Blood Updated:  03/10/20 1715     Extra Tube Hold for add-ons.     Comment: Auto resulted.       Lavender Top [[id number]] Collected:  03/10/20 1609    Specimen:  Blood Updated:  03/10/20 1715     Extra Tube hold for add-on     Comment: Auto resulted       Comprehensive Metabolic Panel [[id number]]  (Abnormal) Collected:  03/10/20 1609    Specimen:  Blood Updated:  03/10/20 1639     Glucose 100 mg/dL      BUN 14 mg/dL      Creatinine 0.68 mg/dL      Sodium 137 mmol/L      Potassium 4.2 mmol/L      Chloride 101 mmol/L      CO2 26.0 mmol/L      Calcium 9.0 mg/dL       Total Protein 7.2 g/dL      Albumin 4.30 g/dL      ALT (SGPT) 10 U/L      AST (SGOT) 13 U/L      Alkaline Phosphatase 94 U/L      Total Bilirubin <0.2 mg/dL      eGFR Non African Amer 95 mL/min/1.73      Globulin 2.9 gm/dL      A/G Ratio 1.5 g/dL      BUN/Creatinine Ratio 20.6     Anion Gap 10.0 mmol/L     Narrative:       GFR Normal >60  Chronic Kidney Disease <60  Kidney Failure <15      Troponin [[id number]]  (Normal) Collected:  03/10/20 1609    Specimen:  Blood Updated:  03/10/20 1639     Troponin T <0.010 ng/mL     Narrative:       Troponin T Reference Range:  <= 0.03 ng/mL-   Negative for AMI  >0.03 ng/mL-     Abnormal for myocardial necrosis.  Clinicians would have to utilize clinical acumen, EKG, Troponin and serial changes to determine if it is an Acute Myocardial Infarction or myocardial injury due to an underlying chronic condition.       Results may be falsely decreased if patient taking Biotin.      CBC & Differential [[id number]] Collected:  03/10/20 1609    Specimen:  Blood Updated:  03/10/20 1619    Narrative:       The following orders were created for panel order CBC & Differential.  Procedure                               Abnormality         Status                     ---------                               -----------         ------                     CBC Auto Differential[[id number]]        Abnormal            Final result                 Please view results for these tests on the individual orders.    CBC Auto Differential [[id number]]  (Abnormal) Collected:  03/10/20 1609    Specimen:  Blood Updated:  03/10/20 1619     WBC 8.63 10*3/mm3      RBC 4.68 10*6/mm3      Hemoglobin 12.7 g/dL      Hematocrit 38.3 %      MCV 81.8 fL      MCH 27.1 pg      MCHC 33.2 g/dL      RDW 14.3 %      RDW-SD 42.1 fl      MPV 9.2 fL      Platelets 322 10*3/mm3      Neutrophil % 61.3 %      Lymphocyte % 26.2 %      Monocyte % 7.3 %      Eosinophil % 3.7 %      Basophil % 0.8 %      Immature Grans % 0.7 %       Neutrophils, Absolute 5.29 10*3/mm3      Lymphocytes, Absolute 2.26 10*3/mm3      Monocytes, Absolute 0.63 10*3/mm3      Eosinophils, Absolute 0.32 10*3/mm3      Basophils, Absolute 0.07 10*3/mm3      Immature Grans, Absolute 0.06 10*3/mm3      nRBC 0.0 /100 WBC         No results found for: ECHOEFEST    Imaging Results (Last 72 Hours)     Procedure Component Value Units Date/Time    XR Chest 1 View [[id number]] Collected:  03/10/20 1839     Updated:  03/10/20 1842    Narrative:       EXAMINATION: XR CHEST 1 VW-. 3/10/2020 6:39 PM CDT     CHEST, ONE VIEW:     HISTORY: Chest pain     COMPARISON: None     A single frontal chest radiograph was obtained.     FINDINGS:     The lungs are clear without infiltrates.     The heart is normal in size, pulmonary circulation appropriate, without  heart failure.     The bony structures are intact without acute osseous abnormality.                                     Impression:       1. No acute cardiopulmonary process.     This report was finalized on 03/10/2020 18:39 by Dr. Ryne Perry MD.        Assessment/Plan       Chest pain    Plan:   -Chest pain; Troponin <0.010 x 4; EKG changed noted in ER (T inversion in V2 and V3 that resolved when patient was pain free); patient has been pain free since. Patient has been NPO. We will order a Stress ECHO today   -Continue Telemetry   -Blood pressures are well controlled; continue to monitor      Further orders per Dr Snow    Thank you for asking us to follow this patient with you.     Please note this cardiology consultation note is the result of a face to face consultation with the patient, in addition to reviewing medical records at length by myself, HU Moss.      HU Moss  03/11/20  10:17          Electronically signed by Louie Snow MD at 03/11/20 1654          Discharge Summary      Jacob Simms MD at 03/11/20 1830          [facility]  HISTORY AND PHYSICAL    Date of Admission:  "3/10/2020  Primary Care Physician: Jacob Simms MD    Subjective    Chief Complaint: \"my chest hurts\"    This is a 44 yr old lady who presented to the ed with chest pain. The ed contacted me and indicated she had subtle ekg changes while having chest pain that resolved with rsolution of the chest pain.       Review of Systems   Constitutional: Positive for fatigue.   HENT: Negative.    Eyes: Negative.    Respiratory: Negative.    Cardiovascular: Positive for chest pain.   Gastrointestinal: Negative.    Endocrine: Negative.    Genitourinary: Negative.    Musculoskeletal: Negative.    Skin: Negative.    Allergic/Immunologic: Negative.    Neurological: Negative.    Hematological: Negative.    Psychiatric/Behavioral: Negative.         Otherwise complete ROS reviewed and negative except as mentioned in the HPI.      Past Medical History:   Past Medical History:   Diagnosis Date   • Anxiety    • Asthma    • Bunion, right    • Cystitis    • Disease of thyroid gland    • Hypercholesteremia    • Plantar fascia syndrome    • Seasonal allergies    • Urge incontinence        Past Surgical History:  Past Surgical History:   Procedure Laterality Date   •  SECTION      x2   • CHOLECYSTECTOMY         Social History:  reports that she has never smoked. She has never used smokeless tobacco. She reports that she drinks alcohol. She reports that she does not use drugs.    Family History: family history includes Cancer in her father and mother; Diabetes in her mother; Hyperlipidemia in her father and mother; Kidney disease in her mother.     Allergies:  Allergies   Allergen Reactions   • Codeine Itching and GI Intolerance     Chest pain       Medications:  Prior to Admission medications    Medication Sig Start Date End Date Taking? Authorizing Provider   albuterol (PROVENTIL HFA;VENTOLIN HFA) 108 (90 BASE) MCG/ACT inhaler Inhale 2 puffs Every 4 (Four) Hours As Needed for wheezing. 3/3/17   Jacob Simms MD " "  cetirizine (zyrTEC) 10 MG tablet Take 10 mg by mouth Daily.    Provider, MD Percy   levothyroxine (SYNTHROID, LEVOTHROID) 100 MCG tablet Take 1 tablet by mouth daily 10/4/19   Jacob Simms MD   oxybutynin XL (DITROPAN-XL) 10 MG 24 hr tablet Take 1 tablet by mouth Daily. 10/28/19   Alisa Melaniecedrick WellsMaineELVIRA carrCEDRICK   sertraline (ZOLOFT) 100 MG tablet Take 2 tablets by mouth daily 3/5/20   Jacob Simms MD       Objective    Vital Signs: /82   Pulse 77   Temp 98.4 °F (36.9 °C) (Oral)   Resp 16   Ht 162.6 cm (64.02\")   Wt 87 kg (191 lb 12.8 oz)   SpO2 96%   BMI 32.91 kg/m²    Physical Exam   Constitutional: She is oriented to person, place, and time. She appears well-developed and well-nourished.   HENT:   Head: Normocephalic and atraumatic.   Right Ear: External ear normal.   Left Ear: External ear normal.   Nose: Nose normal.   Mouth/Throat: Oropharynx is clear and moist.   Eyes: Pupils are equal, round, and reactive to light. Conjunctivae and EOM are normal.   Neck: Normal range of motion. Neck supple.   Cardiovascular: Normal rate, regular rhythm, normal heart sounds and intact distal pulses.   Pulmonary/Chest: Effort normal and breath sounds normal.   Abdominal: Soft. Bowel sounds are normal.   Musculoskeletal: Normal range of motion.   Neurological: She is alert and oriented to person, place, and time.   Skin: Skin is warm and dry. Capillary refill takes less than 2 seconds.   Psychiatric: She has a normal mood and affect. Her behavior is normal. Judgment and thought content normal.   Nursing note and vitals reviewed.        Results Reviewed:  Lab Results (last 24 hours)     Procedure Component Value Units Date/Time    T4, Free [[id number]]  (Abnormal) Collected:  03/11/20 0518    Specimen:  Blood Updated:  03/11/20 0807     Free T4 0.80 ng/dL     Narrative:       Results may be falsely increased if patient taking Biotin.      TSH [[id number]]  (Abnormal) Collected:  03/11/20 " 0518    Specimen:  Blood Updated:  03/11/20 0807     TSH 6.530 uIU/mL     Troponin [[id number]]  (Normal) Collected:  03/11/20 0518    Specimen:  Blood Updated:  03/11/20 0555     Troponin T <0.010 ng/mL     Narrative:       Troponin T Reference Range:  <= 0.03 ng/mL-   Negative for AMI  >0.03 ng/mL-     Abnormal for myocardial necrosis.  Clinicians would have to utilize clinical acumen, EKG, Troponin and serial changes to determine if it is an Acute Myocardial Infarction or myocardial injury due to an underlying chronic condition.       Results may be falsely decreased if patient taking Biotin.      Troponin [[id number]]  (Normal) Collected:  03/11/20 0001    Specimen:  Blood Updated:  03/11/20 0026     Troponin T <0.010 ng/mL     Narrative:       Troponin T Reference Range:  <= 0.03 ng/mL-   Negative for AMI  >0.03 ng/mL-     Abnormal for myocardial necrosis.  Clinicians would have to utilize clinical acumen, EKG, Troponin and serial changes to determine if it is an Acute Myocardial Infarction or myocardial injury due to an underlying chronic condition.       Results may be falsely decreased if patient taking Biotin.      Troponin [[id number]]  (Normal) Collected:  03/10/20 1906    Specimen:  Blood Updated:  03/10/20 1930     Troponin T <0.010 ng/mL     Narrative:       Troponin T Reference Range:  <= 0.03 ng/mL-   Negative for AMI  >0.03 ng/mL-     Abnormal for myocardial necrosis.  Clinicians would have to utilize clinical acumen, EKG, Troponin and serial changes to determine if it is an Acute Myocardial Infarction or myocardial injury due to an underlying chronic condition.       Results may be falsely decreased if patient taking Biotin.          Imaging Results (Last 24 Hours)     Procedure Component Value Units Date/Time    XR Chest 1 View [[id number]] Collected:  03/10/20 1839     Updated:  03/10/20 1842    Narrative:       EXAMINATION: XR CHEST 1 VW-. 3/10/2020 6:39 PM CDT     CHEST, ONE VIEW:     HISTORY:  "Chest pain     COMPARISON: None     A single frontal chest radiograph was obtained.     FINDINGS:     The lungs are clear without infiltrates.     The heart is normal in size, pulmonary circulation appropriate, without  heart failure.     The bony structures are intact without acute osseous abnormality.                                     Impression:       1. No acute cardiopulmonary process.     This report was finalized on 03/10/2020 18:39 by Dr. Ryne Perry MD.            Active Hospital Problems    Diagnosis   • Chest pain       Assessment / Plan  Serial troponins, ekg, cardiac monitoring, consult cardiology      Code Status: full code     I discussed the patient's findings and my recommendations with the patient..    Estimated length of stay 24 hrs.    Jacob Simms MD   03/11/20   19:18    Electronically signed by Jacob Simms MD at 03/11/20 1922     Jacob Simms MD at 03/11/20 1830          [facility]   DISCHARGE SUMMARY       Date of Admission: 3/10/2020  Date of Discharge:  3/11/2020  Primary Care Physician: Jacob Simms MD    Presenting Problem/History of Present Illness:  Chest pain, unspecified type [R07.9]     Final Discharge Diagnoses:  Active Hospital Problems    Diagnosis   • Chest pain       Consults: cardiology    Procedures Performed: stress echo    Pertinent Test Results: neg stress echo,     Chief Complaint on Day of Discharge: \"im under some stress\"    History of Present Illness on Day of Discharge: I feel better now     Hospital Course:  The patient is a 44 y.o. female who presented to [facility] with presented to the ed with chest pain. She was admitted to my services neg troponin and ekgs serial.  Cardiology was consulted and a stress echo was neg for ischemia. The patients chest pain resolved and she indicated she was under a lot of stress..      Condition on Discharge:  stable    Physical Exam on Discharge:  /82   Pulse " "77   Temp 98.4 °F (36.9 °C) (Oral)   Resp 16   Ht 162.6 cm (64.02\")   Wt 87 kg (191 lb 12.8 oz)   SpO2 96%   BMI 32.91 kg/m²    Physical Exam   Cardiovascular: Normal rate, regular rhythm, normal heart sounds and intact distal pulses.   Pulmonary/Chest: Effort normal and breath sounds normal.   Abdominal: Soft. Bowel sounds are normal.   Nursing note and vitals reviewed.        Discharge Disposition:  Home or Self Care    Discharge Medications:     Discharge Medications      Continue These Medications      Instructions Start Date   albuterol sulfate  (90 Base) MCG/ACT inhaler  Commonly known as:  PROVENTIL HFA;VENTOLIN HFA;PROAIR HFA   2 puffs, Inhalation, Every 4 Hours PRN      cetirizine 10 MG tablet  Commonly known as:  zyrTEC   10 mg, Oral, Daily      oxybutynin XL 10 MG 24 hr tablet  Commonly known as:  DITROPAN-XL   10 mg, Oral, Daily      sertraline 100 MG tablet  Commonly known as:  ZOLOFT   Take 2 tablets by mouth daily      SYNTHROID 100 MCG tablet  Generic drug:  levothyroxine   Take 1 tablet by mouth daily             Discharge Diet:   As tolerated  Activity at Discharge:   As tolerated  Discharge Care Plan/Instructions: follow up prn    Follow-up Appointments:   No future appointments.    Test Results Pending at Discharge: none    Jacob Simms MD  03/11/20  19:25    Time: 7:28pm            Electronically signed by Jacob Simms MD at 03/11/20 1928       "

## 2020-03-12 NOTE — DISCHARGE SUMMARY
"[facility]   DISCHARGE SUMMARY       Date of Admission: 3/10/2020  Date of Discharge:  3/11/2020  Primary Care Physician: Jacob Simms MD    Presenting Problem/History of Present Illness:  Chest pain, unspecified type [R07.9]     Final Discharge Diagnoses:  Active Hospital Problems    Diagnosis   • Chest pain       Consults: cardiology    Procedures Performed: stress echo    Pertinent Test Results: neg stress echo,     Chief Complaint on Day of Discharge: \"im under some stress\"    History of Present Illness on Day of Discharge: I feel better now     Hospital Course:  The patient is a 44 y.o. female who presented to [facility] with presented to the ed with chest pain. She was admitted to my services neg troponin and ekgs serial.  Cardiology was consulted and a stress echo was neg for ischemia. The patients chest pain resolved and she indicated she was under a lot of stress..      Condition on Discharge:  stable    Physical Exam on Discharge:  /82   Pulse 77   Temp 98.4 °F (36.9 °C) (Oral)   Resp 16   Ht 162.6 cm (64.02\")   Wt 87 kg (191 lb 12.8 oz)   SpO2 96%   BMI 32.91 kg/m²   Physical Exam   Cardiovascular: Normal rate, regular rhythm, normal heart sounds and intact distal pulses.   Pulmonary/Chest: Effort normal and breath sounds normal.   Abdominal: Soft. Bowel sounds are normal.   Nursing note and vitals reviewed.        Discharge Disposition:  Home or Self Care    Discharge Medications:     Discharge Medications      Continue These Medications      Instructions Start Date   albuterol sulfate  (90 Base) MCG/ACT inhaler  Commonly known as:  PROVENTIL HFA;VENTOLIN HFA;PROAIR HFA   2 puffs, Inhalation, Every 4 Hours PRN      cetirizine 10 MG tablet  Commonly known as:  zyrTEC   10 mg, Oral, Daily      oxybutynin XL 10 MG 24 hr tablet  Commonly known as:  DITROPAN-XL   10 mg, Oral, Daily      sertraline 100 MG tablet  Commonly known as:  ZOLOFT   Take 2 tablets " by mouth daily      SYNTHROID 100 MCG tablet  Generic drug:  levothyroxine   Take 1 tablet by mouth daily             Discharge Diet:   As tolerated  Activity at Discharge:   As tolerated  Discharge Care Plan/Instructions: follow up prn    Follow-up Appointments:   No future appointments.    Test Results Pending at Discharge: none    Jacob Simms MD  03/11/20  19:25    Time: 7:28pm

## 2020-03-12 NOTE — DISCHARGE SUMMARY
"[facility]  HISTORY AND PHYSICAL    Date of Admission: 3/10/2020  Primary Care Physician: Jacob Simms MD    Subjective    Chief Complaint: \"my chest hurts\"    This is a 44 yr old lady who presented to the ed with chest pain. The ed contacted me and indicated she had subtle ekg changes while having chest pain that resolved with rsolution of the chest pain.       Review of Systems   Constitutional: Positive for fatigue.   HENT: Negative.    Eyes: Negative.    Respiratory: Negative.    Cardiovascular: Positive for chest pain.   Gastrointestinal: Negative.    Endocrine: Negative.    Genitourinary: Negative.    Musculoskeletal: Negative.    Skin: Negative.    Allergic/Immunologic: Negative.    Neurological: Negative.    Hematological: Negative.    Psychiatric/Behavioral: Negative.         Otherwise complete ROS reviewed and negative except as mentioned in the HPI.      Past Medical History:   Past Medical History:   Diagnosis Date   • Anxiety    • Asthma    • Bunion, right    • Cystitis    • Disease of thyroid gland    • Hypercholesteremia    • Plantar fascia syndrome    • Seasonal allergies    • Urge incontinence        Past Surgical History:  Past Surgical History:   Procedure Laterality Date   •  SECTION      x2   • CHOLECYSTECTOMY         Social History:  reports that she has never smoked. She has never used smokeless tobacco. She reports that she drinks alcohol. She reports that she does not use drugs.    Family History: family history includes Cancer in her father and mother; Diabetes in her mother; Hyperlipidemia in her father and mother; Kidney disease in her mother.     Allergies:  Allergies   Allergen Reactions   • Codeine Itching and GI Intolerance     Chest pain       Medications:  Prior to Admission medications    Medication Sig Start Date End Date Taking? Authorizing Provider   albuterol (PROVENTIL HFA;VENTOLIN HFA) 108 (90 BASE) MCG/ACT inhaler Inhale 2 puffs Every 4 (Four) " "Hours As Needed for wheezing. 3/3/17   Jacob Simms MD   cetirizine (zyrTEC) 10 MG tablet Take 10 mg by mouth Daily.    Provider, MD Percy   levothyroxine (SYNTHROID, LEVOTHROID) 100 MCG tablet Take 1 tablet by mouth daily 10/4/19   Jacob Simms MD   oxybutynin XL (DITROPAN-XL) 10 MG 24 hr tablet Take 1 tablet by mouth Daily. 10/28/19   Melanie Cuellar APRN   sertraline (ZOLOFT) 100 MG tablet Take 2 tablets by mouth daily 3/5/20   Jacob Simms MD       Objective    Vital Signs: /82   Pulse 77   Temp 98.4 °F (36.9 °C) (Oral)   Resp 16   Ht 162.6 cm (64.02\")   Wt 87 kg (191 lb 12.8 oz)   SpO2 96%   BMI 32.91 kg/m²   Physical Exam   Constitutional: She is oriented to person, place, and time. She appears well-developed and well-nourished.   HENT:   Head: Normocephalic and atraumatic.   Right Ear: External ear normal.   Left Ear: External ear normal.   Nose: Nose normal.   Mouth/Throat: Oropharynx is clear and moist.   Eyes: Pupils are equal, round, and reactive to light. Conjunctivae and EOM are normal.   Neck: Normal range of motion. Neck supple.   Cardiovascular: Normal rate, regular rhythm, normal heart sounds and intact distal pulses.   Pulmonary/Chest: Effort normal and breath sounds normal.   Abdominal: Soft. Bowel sounds are normal.   Musculoskeletal: Normal range of motion.   Neurological: She is alert and oriented to person, place, and time.   Skin: Skin is warm and dry. Capillary refill takes less than 2 seconds.   Psychiatric: She has a normal mood and affect. Her behavior is normal. Judgment and thought content normal.   Nursing note and vitals reviewed.        Results Reviewed:  Lab Results (last 24 hours)     Procedure Component Value Units Date/Time    T4, Free [[id number]]  (Abnormal) Collected:  03/11/20 0518    Specimen:  Blood Updated:  03/11/20 0807     Free T4 0.80 ng/dL     Narrative:       Results may be falsely increased if patient taking " Biotin.      TSH [[id number]]  (Abnormal) Collected:  03/11/20 0518    Specimen:  Blood Updated:  03/11/20 0807     TSH 6.530 uIU/mL     Troponin [[id number]]  (Normal) Collected:  03/11/20 0518    Specimen:  Blood Updated:  03/11/20 0555     Troponin T <0.010 ng/mL     Narrative:       Troponin T Reference Range:  <= 0.03 ng/mL-   Negative for AMI  >0.03 ng/mL-     Abnormal for myocardial necrosis.  Clinicians would have to utilize clinical acumen, EKG, Troponin and serial changes to determine if it is an Acute Myocardial Infarction or myocardial injury due to an underlying chronic condition.       Results may be falsely decreased if patient taking Biotin.      Troponin [[id number]]  (Normal) Collected:  03/11/20 0001    Specimen:  Blood Updated:  03/11/20 0026     Troponin T <0.010 ng/mL     Narrative:       Troponin T Reference Range:  <= 0.03 ng/mL-   Negative for AMI  >0.03 ng/mL-     Abnormal for myocardial necrosis.  Clinicians would have to utilize clinical acumen, EKG, Troponin and serial changes to determine if it is an Acute Myocardial Infarction or myocardial injury due to an underlying chronic condition.       Results may be falsely decreased if patient taking Biotin.      Troponin [[id number]]  (Normal) Collected:  03/10/20 1906    Specimen:  Blood Updated:  03/10/20 1930     Troponin T <0.010 ng/mL     Narrative:       Troponin T Reference Range:  <= 0.03 ng/mL-   Negative for AMI  >0.03 ng/mL-     Abnormal for myocardial necrosis.  Clinicians would have to utilize clinical acumen, EKG, Troponin and serial changes to determine if it is an Acute Myocardial Infarction or myocardial injury due to an underlying chronic condition.       Results may be falsely decreased if patient taking Biotin.          Imaging Results (Last 24 Hours)     Procedure Component Value Units Date/Time    XR Chest 1 View [[id number]] Collected:  03/10/20 1839     Updated:  03/10/20 1842    Narrative:       EXAMINATION: XR CHEST 1  VW-. 3/10/2020 6:39 PM CDT     CHEST, ONE VIEW:     HISTORY: Chest pain     COMPARISON: None     A single frontal chest radiograph was obtained.     FINDINGS:     The lungs are clear without infiltrates.     The heart is normal in size, pulmonary circulation appropriate, without  heart failure.     The bony structures are intact without acute osseous abnormality.                                     Impression:       1. No acute cardiopulmonary process.     This report was finalized on 03/10/2020 18:39 by Dr. Ryne Perry MD.            Active Hospital Problems    Diagnosis   • Chest pain       Assessment / Plan  Serial troponins, ekg, cardiac monitoring, consult cardiology      Code Status: full code     I discussed the patient's findings and my recommendations with the patient..    Estimated length of stay 24 hrs.    Jacob Simms MD   03/11/20   19:18

## 2020-03-18 ENCOUNTER — TELEPHONE (OUTPATIENT)
Dept: FAMILY MEDICINE CLINIC | Facility: CLINIC | Age: 45
End: 2020-03-18

## 2020-03-18 RX ORDER — SULFAMETHOXAZOLE AND TRIMETHOPRIM 800; 160 MG/1; MG/1
1 TABLET ORAL 2 TIMES DAILY
Qty: 14 TABLET | Refills: 0 | Status: SHIPPED | OUTPATIENT
Start: 2020-03-18 | End: 2020-03-25

## 2020-03-18 NOTE — TELEPHONE ENCOUNTER
PT STATED SHE MIGHT HAVE AN UTI OR A KIDNEY INFECTION AND WOULD LIKE TO KNOW IF AN ANTIBIOTIC COULD BE CALLED IN FOR HER.    PHARMACY CONFIRMED.    PT CAN BE REACHED [phone]

## 2020-04-17 RX ORDER — CETIRIZINE HYDROCHLORIDE 10 MG/1
10 TABLET ORAL DAILY
Qty: 30 TABLET | Refills: 2 | Status: SHIPPED | OUTPATIENT
Start: 2020-04-17

## 2020-04-17 NOTE — TELEPHONE ENCOUNTER
Patient called in to request RX refill of cetirizine (zyrTEC) 10 MG tablet    Patient has not been able to see her psychiatrist    Pharmacy confirmed     Please Advise

## 2020-04-22 RX ORDER — SERTRALINE HYDROCHLORIDE 100 MG/1
TABLET, FILM COATED ORAL
Qty: 60 TABLET | Refills: 1 | Status: SHIPPED | OUTPATIENT
Start: 2020-04-22 | End: 2020-07-06 | Stop reason: SDUPTHER

## 2020-04-22 NOTE — TELEPHONE ENCOUNTER
PT called to request a refill on the following medication: sertraline (ZOLOFT) 100 MG tablet.    States she had called to request a refill on 4/17/20, but Zyrtec was sent in instead of Zoloft.     Confirmed Pharmacy:   [facility] - [place], [place] - [address] [place] [address] - [phone]  - [phone] FX  [address] [place] [id number]  Phone: [phone] Fax: [phone]

## 2020-04-30 ENCOUNTER — TELEPHONE (OUTPATIENT)
Dept: FAMILY MEDICINE CLINIC | Facility: CLINIC | Age: 45
End: 2020-04-30

## 2020-04-30 DIAGNOSIS — F41.9 ANXIETY: ICD-10-CM

## 2020-04-30 RX ORDER — ALPRAZOLAM 0.5 MG/1
TABLET ORAL
Qty: 90 TABLET | Refills: 0 | Status: SHIPPED | OUTPATIENT
Start: 2020-04-30 | End: 2020-07-06 | Stop reason: SDUPTHER

## 2020-04-30 NOTE — TELEPHONE ENCOUNTER
----- Message from Lata Leon MA sent at 4/30/2020  2:52 PM CDT -----  Regarding: FW: Prescription Question  Contact: [phone]      ----- Message -----  From: Marjorie Barnett  Sent: 4/30/2020  11:43 AM CDT  To: [facility] [place]  Subject: Prescription Question                            Thank you for refilling the Zoloft prescription.    Getting into dr riddle right now is a mess.   I was hoping Dr Simms would be able to fill the Xanax and Adderrall until the stay at home order is over?    Thank you,    Marjorie Guido--ok for 30 days for now

## 2020-05-01 DIAGNOSIS — F98.8 ATTENTION DEFICIT DISORDER (ADD) WITHOUT HYPERACTIVITY: Primary | ICD-10-CM

## 2020-05-01 RX ORDER — DEXTROAMPHETAMINE SULFATE, DEXTROAMPHETAMINE SACCHARATE, AMPHETAMINE SULFATE AND AMPHETAMINE ASPARTATE 7.5; 7.5; 7.5; 7.5 MG/1; MG/1; MG/1; MG/1
30 CAPSULE, EXTENDED RELEASE ORAL EVERY MORNING
Qty: 30 CAPSULE | Refills: 0 | Status: SHIPPED | OUTPATIENT
Start: 2020-05-01 | End: 2021-01-11

## 2020-05-28 ENCOUNTER — TELEPHONE (OUTPATIENT)
Dept: FAMILY MEDICINE CLINIC | Facility: CLINIC | Age: 45
End: 2020-05-28

## 2020-05-28 DIAGNOSIS — F33.9 RECURRENT MAJOR DEPRESSIVE DISORDER, REMISSION STATUS UNSPECIFIED (HCC): ICD-10-CM

## 2020-05-28 DIAGNOSIS — E03.9 HYPOTHYROIDISM, UNSPECIFIED TYPE: ICD-10-CM

## 2020-05-28 DIAGNOSIS — I10 ESSENTIAL HYPERTENSION: Primary | ICD-10-CM

## 2020-05-28 RX ORDER — LEVOTHYROXINE SODIUM 0.1 MG/1
TABLET ORAL
Qty: 30 TABLET | Refills: 5 | Status: SHIPPED | OUTPATIENT
Start: 2020-05-28 | End: 2022-08-31

## 2020-05-28 NOTE — TELEPHONE ENCOUNTER
----- Message from Lata Leon MA sent at 5/28/2020 11:57 AM CDT -----  Regarding: FW: Prescription Question  Contact: [phone]  What all labs do you want ordered for sloane  ----- Message -----  From: Sloane Barnett  Sent: 5/28/2020  11:29 AM CDT  To: Patty [facility]  Subject: Prescription Question                            I saw that I am out of prescriptions for synthroid.    I can come up there to do bloodwork so I can continue my refill.    Dr horton used to check my levels and since she has been closed I haven't had them checked.   It probably wouldn't be a bad idea to get a full panel done.   Also, thank you for refilling my adderrall and Zoloft.   The adderrall was just for one month so I'm not sure if that something he is willing to continue or not?    Let me know when an appt is available to do blood work and I'll be happy to do it for my thyroid check and synthroid prescription      Cbc cmp lipid free t4 tsh vit d

## 2020-05-28 NOTE — TELEPHONE ENCOUNTER
----- Message from Lata Leon MA sent at 5/28/2020 11:57 AM CDT -----  Regarding: FW: Prescription Question  Contact: [phone]  What all labs do you want ordered for sloane  ----- Message -----  From: Sloane Barnett  Sent: 5/28/2020  11:29 AM CDT  To: Patty [facility] [place]  Subject: Prescription Question                            I saw that I am out of prescriptions for synthroid.    I can come up there to do bloodwork so I can continue my refill.    Dr horton used to check my levels and since she has been closed I haven't had them checked.   It probably wouldn't be a bad idea to get a full panel done.   Also, thank you for refilling my adderrall and Zoloft.   The adderrall was just for one month so I'm not sure if that something he is willing to continue or not?    Let me know when an appt is available to do blood work and I'll be happy to do it for my thyroid check and synthroid prescription

## 2020-06-18 ENCOUNTER — LAB (OUTPATIENT)
Dept: FAMILY MEDICINE CLINIC | Facility: CLINIC | Age: 45
End: 2020-06-18

## 2020-06-19 LAB
25(OH)D3+25(OH)D2 SERPL-MCNC: 20.6 NG/ML (ref 30–100)
ALBUMIN SERPL-MCNC: 4.6 G/DL (ref 3.5–5.2)
ALBUMIN/GLOB SERPL: 1.9 G/DL
ALP SERPL-CCNC: 93 U/L (ref 39–117)
ALT SERPL-CCNC: 10 U/L (ref 1–33)
AST SERPL-CCNC: 10 U/L (ref 1–32)
BASOPHILS # BLD AUTO: 0.07 10*3/MM3 (ref 0–0.2)
BASOPHILS NFR BLD AUTO: 0.8 % (ref 0–1.5)
BILIRUB SERPL-MCNC: 0.3 MG/DL (ref 0.2–1.2)
BUN SERPL-MCNC: 15 MG/DL (ref 6–20)
BUN/CREAT SERPL: 15.5 (ref 7–25)
CALCIUM SERPL-MCNC: 9 MG/DL (ref 8.6–10.5)
CHLORIDE SERPL-SCNC: 102 MMOL/L (ref 98–107)
CHOLEST SERPL-MCNC: 257 MG/DL (ref 0–200)
CHOLEST/HDLC SERPL: 5.35 {RATIO}
CO2 SERPL-SCNC: 24.7 MMOL/L (ref 22–29)
CREAT SERPL-MCNC: 0.97 MG/DL (ref 0.57–1)
EOSINOPHIL # BLD AUTO: 0.34 10*3/MM3 (ref 0–0.4)
EOSINOPHIL NFR BLD AUTO: 4.1 % (ref 0.3–6.2)
ERYTHROCYTE [DISTWIDTH] IN BLOOD BY AUTOMATED COUNT: 14.6 % (ref 12.3–15.4)
GLOBULIN SER CALC-MCNC: 2.4 GM/DL
GLUCOSE SERPL-MCNC: 120 MG/DL (ref 65–99)
HCT VFR BLD AUTO: 37.1 % (ref 34–46.6)
HDLC SERPL-MCNC: 48 MG/DL (ref 40–60)
HGB BLD-MCNC: 12.3 G/DL (ref 12–15.9)
IMM GRANULOCYTES # BLD AUTO: 0.04 10*3/MM3 (ref 0–0.05)
IMM GRANULOCYTES NFR BLD AUTO: 0.5 % (ref 0–0.5)
LDLC SERPL CALC-MCNC: 153 MG/DL (ref 0–100)
LYMPHOCYTES # BLD AUTO: 1.96 10*3/MM3 (ref 0.7–3.1)
LYMPHOCYTES NFR BLD AUTO: 23.5 % (ref 19.6–45.3)
MCH RBC QN AUTO: 27.3 PG (ref 26.6–33)
MCHC RBC AUTO-ENTMCNC: 33.2 G/DL (ref 31.5–35.7)
MCV RBC AUTO: 82.3 FL (ref 79–97)
MONOCYTES # BLD AUTO: 0.5 10*3/MM3 (ref 0.1–0.9)
MONOCYTES NFR BLD AUTO: 6 % (ref 5–12)
NEUTROPHILS # BLD AUTO: 5.43 10*3/MM3 (ref 1.7–7)
NEUTROPHILS NFR BLD AUTO: 65.1 % (ref 42.7–76)
NRBC BLD AUTO-RTO: 0 /100 WBC (ref 0–0.2)
PLATELET # BLD AUTO: 284 10*3/MM3 (ref 140–450)
POTASSIUM SERPL-SCNC: 4.2 MMOL/L (ref 3.5–5.2)
PROT SERPL-MCNC: 7 G/DL (ref 6–8.5)
RBC # BLD AUTO: 4.51 10*6/MM3 (ref 3.77–5.28)
SODIUM SERPL-SCNC: 137 MMOL/L (ref 136–145)
T4 FREE SERPL-MCNC: 0.87 NG/DL (ref 0.93–1.7)
TRIGL SERPL-MCNC: 282 MG/DL (ref 0–150)
TSH SERPL DL<=0.005 MIU/L-ACNC: 5.57 UIU/ML (ref 0.27–4.2)
VLDLC SERPL CALC-MCNC: 56.4 MG/DL
WBC # BLD AUTO: 8.34 10*3/MM3 (ref 3.4–10.8)

## 2020-06-19 RX ORDER — LEVOTHYROXINE SODIUM 125 MCG
125 TABLET ORAL DAILY
Qty: 90 TABLET | Refills: 1 | Status: SHIPPED | OUTPATIENT
Start: 2020-06-19 | End: 2020-12-23

## 2020-07-06 DIAGNOSIS — F41.9 ANXIETY: ICD-10-CM

## 2020-07-06 RX ORDER — SERTRALINE HYDROCHLORIDE 100 MG/1
TABLET, FILM COATED ORAL
Qty: 60 TABLET | Refills: 1 | OUTPATIENT
Start: 2020-07-06

## 2020-07-06 RX ORDER — ALPRAZOLAM 0.5 MG/1
TABLET ORAL
Qty: 90 TABLET | Refills: 0 | Status: SHIPPED | OUTPATIENT
Start: 2020-07-06 | End: 2020-08-31 | Stop reason: SDUPTHER

## 2020-07-06 RX ORDER — SERTRALINE HYDROCHLORIDE 100 MG/1
TABLET, FILM COATED ORAL
Qty: 60 TABLET | Refills: 1 | Status: SHIPPED | OUTPATIENT
Start: 2020-07-06 | End: 2020-09-15

## 2020-07-06 RX ORDER — ALPRAZOLAM 0.5 MG/1
TABLET ORAL
Qty: 90 TABLET | Refills: 0 | OUTPATIENT
Start: 2020-07-06

## 2020-07-13 ENCOUNTER — RESULTS ENCOUNTER (OUTPATIENT)
Dept: FAMILY MEDICINE CLINIC | Facility: CLINIC | Age: 45
End: 2020-07-13

## 2020-07-13 DIAGNOSIS — R52 BODY ACHES: ICD-10-CM

## 2020-07-13 DIAGNOSIS — R51.9 INTRACTABLE HEADACHE, UNSPECIFIED CHRONICITY PATTERN, UNSPECIFIED HEADACHE TYPE: Primary | ICD-10-CM

## 2020-07-13 DIAGNOSIS — R11.0 NAUSEA: ICD-10-CM

## 2020-07-13 DIAGNOSIS — R51.9 INTRACTABLE HEADACHE, UNSPECIFIED CHRONICITY PATTERN, UNSPECIFIED HEADACHE TYPE: ICD-10-CM

## 2020-08-31 ENCOUNTER — TELEPHONE (OUTPATIENT)
Dept: FAMILY MEDICINE CLINIC | Facility: CLINIC | Age: 45
End: 2020-08-31

## 2020-08-31 DIAGNOSIS — F41.9 ANXIETY: ICD-10-CM

## 2020-08-31 RX ORDER — ALPRAZOLAM 0.5 MG/1
TABLET ORAL
Qty: 90 TABLET | Refills: 0 | Status: SHIPPED | OUTPATIENT
Start: 2020-08-31 | End: 2020-10-20

## 2020-08-31 NOTE — TELEPHONE ENCOUNTER
----- Message from Lucy Hernandez MA sent at 8/31/2020  8:58 AM CDT -----  Regarding: FW: Non-Urgent Medical Question  Contact: [phone]      ----- Message -----  From: Marjorie Barnett  Sent: 8/31/2020   8:22 AM CDT  To: [facility]  Subject: Non-Urgent Medical Question                      Can Dr Simms continue to fill the subscription I have for Xanax?   I very much appreciate you guys being willing to do so in the past.      Ok for script

## 2020-09-15 RX ORDER — SERTRALINE HYDROCHLORIDE 100 MG/1
TABLET, FILM COATED ORAL
Qty: 60 TABLET | Refills: 1 | Status: SHIPPED | OUTPATIENT
Start: 2020-09-15 | End: 2020-11-25

## 2020-10-19 DIAGNOSIS — F41.9 ANXIETY: ICD-10-CM

## 2020-10-20 RX ORDER — ALPRAZOLAM 0.5 MG/1
TABLET ORAL
Qty: 90 TABLET | Refills: 0 | Status: SHIPPED | OUTPATIENT
Start: 2020-10-20 | End: 2020-12-15

## 2020-11-11 ENCOUNTER — TRANSCRIBE ORDERS (OUTPATIENT)
Dept: ADMINISTRATIVE | Facility: HOSPITAL | Age: 45
End: 2020-11-11

## 2020-11-11 DIAGNOSIS — Z12.31 ENCOUNTER FOR SCREENING MAMMOGRAM FOR MALIGNANT NEOPLASM OF BREAST: Primary | ICD-10-CM

## 2020-11-17 ENCOUNTER — TELEPHONE (OUTPATIENT)
Dept: FAMILY MEDICINE CLINIC | Facility: CLINIC | Age: 45
End: 2020-11-17

## 2020-11-17 NOTE — TELEPHONE ENCOUNTER
----- Message from Lata Leon MA sent at 11/17/2020  8:26 AM CST -----  Regarding: FW: Non-Urgent Medical Question  Contact: [phone]    ----- Message -----  From: Marjorie Barnett  Sent: 11/17/2020   7:45 AM CST  To: Patty [facility]  Subject: Non-Urgent Medical Question                      Was wondering if I could schedule time for bloodwork?    I am suspicious my thyroid is still not wear it needs to be (extreme fatigue, weight gain, hair falling out).    Please let me know if there is availability for me to come and do so?  Thank you    Ok for labs

## 2020-11-25 RX ORDER — SERTRALINE HYDROCHLORIDE 100 MG/1
TABLET, FILM COATED ORAL
Qty: 60 TABLET | Refills: 1 | Status: SHIPPED | OUTPATIENT
Start: 2020-11-25 | End: 2021-01-18

## 2020-12-02 DIAGNOSIS — R53.83 FATIGUE, UNSPECIFIED TYPE: ICD-10-CM

## 2020-12-02 DIAGNOSIS — E03.9 HYPOTHYROIDISM, UNSPECIFIED TYPE: Primary | ICD-10-CM

## 2020-12-02 DIAGNOSIS — I10 ESSENTIAL HYPERTENSION: ICD-10-CM

## 2020-12-03 ENCOUNTER — LAB (OUTPATIENT)
Dept: FAMILY MEDICINE CLINIC | Facility: CLINIC | Age: 45
End: 2020-12-03

## 2020-12-04 LAB
25(OH)D3+25(OH)D2 SERPL-MCNC: 26.4 NG/ML (ref 30–100)
ALBUMIN SERPL-MCNC: 4.2 G/DL (ref 3.5–5.2)
ALBUMIN/GLOB SERPL: 1.8 G/DL
ALP SERPL-CCNC: 111 U/L (ref 39–117)
ALT SERPL-CCNC: 20 U/L (ref 1–33)
AST SERPL-CCNC: 14 U/L (ref 1–32)
BASOPHILS # BLD AUTO: 0.08 10*3/MM3 (ref 0–0.2)
BASOPHILS NFR BLD AUTO: 1 % (ref 0–1.5)
BILIRUB SERPL-MCNC: 0.4 MG/DL (ref 0–1.2)
BUN SERPL-MCNC: 19 MG/DL (ref 6–20)
BUN/CREAT SERPL: 25.7 (ref 7–25)
CALCIUM SERPL-MCNC: 8.8 MG/DL (ref 8.6–10.5)
CHLORIDE SERPL-SCNC: 99 MMOL/L (ref 98–107)
CHOLEST SERPL-MCNC: 243 MG/DL (ref 0–200)
CO2 SERPL-SCNC: 26.9 MMOL/L (ref 22–29)
CREAT SERPL-MCNC: 0.74 MG/DL (ref 0.57–1)
EOSINOPHIL # BLD AUTO: 0.41 10*3/MM3 (ref 0–0.4)
EOSINOPHIL NFR BLD AUTO: 5.3 % (ref 0.3–6.2)
ERYTHROCYTE [DISTWIDTH] IN BLOOD BY AUTOMATED COUNT: 15 % (ref 12.3–15.4)
GLOBULIN SER CALC-MCNC: 2.3 GM/DL
GLUCOSE SERPL-MCNC: 107 MG/DL (ref 65–99)
HCT VFR BLD AUTO: 37.3 % (ref 34–46.6)
HDLC SERPL-MCNC: 47 MG/DL (ref 40–60)
HGB BLD-MCNC: 12.2 G/DL (ref 12–15.9)
IMM GRANULOCYTES # BLD AUTO: 0.06 10*3/MM3 (ref 0–0.05)
IMM GRANULOCYTES NFR BLD AUTO: 0.8 % (ref 0–0.5)
LDLC SERPL CALC-MCNC: 150 MG/DL (ref 0–100)
LYMPHOCYTES # BLD AUTO: 1.53 10*3/MM3 (ref 0.7–3.1)
LYMPHOCYTES NFR BLD AUTO: 19.8 % (ref 19.6–45.3)
MCH RBC QN AUTO: 25.7 PG (ref 26.6–33)
MCHC RBC AUTO-ENTMCNC: 32.7 G/DL (ref 31.5–35.7)
MCV RBC AUTO: 78.7 FL (ref 79–97)
MONOCYTES # BLD AUTO: 0.59 10*3/MM3 (ref 0.1–0.9)
MONOCYTES NFR BLD AUTO: 7.6 % (ref 5–12)
NEUTROPHILS # BLD AUTO: 5.06 10*3/MM3 (ref 1.7–7)
NEUTROPHILS NFR BLD AUTO: 65.5 % (ref 42.7–76)
NRBC BLD AUTO-RTO: 0 /100 WBC (ref 0–0.2)
PLATELET # BLD AUTO: 288 10*3/MM3 (ref 140–450)
POTASSIUM SERPL-SCNC: 4 MMOL/L (ref 3.5–5.2)
PROT SERPL-MCNC: 6.5 G/DL (ref 6–8.5)
RBC # BLD AUTO: 4.74 10*6/MM3 (ref 3.77–5.28)
SODIUM SERPL-SCNC: 135 MMOL/L (ref 136–145)
T4 FREE SERPL-MCNC: 1.33 NG/DL (ref 0.93–1.7)
TRIGL SERPL-MCNC: 249 MG/DL (ref 0–150)
TSH SERPL DL<=0.005 MIU/L-ACNC: 0.37 UIU/ML (ref 0.27–4.2)
VLDLC SERPL CALC-MCNC: 46 MG/DL (ref 5–40)
WBC # BLD AUTO: 7.73 10*3/MM3 (ref 3.4–10.8)

## 2020-12-14 DIAGNOSIS — N39.41 URGE INCONTINENCE OF URINE: ICD-10-CM

## 2020-12-14 RX ORDER — OXYBUTYNIN CHLORIDE 10 MG/1
10 TABLET, EXTENDED RELEASE ORAL DAILY
Qty: 30 TABLET | Refills: 1 | Status: SHIPPED | OUTPATIENT
Start: 2020-12-14 | End: 2021-01-11 | Stop reason: ALTCHOICE

## 2020-12-14 NOTE — TELEPHONE ENCOUNTER
Patient has upcoming yearly appointment in Jan 2021 but will be out of Oxybutynin before her apt. Patient requesting refill.

## 2020-12-15 DIAGNOSIS — F41.9 ANXIETY: ICD-10-CM

## 2020-12-15 RX ORDER — ALPRAZOLAM 0.5 MG/1
TABLET ORAL
Qty: 90 TABLET | Refills: 0 | Status: SHIPPED | OUTPATIENT
Start: 2020-12-15 | End: 2021-01-18

## 2020-12-23 RX ORDER — LEVOTHYROXINE SODIUM 125 MCG
TABLET ORAL
Qty: 90 TABLET | Refills: 1 | Status: SHIPPED | OUTPATIENT
Start: 2020-12-23 | End: 2021-06-28

## 2020-12-23 NOTE — TELEPHONE ENCOUNTER
Requested Prescriptions     Pending Prescriptions Disp Refills   • Synthroid 125 MCG tablet [Pharmacy Med Name: SYNTHROID TAB 0.125MG 0.125 MG. TABLET] 90 tablet 1     Sig: TAKE ONE TABLET DAILY (BRAND NAME PER DOCTOR)

## 2021-01-11 ENCOUNTER — OFFICE VISIT (OUTPATIENT)
Dept: OBSTETRICS AND GYNECOLOGY | Facility: CLINIC | Age: 46
End: 2021-01-11

## 2021-01-11 VITALS
BODY MASS INDEX: 34.83 KG/M2 | HEIGHT: 64 IN | WEIGHT: 204 LBS | SYSTOLIC BLOOD PRESSURE: 128 MMHG | DIASTOLIC BLOOD PRESSURE: 76 MMHG

## 2021-01-11 DIAGNOSIS — Z12.31 ENCOUNTER FOR SCREENING MAMMOGRAM FOR MALIGNANT NEOPLASM OF BREAST: ICD-10-CM

## 2021-01-11 DIAGNOSIS — Z12.4 CERVICAL CANCER SCREENING: ICD-10-CM

## 2021-01-11 DIAGNOSIS — Z01.419 WELL WOMAN EXAM WITH ROUTINE GYNECOLOGICAL EXAM: Primary | ICD-10-CM

## 2021-01-11 DIAGNOSIS — N39.41 URGE INCONTINENCE: ICD-10-CM

## 2021-01-11 PROCEDURE — 87625 HPV TYPES 16 & 18 ONLY: CPT | Performed by: NURSE PRACTITIONER

## 2021-01-11 PROCEDURE — 99396 PREV VISIT EST AGE 40-64: CPT | Performed by: NURSE PRACTITIONER

## 2021-01-11 PROCEDURE — 87624 HPV HI-RISK TYP POOLED RSLT: CPT | Performed by: NURSE PRACTITIONER

## 2021-01-11 PROCEDURE — G0123 SCREEN CERV/VAG THIN LAYER: HCPCS

## 2021-01-11 PROCEDURE — 99213 OFFICE O/P EST LOW 20 MIN: CPT | Performed by: NURSE PRACTITIONER

## 2021-01-11 NOTE — PROGRESS NOTES
Subjective   Marjorie Barnett is a 45 y.o. female  YOB: 1975        Chief Complaint   Patient presents with   • Gynecologic Exam     Patient here for yearly exam. Last exam was done 11/07/19 and was normal. Last mammo done 11/15/19 at [facility] and was normal, patient has order for mammo. Patient has Mirena IUD and is doing well with it. Patient has c/o of NIRAV. Patient would also like to discuss weight loss aids.        Gynecologic Exam  The patient's pertinent negatives include no pelvic pain. Pertinent negatives include no abdominal pain, back pain, constipation, diarrhea, dysuria, fever, frequency, hematuria, nausea, rash, sore throat, urgency or vomiting.       The following portions of the patient's history were reviewed and updated as appropriate: allergies, current medications, past family history, past medical history, past social history, past surgical history and problem list.    Allergies   Allergen Reactions   • Codeine Itching and GI Intolerance     Chest pain       Past Medical History:   Diagnosis Date   • Anxiety    • Asthma    • Bunion, right    • Cystitis    • Disease of thyroid gland    • Hypercholesteremia    • Plantar fascia syndrome    • Seasonal allergies    • Urge incontinence        Family History   Problem Relation Age of Onset   • Diabetes Mother    • Cancer Mother         kidney   • Kidney disease Mother    • Hyperlipidemia Mother    • Cancer Father         skin   • Hyperlipidemia Father    • Breast cancer Neg Hx    • Ovarian cancer Neg Hx    • Colon cancer Neg Hx        Social History     Socioeconomic History   • Marital status:      Spouse name: Not on file   • Number of children: Not on file   • Years of education: Not on file   • Highest education level: Not on file   Tobacco Use   • Smoking status: Never Smoker   • Smokeless tobacco: Never Used   Substance and Sexual Activity   • Alcohol use: Yes     Comment: occisonally   • Drug use: No   • Sexual activity: Defer          Current Outpatient Medications:   •  albuterol (PROVENTIL HFA;VENTOLIN HFA) 108 (90 BASE) MCG/ACT inhaler, Inhale 2 puffs Every 4 (Four) Hours As Needed for wheezing., Disp: 8 inhaler, Rfl: 1  •  ALPRAZolam (XANAX) 0.5 MG tablet, TAKE ONE TABLET THREE TIMES DAILY AS NEEDED ANXIETY GENERIC FOR XANAX, Disp: 90 tablet, Rfl: 0  •  cetirizine (zyrTEC) 10 MG tablet, Take 1 tablet by mouth Daily., Disp: 30 tablet, Rfl: 2  •  levonorgestrel (Mirena, 52 MG,) 20 MCG/24HR IUD, 1 each by Intrauterine route 1 (One) Time. Inserted by Dr Gutiérrez in 2017, Disp: , Rfl:   •  levothyroxine (SYNTHROID, LEVOTHROID) 100 MCG tablet, Take 1 tablet by mouth daily, Disp: 30 tablet, Rfl: 5  •  sertraline (ZOLOFT) 100 MG tablet, TAKE 2 TABLETS BY MOUTH DAILY, Disp: 60 tablet, Rfl: 1  •  Synthroid 125 MCG tablet, TAKE ONE TABLET DAILY (BRAND NAME PER DOCTOR), Disp: 90 tablet, Rfl: 1  •  Mirabegron ER (Myrbetriq) 25 MG tablet sustained-release 24 hour 24 hr tablet, Take 1 tablet by mouth Daily., Disp: 30 tablet, Rfl: 0    No LMP recorded. Patient has had an implant.    Sexual History:           Could not be calculated    Past Surgical History:   Procedure Laterality Date   •  SECTION      x2   • CHOLECYSTECTOMY         Review of Systems   Constitutional: Negative for activity change, appetite change, fatigue, fever, unexpected weight gain and unexpected weight loss.   HENT: Negative for congestion, ear pain, hearing loss, nosebleeds, rhinorrhea, sore throat, tinnitus and trouble swallowing.    Eyes: Negative for blurred vision, pain, discharge, itching and visual disturbance.   Respiratory: Negative for apnea, chest tightness, shortness of breath and wheezing.    Cardiovascular: Negative for chest pain and leg swelling.   Gastrointestinal: Negative for abdominal pain, blood in stool, constipation, diarrhea, nausea, vomiting and GERD.   Endocrine: Negative for heat intolerance, polydipsia and polyuria.   Genitourinary: Negative  for breast lump, decreased libido, difficulty urinating, dyspareunia, dysuria, frequency, genital sores, hematuria, menstrual problem, pelvic pain, urgency, urinary incontinence and vaginal pain.   Musculoskeletal: Negative for arthralgias, back pain, joint swelling and myalgias.   Skin: Negative for color change, rash and skin lesions.   Allergic/Immunologic: Negative for environmental allergies, food allergies and immunocompromised state.   Neurological: Negative for dizziness, tremors, seizures, syncope, facial asymmetry, numbness and headache.   Hematological: Negative for adenopathy. Does not bruise/bleed easily.   Psychiatric/Behavioral: Negative for agitation, hallucinations, sleep disturbance, suicidal ideas and depressed mood. The patient is not nervous/anxious.        Objective   Physical Exam  Vitals signs and nursing note reviewed. Exam conducted with a chaperone present.   Constitutional:       General: She is not in acute distress.     Appearance: She is well-developed. She is not diaphoretic.   HENT:      Head: Normocephalic.      Right Ear: External ear normal.      Left Ear: External ear normal.      Nose: Nose normal.   Eyes:      General: No scleral icterus.        Right eye: No discharge.         Left eye: No discharge.      Conjunctiva/sclera: Conjunctivae normal.   Neck:      Musculoskeletal: Normal range of motion and neck supple.      Thyroid: No thyromegaly.      Vascular: No carotid bruit.      Trachea: No tracheal deviation.   Cardiovascular:      Rate and Rhythm: Normal rate and regular rhythm.      Heart sounds: Normal heart sounds. No murmur.   Pulmonary:      Effort: Pulmonary effort is normal. No respiratory distress.      Breath sounds: Normal breath sounds. No wheezing.   Chest:      Breasts: Breasts are symmetrical.         Right: No inverted nipple, mass, nipple discharge, skin change or tenderness.         Left: No inverted nipple, mass, nipple discharge, skin change or  tenderness.   Abdominal:      General: There is no distension.      Palpations: Abdomen is soft. There is no mass.      Tenderness: There is no abdominal tenderness. There is no guarding.      Hernia: No hernia is present. There is no hernia in the left inguinal area or right inguinal area.   Genitourinary:     General: Normal vulva.      Exam position: Lithotomy position.      Labia:         Right: No rash, tenderness, lesion or injury.         Left: No rash, tenderness, lesion or injury.       Vagina: Normal. No signs of injury and foreign body. No vaginal discharge, erythema, tenderness or bleeding.      Cervix: Normal.      Uterus: Normal. Not enlarged, not fixed and not tender.       Adnexa: Right adnexa normal and left adnexa normal.        Right: No mass, tenderness or fullness.          Left: No mass, tenderness or fullness.        Rectum: Normal. No mass.      Comments:   BSU normal  Urethral meatus  Normal  Perineum  Normal  Musculoskeletal: Normal range of motion.         General: No tenderness.   Lymphadenopathy:      Head:      Right side of head: No submental, submandibular, tonsillar, preauricular, posterior auricular or occipital adenopathy.      Left side of head: No submental, submandibular, tonsillar, preauricular, posterior auricular or occipital adenopathy.      Cervical: No cervical adenopathy.      Right cervical: No superficial, deep or posterior cervical adenopathy.     Left cervical: No superficial, deep or posterior cervical adenopathy.      Lower Body: No right inguinal adenopathy. No left inguinal adenopathy.   Skin:     General: Skin is warm and dry.      Findings: No bruising, erythema or rash.   Neurological:      Mental Status: She is alert and oriented to person, place, and time.      Coordination: Coordination normal.   Psychiatric:         Mood and Affect: Mood normal.         Behavior: Behavior normal.         Thought Content: Thought content normal.         Judgment: Judgment  "normal.           Vitals:    01/11/21 1109   BP: 128/76   Weight: 92.5 kg (204 lb)   Height: 162.6 cm (64\")       Diagnoses and all orders for this visit:    1. Well woman exam with routine gynecological exam (Primary)  Comments:  Normal well woman exam.  Thin prep pap smear done.  Mammmogram ordered.   Orders:  -     Liquid-based Pap Smear, Screening    2. Cervical cancer screening  Comments:  Thin prep Pap smear done.  Orders:  -     Liquid-based Pap Smear, Screening    3. Encounter for screening mammogram for malignant neoplasm of breast  Comments:  Mammogram ordered.    4. Urge incontinence  Comments:  Patient on oxybutynin related to urge incontinence.  Orders:  -     Mirabegron ER (Myrbetriq) 25 MG tablet sustained-release 24 hour 24 hr tablet; Take 1 tablet by mouth Daily.  Dispense: 30 tablet; Refill: 0        Normal GYN exam. Will have lab work here. Encouraged SBE.  Pt is aware how to do self breast exam and the importance of same. Discussed weight management and importance of maintaining a healthy weight. Discussed Vitamin D intake and the importance of adequate vitamin D for both bone health and a healthy immune system.  Discussed daily exercise and the importance of same in regards to a healthy heart as well as helping to maintain her weight and improving her mental health.  Body mass index is 35.02 kg/m². Colonoscopy is not age appropriate.  Mammogram will be scheduled at [facility]. Pap smear is done per ASCCP guidelines.    Patient's Body mass index is 35.02 kg/m². BMI is above normal parameters. Recommendations include: exercise counseling and nutrition counseling.             Non-Smoker    MyChart Instructions Given       "

## 2021-01-13 LAB
GEN CATEG CVX/VAG CYTO-IMP: NORMAL
HPV I/H RISK 4 DNA CVX QL PROBE+SIG AMP: DETECTED
HPV16 DNA SPEC QL NAA+PROBE: NOT DETECTED
HPV18+45 E6+E7 MRNA CVX QL NAA+PROBE: NOT DETECTED
LAB AP CASE REPORT: NORMAL
LAB AP GYN ADDITIONAL INFORMATION: NORMAL
LAB AP GYN OTHER FINDINGS: NORMAL
PATH INTERP SPEC-IMP: NORMAL
STAT OF ADQ CVX/VAG CYTO-IMP: NORMAL

## 2021-01-14 ENCOUNTER — IMMUNIZATION (OUTPATIENT)
Dept: VACCINE CLINIC | Facility: HOSPITAL | Age: 46
End: 2021-01-14

## 2021-01-14 PROCEDURE — 91301 HC SARSCO02 VAC 100MCG/0.5ML IM: CPT | Performed by: OBSTETRICS & GYNECOLOGY

## 2021-01-14 PROCEDURE — 0011A: CPT | Performed by: OBSTETRICS & GYNECOLOGY

## 2021-01-14 PROCEDURE — 0012A: CPT | Performed by: OBSTETRICS & GYNECOLOGY

## 2021-01-16 DIAGNOSIS — F41.9 ANXIETY: ICD-10-CM

## 2021-01-18 RX ORDER — ALPRAZOLAM 0.5 MG/1
TABLET ORAL
Qty: 90 TABLET | Refills: 0 | Status: SHIPPED | OUTPATIENT
Start: 2021-01-18 | End: 2021-03-05

## 2021-01-18 RX ORDER — SERTRALINE HYDROCHLORIDE 100 MG/1
TABLET, FILM COATED ORAL
Qty: 60 TABLET | Refills: 1 | Status: SHIPPED | OUTPATIENT
Start: 2021-01-18 | End: 2021-03-29

## 2021-01-26 DIAGNOSIS — N39.41 URGE INCONTINENCE: Primary | ICD-10-CM

## 2021-01-26 RX ORDER — OXYBUTYNIN CHLORIDE 10 MG/1
10 TABLET, EXTENDED RELEASE ORAL DAILY
Qty: 30 TABLET | Refills: 11 | Status: SHIPPED | OUTPATIENT
Start: 2021-01-26 | End: 2022-03-11 | Stop reason: SDUPTHER

## 2021-02-11 ENCOUNTER — IMMUNIZATION (OUTPATIENT)
Dept: VACCINE CLINIC | Facility: HOSPITAL | Age: 46
End: 2021-02-11

## 2021-02-11 PROCEDURE — 0012A: CPT | Performed by: OBSTETRICS & GYNECOLOGY

## 2021-02-11 PROCEDURE — 91301 HC SARSCO02 VAC 100MCG/0.5ML IM: CPT | Performed by: OBSTETRICS & GYNECOLOGY

## 2021-03-01 ENCOUNTER — TELEPHONE (OUTPATIENT)
Dept: FAMILY MEDICINE CLINIC | Facility: CLINIC | Age: 46
End: 2021-03-01

## 2021-03-01 DIAGNOSIS — F98.8 ATTENTION DEFICIT DISORDER, UNSPECIFIED HYPERACTIVITY PRESENCE: Primary | ICD-10-CM

## 2021-03-01 DIAGNOSIS — F41.9 ANXIETY: ICD-10-CM

## 2021-03-01 RX ORDER — DEXTROAMPHETAMINE SACCHARATE, AMPHETAMINE ASPARTATE MONOHYDRATE, DEXTROAMPHETAMINE SULFATE AND AMPHETAMINE SULFATE 5; 5; 5; 5 MG/1; MG/1; MG/1; MG/1
20 CAPSULE, EXTENDED RELEASE ORAL EVERY MORNING
Qty: 30 CAPSULE | Refills: 0 | Status: SHIPPED | OUTPATIENT
Start: 2021-03-01 | End: 2021-06-17 | Stop reason: SDUPTHER

## 2021-03-01 NOTE — TELEPHONE ENCOUNTER
----- Message from Lata Leon MA sent at 3/1/2021  1:53 PM CST -----  Regarding: FW: Prescription Question  Contact: [phone]    ----- Message -----  From: Marjorie Barnett  Sent: 3/1/2021  12:59 PM CST  To: [facility]  Subject: Prescription Question                            Hey!   Do you think Dr Simms would be willing to write my adderrall script for me since I don't go to dr Guardado anymore?    It really did help.   I thought I could go without it during covid but the busier I am with work (which reminds me I need to bring you guys some cards even though I'm on a wait list right now) and more stress on my plate I see that I function way better with it.    My blood pressure has been fine.    Let me know what you think!    Thank you!    Marjorie Law for script for her

## 2021-03-05 DIAGNOSIS — F41.9 ANXIETY: ICD-10-CM

## 2021-03-05 RX ORDER — ALPRAZOLAM 0.5 MG/1
TABLET ORAL
Qty: 90 TABLET | Refills: 0 | Status: SHIPPED | OUTPATIENT
Start: 2021-03-05 | End: 2021-05-04

## 2021-03-05 NOTE — TELEPHONE ENCOUNTER
LAST FILLED 1/18/21      Requested Prescriptions     Pending Prescriptions Disp Refills   • ALPRAZolam (XANAX) 0.5 MG tablet [Pharmacy Med Name: ALPRAZOLAM 0.5 MG TABLETS] 90 tablet      Sig: TAKE ONE TABLET THREE TIMES DAILY AS NEEDED FOR ANXIETY GENERIC FOR XANAX       .

## 2021-03-29 RX ORDER — SERTRALINE HYDROCHLORIDE 100 MG/1
200 TABLET, FILM COATED ORAL DAILY
Qty: 60 TABLET | Refills: 0 | Status: SHIPPED | OUTPATIENT
Start: 2021-03-29 | End: 2021-05-04

## 2021-03-29 NOTE — TELEPHONE ENCOUNTER
Last ov 7/18/2018        Requested Prescriptions     Pending Prescriptions Disp Refills   • sertraline (ZOLOFT) 100 MG tablet [Pharmacy Med Name: SERTRALINE TAB 100MG 30  NSTAR@ TABLET] 60 tablet 0     Sig: Take 2 tablets by mouth Daily. CALL OFFICE FOR APPOINTMENT

## 2021-05-03 DIAGNOSIS — F41.9 ANXIETY: ICD-10-CM

## 2021-05-04 RX ORDER — SERTRALINE HYDROCHLORIDE 100 MG/1
200 TABLET, FILM COATED ORAL DAILY
Qty: 20 TABLET | Refills: 0 | Status: SHIPPED | OUTPATIENT
Start: 2021-05-04 | End: 2021-05-21

## 2021-05-04 RX ORDER — ALPRAZOLAM 0.5 MG/1
0.5 TABLET ORAL 3 TIMES DAILY PRN
Qty: 30 TABLET | Refills: 0 | Status: SHIPPED | OUTPATIENT
Start: 2021-05-04 | End: 2021-05-28 | Stop reason: SDUPTHER

## 2021-05-04 NOTE — TELEPHONE ENCOUNTER
LAST OFFICE VISIT 7/18/2018      Requested Prescriptions     Pending Prescriptions Disp Refills   • sertraline (ZOLOFT) 100 MG tablet [Pharmacy Med Name: SERTRALINE TAB 100MG 30  NSTAR@ TABLET] 20 tablet 0     Sig: TAKE 2 TABLETS BY MOUTH DAILY. CALL OFFICE FOR APPOINTMENT   • ALPRAZolam (XANAX) 0.5 MG tablet [Pharmacy Med Name: ALPRAZOLAM 0.5 MG TABLETS] 30 tablet 0     Sig: Take 1 tablet by mouth 3 (Three) Times a Day As Needed for Anxiety. CALL OFFICE FOR APPOINTMENT

## 2021-05-21 RX ORDER — SERTRALINE HYDROCHLORIDE 100 MG/1
200 TABLET, FILM COATED ORAL DAILY
Qty: 20 TABLET | Refills: 0 | Status: SHIPPED | OUTPATIENT
Start: 2021-05-21 | End: 2021-06-03

## 2021-05-24 ENCOUNTER — PATIENT MESSAGE (OUTPATIENT)
Dept: FAMILY MEDICINE CLINIC | Facility: CLINIC | Age: 46
End: 2021-05-24

## 2021-05-24 DIAGNOSIS — F41.9 ANXIETY: ICD-10-CM

## 2021-05-28 RX ORDER — ALPRAZOLAM 0.5 MG/1
0.5 TABLET ORAL 3 TIMES DAILY PRN
Qty: 30 TABLET | Refills: 0 | Status: SHIPPED | OUTPATIENT
Start: 2021-05-28 | End: 2021-06-17 | Stop reason: SDUPTHER

## 2021-06-02 ENCOUNTER — OFFICE VISIT (OUTPATIENT)
Dept: FAMILY MEDICINE CLINIC | Facility: CLINIC | Age: 46
End: 2021-06-02

## 2021-06-02 VITALS
DIASTOLIC BLOOD PRESSURE: 80 MMHG | BODY MASS INDEX: 35 KG/M2 | WEIGHT: 205 LBS | OXYGEN SATURATION: 98 % | TEMPERATURE: 97.5 F | HEIGHT: 64 IN | HEART RATE: 74 BPM | SYSTOLIC BLOOD PRESSURE: 128 MMHG

## 2021-06-02 DIAGNOSIS — F33.9 RECURRENT MAJOR DEPRESSIVE DISORDER, REMISSION STATUS UNSPECIFIED (HCC): ICD-10-CM

## 2021-06-02 DIAGNOSIS — E03.9 HYPOTHYROIDISM, UNSPECIFIED TYPE: Primary | ICD-10-CM

## 2021-06-02 PROCEDURE — 99213 OFFICE O/P EST LOW 20 MIN: CPT | Performed by: FAMILY MEDICINE

## 2021-06-02 NOTE — PROGRESS NOTES
Subjective   Marjorie Barnett is a 45 y.o. female.     Chief Complaint   Patient presents with   • Follow-up     Patient presentst today for check up and labs   • Med Refill        History of Present Illness     she is feeling well with her mediation--denies being suicidal      Current Outpatient Medications:   •  albuterol (PROVENTIL HFA;VENTOLIN HFA) 108 (90 BASE) MCG/ACT inhaler, Inhale 2 puffs Every 4 (Four) Hours As Needed for wheezing., Disp: 8 inhaler, Rfl: 1  •  ALPRAZolam (XANAX) 0.5 MG tablet, Take 1 tablet by mouth 3 (Three) Times a Day As Needed for Anxiety. CALL OFFICE FOR APPOINTMENT, Disp: 30 tablet, Rfl: 0  •  cetirizine (zyrTEC) 10 MG tablet, Take 1 tablet by mouth Daily., Disp: 30 tablet, Rfl: 2  •  levonorgestrel (Mirena, 52 MG,) 20 MCG/24HR IUD, 1 each by Intrauterine route 1 (One) Time. Inserted by Dr Gutiérrez in 2017, Disp: , Rfl:   •  oxybutynin XL (DITROPAN-XL) 10 MG 24 hr tablet, Take 1 tablet by mouth Daily., Disp: 30 tablet, Rfl: 11  •  sertraline (ZOLOFT) 100 MG tablet, TAKE 2 TABLETS BY MOUTH DAILY. CALL OFFICE FOR APPOINTMENT, Disp: 20 tablet, Rfl: 0  •  Synthroid 125 MCG tablet, TAKE ONE TABLET DAILY (BRAND NAME PER DOCTOR), Disp: 90 tablet, Rfl: 1  •  amphetamine-dextroamphetamine XR (Adderall XR) 20 MG 24 hr capsule, Take 1 capsule by mouth Every Morning, Disp: 30 capsule, Rfl: 0  •  levothyroxine (SYNTHROID, LEVOTHROID) 100 MCG tablet, Take 1 tablet by mouth daily, Disp: 30 tablet, Rfl: 5  Allergies   Allergen Reactions   • Codeine Itching and GI Intolerance     Chest pain       Past Medical History:   Diagnosis Date   • Anxiety    • Asthma    • Bunion, right    • Cystitis    • Disease of thyroid gland    • Hypercholesteremia    • Plantar fascia syndrome    • Seasonal allergies    • Urge incontinence      Past Surgical History:   Procedure Laterality Date   •  SECTION      x2   • CHOLECYSTECTOMY         Review of Systems   Constitutional: Negative.    HENT: Negative.   "  Eyes: Negative.    Respiratory: Negative.    Cardiovascular: Negative.    Gastrointestinal: Negative.    Endocrine: Negative.    Genitourinary: Negative.    Musculoskeletal: Negative.    Skin: Negative.    Allergic/Immunologic: Negative.    Neurological: Negative.    Hematological: Negative.    Psychiatric/Behavioral: Negative.        Objective  /80 (BP Location: Right arm, Patient Position: Sitting, Cuff Size: Adult)   Pulse 74   Temp 97.5 °F (36.4 °C) (Infrared)   Ht 162.6 cm (64\")   Wt 93 kg (205 lb)   SpO2 98%   BMI 35.19 kg/m²   Physical Exam  Vitals and nursing note reviewed.   Constitutional:       Appearance: Normal appearance. She is normal weight.   HENT:      Head: Normocephalic and atraumatic.      Nose: Nose normal.      Mouth/Throat:      Mouth: Mucous membranes are moist.      Pharynx: Oropharynx is clear.   Eyes:      Extraocular Movements: Extraocular movements intact.      Conjunctiva/sclera: Conjunctivae normal.      Pupils: Pupils are equal, round, and reactive to light.   Cardiovascular:      Rate and Rhythm: Normal rate and regular rhythm.      Pulses: Normal pulses.      Heart sounds: Normal heart sounds.   Pulmonary:      Effort: Pulmonary effort is normal.      Breath sounds: Normal breath sounds.   Abdominal:      General: Abdomen is flat. Bowel sounds are normal.      Palpations: Abdomen is soft.   Musculoskeletal:         General: Normal range of motion.      Cervical back: Normal range of motion.   Skin:     General: Skin is warm.      Capillary Refill: Capillary refill takes less than 2 seconds.   Neurological:      General: No focal deficit present.      Mental Status: She is alert and oriented to person, place, and time. Mental status is at baseline.   Psychiatric:         Mood and Affect: Mood normal.         Behavior: Behavior normal.         Thought Content: Thought content normal.         Judgment: Judgment normal.         Assessment/Plan   Diagnoses and all orders " for this visit:    1. Hypothyroidism, unspecified type (Primary)  -     CBC & Differential  -     Comprehensive metabolic panel  -     Lipid Panel With / Chol / HDL Ratio  -     TSH  -     Vitamin D 25 Hydroxy    2. Recurrent major depressive disorder, remission status unspecified (CMS/HCC)  -     CBC & Differential  -     Comprehensive metabolic panel  -     Lipid Panel With / Chol / HDL Ratio  -     TSH  -     Vitamin D 25 Hydroxy      She will continue with her gyn           Orders Placed This Encounter   Procedures   • Comprehensive metabolic panel     Order Specific Question:   Release to patient     Answer:   Immediate   • Lipid Panel With / Chol / HDL Ratio     Order Specific Question:   Release to patient     Answer:   Immediate   • TSH     Order Specific Question:   Release to patient     Answer:   Immediate   • Vitamin D 25 Hydroxy     Order Specific Question:   Release to patient     Answer:   Immediate   • CBC & Differential       Follow up: 6 month(s)

## 2021-06-03 LAB
25(OH)D3+25(OH)D2 SERPL-MCNC: 23.1 NG/ML (ref 30–100)
ALBUMIN SERPL-MCNC: 4.4 G/DL (ref 3.5–5.2)
ALBUMIN/GLOB SERPL: 1.9 G/DL
ALP SERPL-CCNC: 102 U/L (ref 39–117)
ALT SERPL-CCNC: 16 U/L (ref 1–33)
AST SERPL-CCNC: 12 U/L (ref 1–32)
BASOPHILS # BLD AUTO: 0.07 10*3/MM3 (ref 0–0.2)
BASOPHILS NFR BLD AUTO: 0.8 % (ref 0–1.5)
BILIRUB SERPL-MCNC: 0.3 MG/DL (ref 0–1.2)
BUN SERPL-MCNC: 14 MG/DL (ref 6–20)
BUN/CREAT SERPL: 17.7 (ref 7–25)
CALCIUM SERPL-MCNC: 9.2 MG/DL (ref 8.6–10.5)
CHLORIDE SERPL-SCNC: 101 MMOL/L (ref 98–107)
CHOLEST SERPL-MCNC: 249 MG/DL (ref 0–200)
CHOLEST/HDLC SERPL: 5.79 {RATIO}
CO2 SERPL-SCNC: 26.2 MMOL/L (ref 22–29)
CREAT SERPL-MCNC: 0.79 MG/DL (ref 0.57–1)
EOSINOPHIL # BLD AUTO: 0.26 10*3/MM3 (ref 0–0.4)
EOSINOPHIL NFR BLD AUTO: 3.1 % (ref 0.3–6.2)
ERYTHROCYTE [DISTWIDTH] IN BLOOD BY AUTOMATED COUNT: 15.1 % (ref 12.3–15.4)
GLOBULIN SER CALC-MCNC: 2.3 GM/DL
GLUCOSE SERPL-MCNC: 88 MG/DL (ref 65–99)
HCT VFR BLD AUTO: 38.3 % (ref 34–46.6)
HDLC SERPL-MCNC: 43 MG/DL (ref 40–60)
HGB BLD-MCNC: 12.1 G/DL (ref 12–15.9)
IMM GRANULOCYTES # BLD AUTO: 0.08 10*3/MM3 (ref 0–0.05)
IMM GRANULOCYTES NFR BLD AUTO: 0.9 % (ref 0–0.5)
LDLC SERPL CALC-MCNC: 137 MG/DL (ref 0–100)
LYMPHOCYTES # BLD AUTO: 1.66 10*3/MM3 (ref 0.7–3.1)
LYMPHOCYTES NFR BLD AUTO: 19.6 % (ref 19.6–45.3)
MCH RBC QN AUTO: 26 PG (ref 26.6–33)
MCHC RBC AUTO-ENTMCNC: 31.6 G/DL (ref 31.5–35.7)
MCV RBC AUTO: 82.4 FL (ref 79–97)
MONOCYTES # BLD AUTO: 0.49 10*3/MM3 (ref 0.1–0.9)
MONOCYTES NFR BLD AUTO: 5.8 % (ref 5–12)
NEUTROPHILS # BLD AUTO: 5.93 10*3/MM3 (ref 1.7–7)
NEUTROPHILS NFR BLD AUTO: 69.8 % (ref 42.7–76)
NRBC BLD AUTO-RTO: 0 /100 WBC (ref 0–0.2)
PLATELET # BLD AUTO: 324 10*3/MM3 (ref 140–450)
POTASSIUM SERPL-SCNC: 4.4 MMOL/L (ref 3.5–5.2)
PROT SERPL-MCNC: 6.7 G/DL (ref 6–8.5)
RBC # BLD AUTO: 4.65 10*6/MM3 (ref 3.77–5.28)
SODIUM SERPL-SCNC: 137 MMOL/L (ref 136–145)
TRIGL SERPL-MCNC: 378 MG/DL (ref 0–150)
TSH SERPL DL<=0.005 MIU/L-ACNC: 3.1 UIU/ML (ref 0.27–4.2)
VLDLC SERPL CALC-MCNC: 69 MG/DL (ref 5–40)
WBC # BLD AUTO: 8.49 10*3/MM3 (ref 3.4–10.8)

## 2021-06-03 RX ORDER — SERTRALINE HYDROCHLORIDE 100 MG/1
200 TABLET, FILM COATED ORAL DAILY
Qty: 30 TABLET | Refills: 5 | Status: SHIPPED | OUTPATIENT
Start: 2021-06-03 | End: 2021-06-10 | Stop reason: SDUPTHER

## 2021-06-10 RX ORDER — SERTRALINE HYDROCHLORIDE 100 MG/1
200 TABLET, FILM COATED ORAL DAILY
Qty: 60 TABLET | Refills: 5 | Status: SHIPPED | OUTPATIENT
Start: 2021-06-10 | End: 2021-11-18

## 2021-06-17 DIAGNOSIS — F98.8 ATTENTION DEFICIT DISORDER, UNSPECIFIED HYPERACTIVITY PRESENCE: ICD-10-CM

## 2021-06-17 DIAGNOSIS — F41.9 ANXIETY: ICD-10-CM

## 2021-06-17 RX ORDER — DEXTROAMPHETAMINE SACCHARATE, AMPHETAMINE ASPARTATE MONOHYDRATE, DEXTROAMPHETAMINE SULFATE AND AMPHETAMINE SULFATE 5; 5; 5; 5 MG/1; MG/1; MG/1; MG/1
20 CAPSULE, EXTENDED RELEASE ORAL EVERY MORNING
Qty: 30 CAPSULE | Refills: 0 | Status: SHIPPED | OUTPATIENT
Start: 2021-06-17 | End: 2021-07-28 | Stop reason: SDUPTHER

## 2021-06-17 RX ORDER — ALPRAZOLAM 0.5 MG/1
0.5 TABLET ORAL 3 TIMES DAILY PRN
Qty: 30 TABLET | Refills: 0 | Status: SHIPPED | OUTPATIENT
Start: 2021-06-17 | End: 2021-07-15

## 2021-06-17 NOTE — TELEPHONE ENCOUNTER
----- Message from Lucy Hernandez MA sent at 6/17/2021  9:15 AM CDT -----  Regarding: FW: Prescription Question  Contact: [phone]    ----- Message -----  From: Marjorie Barnett  Sent: 6/17/2021   9:05 AM CDT  To: [facility]  Subject: Prescription Question                            Dr ziegler and I discussed him refilling adderrall.  With everything going on with mom I probably could use it to help me focus right now.   If you could call it into to [facility] and also with a refill of my Xanax I would greatly appreciate it.   Thank you    Ok for this

## 2021-06-28 RX ORDER — LEVOTHYROXINE SODIUM 125 MCG
TABLET ORAL
Qty: 90 TABLET | Refills: 1 | Status: SHIPPED | OUTPATIENT
Start: 2021-06-28 | End: 2021-07-12

## 2021-07-12 RX ORDER — LEVOTHYROXINE SODIUM 125 MCG
TABLET ORAL
Qty: 90 TABLET | Refills: 1 | Status: SHIPPED | OUTPATIENT
Start: 2021-07-12 | End: 2022-05-26

## 2021-07-12 NOTE — TELEPHONE ENCOUNTER
Requested Prescriptions     Pending Prescriptions Disp Refills   • Synthroid 125 MCG tablet [Pharmacy Med Name: SYNTHROID 125MCG TABLET] 90 tablet 1     Sig: TAKE ONE TABLET DAILY (BRAND NAME PER DOCTOR)

## 2021-07-14 DIAGNOSIS — F41.9 ANXIETY: ICD-10-CM

## 2021-07-15 RX ORDER — ALPRAZOLAM 0.5 MG/1
TABLET ORAL
Qty: 30 TABLET | Refills: 0 | Status: SHIPPED | OUTPATIENT
Start: 2021-07-15 | End: 2021-08-17

## 2021-07-28 DIAGNOSIS — F98.8 ATTENTION DEFICIT DISORDER, UNSPECIFIED HYPERACTIVITY PRESENCE: ICD-10-CM

## 2021-07-28 RX ORDER — DEXTROAMPHETAMINE SACCHARATE, AMPHETAMINE ASPARTATE MONOHYDRATE, DEXTROAMPHETAMINE SULFATE AND AMPHETAMINE SULFATE 5; 5; 5; 5 MG/1; MG/1; MG/1; MG/1
20 CAPSULE, EXTENDED RELEASE ORAL EVERY MORNING
Qty: 30 CAPSULE | Refills: 0 | Status: SHIPPED | OUTPATIENT
Start: 2021-07-28 | End: 2021-09-09 | Stop reason: SDUPTHER

## 2021-08-12 ENCOUNTER — TELEPHONE (OUTPATIENT)
Dept: FAMILY MEDICINE CLINIC | Facility: CLINIC | Age: 46
End: 2021-08-12

## 2021-08-12 DIAGNOSIS — L60.0 INGROWING NAIL: Primary | ICD-10-CM

## 2021-08-12 NOTE — TELEPHONE ENCOUNTER
----- Message from Lucy Hernandez MA sent at 8/12/2021  1:50 PM CDT -----  Regarding: FW: Non-Urgent Medical Question  Contact: [phone]    ----- Message -----  From: Marjorie Barnett  Sent: 8/12/2021  12:53 PM CDT  To: [facility]  Subject: Non-Urgent Medical Question                      Does dr ziegler still remove ingrown toenails?    I believe that's what I have going on.   He did it for me years ago.   Thanks,  Marjorie    Not anymore--I refer to podiatry

## 2021-08-12 NOTE — TELEPHONE ENCOUNTER
----- Message from Lucy Hernandez MA sent at 8/12/2021  3:22 PM CDT -----  Regarding: FW: Non-Urgent Medical Question  Contact: [phone]    ----- Message -----  From: Marjorie Barnett  Sent: 8/12/2021   2:57 PM CDT  To: [facility]  Subject: RE: Non-Urgent Medical Question                  Ok; will he need to take a look at it first?  Or can you just make a referral     ----- Message -----  From: MARYLIN CONTI  Sent: 8/12/21, 2:58 PM  To: Marjorie Barnett  Subject: RE: Non-Urgent Medical Question    Not anymore--I refer to podiatry      ----- Message -----       From:Marjorie Barnett       Sent:8/12/2021  1:53 PM EDT         To:Jacob Ziegler MD    Subject:Non-Urgent Medical Question    Does dr ziegler still remove ingrown toenails?    I believe that's what I have going on.   He did it for me years ago.   Thanks,  Marjorie Powell sent

## 2021-08-13 ENCOUNTER — TELEPHONE (OUTPATIENT)
Dept: FAMILY MEDICINE CLINIC | Facility: CLINIC | Age: 46
End: 2021-08-13

## 2021-08-13 DIAGNOSIS — F41.9 ANXIETY: ICD-10-CM

## 2021-08-13 NOTE — TELEPHONE ENCOUNTER
----- Message from Lata Leon MA sent at 8/13/2021  9:14 AM CDT -----  Regarding: FW: Non-Urgent Medical Question  Contact: [phone]  Do yo know who this is regarding?  ----- Message -----  From: Marjorie Barnett  Sent: 8/13/2021   1:08 AM CDT  To: [facility]  Subject: RE: Non-Urgent Medical Question                  Much thanks   To whom?   Tyrell--can u help her?--thanks  ----- Message -----  From: MARYLIN CONTI  Sent: 8/12/21, 5:11 PM  To: Marjorie Barnett  Subject: RE: Non-Urgent Medical Question    Dr ziegler sent in the referral.      ----- Message -----       From:Marjorie Barnett       Sent:8/12/2021  3:57 PM EDT         To:Jacob Ziegler MD    Subject:RE: Non-Urgent Medical Question    Ok; will he need to take a look at it first?  Or can you just make a referral       ----- Message -----       From:MARYLIN CONTI       Sent:8/12/2021  2:58 PM EDT         To:Marjorie Barnett    Subject:RE: Non-Urgent Medical Question    Not anymore--I refer to podiatry      ----- Message -----       From:Marjorie Barnett       Sent:8/12/2021  1:53 PM EDT         To:Jaocb Ziegler MD    Subject:Non-Urgent Medical Question    Does dr ziegler still remove ingrown toenails?    I believe that's what I have going on.   He did it for me years ago.   Thanks,  Marjorie

## 2021-08-17 RX ORDER — ALPRAZOLAM 0.5 MG/1
TABLET ORAL
Qty: 30 TABLET | Refills: 0 | Status: SHIPPED | OUTPATIENT
Start: 2021-08-17 | End: 2021-09-21 | Stop reason: SDUPTHER

## 2021-09-09 DIAGNOSIS — F98.8 ATTENTION DEFICIT DISORDER, UNSPECIFIED HYPERACTIVITY PRESENCE: ICD-10-CM

## 2021-09-09 RX ORDER — DEXTROAMPHETAMINE SACCHARATE, AMPHETAMINE ASPARTATE MONOHYDRATE, DEXTROAMPHETAMINE SULFATE AND AMPHETAMINE SULFATE 5; 5; 5; 5 MG/1; MG/1; MG/1; MG/1
20 CAPSULE, EXTENDED RELEASE ORAL EVERY MORNING
Qty: 30 CAPSULE | Refills: 0 | Status: SHIPPED | OUTPATIENT
Start: 2021-09-09 | End: 2021-10-19 | Stop reason: SDUPTHER

## 2021-09-21 DIAGNOSIS — F41.9 ANXIETY: ICD-10-CM

## 2021-09-21 RX ORDER — ALPRAZOLAM 0.5 MG/1
0.5 TABLET ORAL 3 TIMES DAILY PRN
Qty: 30 TABLET | Refills: 0 | Status: SHIPPED | OUTPATIENT
Start: 2021-09-21 | End: 2021-10-15 | Stop reason: SDUPTHER

## 2021-10-06 ENCOUNTER — OFFICE VISIT (OUTPATIENT)
Dept: FAMILY MEDICINE CLINIC | Facility: CLINIC | Age: 46
End: 2021-10-06

## 2021-10-06 ENCOUNTER — TELEPHONE (OUTPATIENT)
Dept: FAMILY MEDICINE CLINIC | Facility: CLINIC | Age: 46
End: 2021-10-06

## 2021-10-06 VITALS
SYSTOLIC BLOOD PRESSURE: 132 MMHG | TEMPERATURE: 97.8 F | DIASTOLIC BLOOD PRESSURE: 82 MMHG | WEIGHT: 198 LBS | HEART RATE: 72 BPM | HEIGHT: 64 IN | BODY MASS INDEX: 33.8 KG/M2

## 2021-10-06 DIAGNOSIS — J02.9 ACUTE PHARYNGITIS, UNSPECIFIED ETIOLOGY: Primary | ICD-10-CM

## 2021-10-06 DIAGNOSIS — J01.90 ACUTE NON-RECURRENT SINUSITIS, UNSPECIFIED LOCATION: ICD-10-CM

## 2021-10-06 PROCEDURE — 99213 OFFICE O/P EST LOW 20 MIN: CPT | Performed by: NURSE PRACTITIONER

## 2021-10-06 RX ORDER — AMOXICILLIN AND CLAVULANATE POTASSIUM 875; 125 MG/1; MG/1
1 TABLET, FILM COATED ORAL 2 TIMES DAILY
Qty: 20 TABLET | Refills: 0 | Status: SHIPPED | OUTPATIENT
Start: 2021-10-06 | End: 2021-10-16

## 2021-10-06 RX ORDER — METHYLPREDNISOLONE 4 MG/1
TABLET ORAL
Qty: 1 EACH | Refills: 0 | Status: SHIPPED | OUTPATIENT
Start: 2021-10-06 | End: 2022-02-21

## 2021-10-06 NOTE — PROGRESS NOTES
Subjective   Chief Complaint:  Sore throat, swollen glands    History of Present Illness:  This 46 y.o. female was seen in the office today.  Reports sore throat and swollen glands x2 days.  Reports service some allergy symptoms couple days ago on Monday, reports had a Covid test which came back negative, reports difficulty swallowing and plugged sinuses.    Allergies   Allergen Reactions   • Codeine Itching and GI Intolerance     Chest pain      Current Outpatient Medications on File Prior to Visit   Medication Sig   • albuterol (PROVENTIL HFA;VENTOLIN HFA) 108 (90 BASE) MCG/ACT inhaler Inhale 2 puffs Every 4 (Four) Hours As Needed for wheezing.   • ALPRAZolam (XANAX) 0.5 MG tablet Take 1 tablet by mouth 3 (Three) Times a Day As Needed for Anxiety.   • amphetamine-dextroamphetamine XR (Adderall XR) 20 MG 24 hr capsule Take 1 capsule by mouth Every Morning   • cetirizine (zyrTEC) 10 MG tablet Take 1 tablet by mouth Daily.   • levonorgestrel (Mirena, 52 MG,) 20 MCG/24HR IUD 1 each by Intrauterine route 1 (One) Time. Inserted by Dr Gutiérrez in 2017   • oxybutynin XL (DITROPAN-XL) 10 MG 24 hr tablet Take 1 tablet by mouth Daily.   • sertraline (ZOLOFT) 100 MG tablet Take 2 tablets by mouth Daily. CALL OFFICE FOR APPOINTMENT   • Synthroid 125 MCG tablet TAKE ONE TABLET DAILY (BRAND NAME PER DOCTOR)    • levothyroxine (SYNTHROID, LEVOTHROID) 100 MCG tablet Take 1 tablet by mouth daily     No current facility-administered medications on file prior to visit.      Past Medical, Surgical, Social, and Family History:  Past Medical History:   Diagnosis Date   • Anxiety    • Asthma    • Bunion, right    • Cystitis    • Disease of thyroid gland    • Hypercholesteremia    • Plantar fascia syndrome    • Seasonal allergies    • Urge incontinence      Past Surgical History:   Procedure Laterality Date   •  SECTION      x2   • CHOLECYSTECTOMY       Social History     Socioeconomic History   • Marital status:       "Spouse name: Not on file   • Number of children: Not on file   • Years of education: Not on file   • Highest education level: Not on file   Tobacco Use   • Smoking status: Never Smoker   • Smokeless tobacco: Never Used   Substance and Sexual Activity   • Alcohol use: Yes     Comment: occisonally   • Drug use: No   • Sexual activity: Defer     Family History   Problem Relation Age of Onset   • Diabetes Mother    • Cancer Mother         kidney   • Kidney disease Mother    • Hyperlipidemia Mother    • Cancer Father         skin   • Hyperlipidemia Father    • Breast cancer Neg Hx    • Ovarian cancer Neg Hx    • Colon cancer Neg Hx      Objective   Physical Exam  Vitals reviewed.   Constitutional:       General: She is not in acute distress.     Appearance: Normal appearance.   HENT:      Mouth/Throat:      Pharynx: Oropharyngeal exudate and posterior oropharyngeal erythema present.      Tonsils: Tonsillar exudate present. No tonsillar abscesses. 3+ on the right. 3+ on the left.   Cardiovascular:      Rate and Rhythm: Normal rate and regular rhythm.   Pulmonary:      Effort: Pulmonary effort is normal.      Breath sounds: Normal breath sounds.     /82 (BP Location: Left arm, Patient Position: Sitting, Cuff Size: Adult)   Pulse 72   Temp 97.8 °F (36.6 °C)   Ht 162.6 cm (64\")   Wt 89.8 kg (198 lb)   BMI 33.99 kg/m²     Assessment/Plan   Diagnoses and all orders for this visit:    1. Acute pharyngitis, unspecified etiology (Primary)  -     amoxicillin-clavulanate (Augmentin) 875-125 MG per tablet; Take 1 tablet by mouth 2 (Two) Times a Day for 10 days.  Dispense: 20 tablet; Refill: 0  -     methylPREDNISolone (MEDROL) 4 MG dose pack; Take as directed on package instructions.  Dispense: 1 each; Refill: 0    2. Acute non-recurrent sinusitis, unspecified location  -     amoxicillin-clavulanate (Augmentin) 875-125 MG per tablet; Take 1 tablet by mouth 2 (Two) Times a Day for 10 days.  Dispense: 20 tablet; Refill: " 0  -     methylPREDNISolone (MEDROL) 4 MG dose pack; Take as directed on package instructions.  Dispense: 1 each; Refill: 0    Discussion:  Advised and educated plan of care.      Follow-up:  Return if symptoms worsen or fail to improve.    Electronically signed by HU Emerson, 10/06/21, 9:52 AM CDT.

## 2021-10-06 NOTE — TELEPHONE ENCOUNTER
----- Message from Lucy Hernandez MA sent at 10/6/2021  8:05 AM CDT -----  Regarding: FW: Non-Urgent Medical Question  Contact: [phone]    ----- Message -----  From: Marjorie Barnett  Sent: 10/6/2021   6:13 AM CDT  To: Patty Sarabia [facility]  Subject: Non-Urgent Medical Question                      I would like to get an appt with your office today please.    I had a rapid COVID test done and it was negative.   Main symptoms are sore throat which has gotten worse, some sinus pressure and headache.    If it could be prior to 10:30 that would be ideal for me.  Been using over the counter meds and ibuprofen but as soon as it wears off I'm back to wear I was.  Started on Mon afternoon.   No exposure to anyone with covid.   Thank you    Ok to see me

## 2021-10-15 DIAGNOSIS — F41.9 ANXIETY: ICD-10-CM

## 2021-10-15 RX ORDER — ALPRAZOLAM 0.5 MG/1
0.5 TABLET ORAL 3 TIMES DAILY PRN
Qty: 180 TABLET | Refills: 0 | Status: SHIPPED | OUTPATIENT
Start: 2021-10-15 | End: 2022-03-02 | Stop reason: SDUPTHER

## 2021-10-15 NOTE — TELEPHONE ENCOUNTER
----- Message from Lata Leon MA sent at 10/15/2021 11:39 AM CDT -----  Regarding: FW: Prescription Question  Contact: [phone]  Ok to send 90 days of xanax for Marjorie?  ----- Message -----  From: Marjorie Barnett  Sent: 10/13/2021   9:58 PM CDT  To: Patty [facility]  Subject: Prescription Question                            I meant to ask the other day if I could get a refill longer than a month on the Xanax.   I send a message every month when I am out.  Dr ziegler during pandemic said he would manage meds instead of me going to dr Brock.   I don't think there has been an issue with this but if I could a longer script/more refills it would be more convenient.   Thank you!    I am ok for 60 dasy

## 2021-10-19 DIAGNOSIS — F98.8 ATTENTION DEFICIT DISORDER, UNSPECIFIED HYPERACTIVITY PRESENCE: ICD-10-CM

## 2021-10-19 RX ORDER — DEXTROAMPHETAMINE SACCHARATE, AMPHETAMINE ASPARTATE MONOHYDRATE, DEXTROAMPHETAMINE SULFATE AND AMPHETAMINE SULFATE 5; 5; 5; 5 MG/1; MG/1; MG/1; MG/1
20 CAPSULE, EXTENDED RELEASE ORAL EVERY MORNING
Qty: 30 CAPSULE | Refills: 0 | Status: SHIPPED | OUTPATIENT
Start: 2021-10-19 | End: 2021-11-24

## 2021-11-18 RX ORDER — SERTRALINE HYDROCHLORIDE 100 MG/1
TABLET, FILM COATED ORAL
Qty: 60 TABLET | Refills: 5 | Status: SHIPPED | OUTPATIENT
Start: 2021-11-18 | End: 2022-01-03 | Stop reason: SDUPTHER

## 2021-11-24 DIAGNOSIS — F98.8 ATTENTION DEFICIT DISORDER, UNSPECIFIED HYPERACTIVITY PRESENCE: ICD-10-CM

## 2021-11-24 RX ORDER — DEXTROAMPHETAMINE SACCHARATE, AMPHETAMINE ASPARTATE MONOHYDRATE, DEXTROAMPHETAMINE SULFATE AND AMPHETAMINE SULFATE 5; 5; 5; 5 MG/1; MG/1; MG/1; MG/1
CAPSULE, EXTENDED RELEASE ORAL
Qty: 30 CAPSULE | Refills: 0 | Status: SHIPPED | OUTPATIENT
Start: 2021-11-24 | End: 2021-12-30 | Stop reason: SDUPTHER

## 2021-11-24 NOTE — TELEPHONE ENCOUNTER
LAST FILLED 10/19/2021      Requested Prescriptions     Pending Prescriptions Disp Refills   • amphetamine-dextroamphetamine XR (ADDERALL XR) 20 MG 24 hr capsule [Pharmacy Med Name: AMPHETAMINE/DEXTROAMPHETAMINE 20MG ER CAPSULE ER 24HR] 30 capsule 0     Sig: TAKE ONE CAPSULE EVERY MORNING GENERIC FOR ADDERALL XR

## 2021-12-30 DIAGNOSIS — F98.8 ATTENTION DEFICIT DISORDER, UNSPECIFIED HYPERACTIVITY PRESENCE: ICD-10-CM

## 2021-12-30 RX ORDER — DEXTROAMPHETAMINE SACCHARATE, AMPHETAMINE ASPARTATE MONOHYDRATE, DEXTROAMPHETAMINE SULFATE AND AMPHETAMINE SULFATE 5; 5; 5; 5 MG/1; MG/1; MG/1; MG/1
20 CAPSULE, EXTENDED RELEASE ORAL EVERY MORNING
Qty: 30 CAPSULE | Refills: 0 | Status: SHIPPED | OUTPATIENT
Start: 2021-12-30 | End: 2022-02-01 | Stop reason: SDUPTHER

## 2021-12-30 NOTE — TELEPHONE ENCOUNTER
----- Message from Marjorie Barnett sent at 12/30/2021  5:39 AM CST -----  Regarding: Adderrall refill  Hey!   Just writing to see if I can get my adderrall filled today?   Much thanks!!    Marjorie

## 2022-01-03 ENCOUNTER — TELEPHONE (OUTPATIENT)
Dept: FAMILY MEDICINE CLINIC | Facility: CLINIC | Age: 47
End: 2022-01-03

## 2022-01-03 RX ORDER — SERTRALINE HYDROCHLORIDE 100 MG/1
200 TABLET, FILM COATED ORAL DAILY
Qty: 60 TABLET | Refills: 1 | Status: SHIPPED | OUTPATIENT
Start: 2022-01-03 | End: 2022-07-06

## 2022-01-03 NOTE — TELEPHONE ENCOUNTER
----- Message from Malena Arellano MA sent at 1/3/2022  7:15 AM CST -----  Regarding: FW: Adderrall refill      ----- Message -----  From: Marjorie Barnett  Sent: 12/30/2021  10:32 PM CST  To: Patty Sarabia [facility]  Subject: Adderrall refill                                 Much thanks     I’m embarrassed to ask this and I hope the pharmacy can accommodate but I have lost my last refill of Zoloft.   I think I’m on day 5 or 6 without it which as you know can be challenging.   I kept thinking I would find it in a random spot or come across it and I haven’t.    Can you help with this or what do you recommend?        Ok for refill of zoloft

## 2022-02-01 DIAGNOSIS — F98.8 ATTENTION DEFICIT DISORDER, UNSPECIFIED HYPERACTIVITY PRESENCE: ICD-10-CM

## 2022-02-01 RX ORDER — DEXTROAMPHETAMINE SACCHARATE, AMPHETAMINE ASPARTATE MONOHYDRATE, DEXTROAMPHETAMINE SULFATE AND AMPHETAMINE SULFATE 5; 5; 5; 5 MG/1; MG/1; MG/1; MG/1
20 CAPSULE, EXTENDED RELEASE ORAL EVERY MORNING
Qty: 30 CAPSULE | Refills: 0 | Status: SHIPPED | OUTPATIENT
Start: 2022-02-01 | End: 2022-03-21 | Stop reason: SDUPTHER

## 2022-02-01 NOTE — TELEPHONE ENCOUNTER
----- Message from Marjorie Barnett sent at 2/1/2022  8:18 AM CST -----  Regarding: Adderrall refill  Thank you as always!   If you could please send in a refill for the adderall that would be greatly appreciated as well!   Thank you

## 2022-02-16 DIAGNOSIS — N39.41 URGE INCONTINENCE: ICD-10-CM

## 2022-02-16 RX ORDER — OXYBUTYNIN CHLORIDE 10 MG/1
TABLET, EXTENDED RELEASE ORAL
Qty: 30 TABLET | Refills: 11 | OUTPATIENT
Start: 2022-02-16

## 2022-02-21 ENCOUNTER — TELEPHONE (OUTPATIENT)
Dept: FAMILY MEDICINE CLINIC | Facility: CLINIC | Age: 47
End: 2022-02-21

## 2022-02-21 RX ORDER — METHYLPREDNISOLONE 4 MG/1
TABLET ORAL
Qty: 21 TABLET | Refills: 0 | Status: SHIPPED | OUTPATIENT
Start: 2022-02-21 | End: 2022-08-31

## 2022-02-21 RX ORDER — CLARITHROMYCIN 500 MG/1
500 TABLET, COATED ORAL 2 TIMES DAILY
Qty: 20 TABLET | Refills: 0 | Status: SHIPPED | OUTPATIENT
Start: 2022-02-21 | End: 2022-03-03

## 2022-02-21 NOTE — TELEPHONE ENCOUNTER
----- Message from Marjorie Barnett sent at 2/21/2022 10:15 AM CST -----  Regarding: Adderrall refill  Are you guys in office today?    If so, would you be willing to see what Dr Simms thinks about how to treat what I have got going on?    I’ve been sick since thurs.   2 neg covid tests.    Started out with head congestion, headache and fatigue  and now chest/coughing.    Just using otc meds.    Not seeing a ton of improvement.     Steroid pack?     Much thanks if you guys could call me in something.

## 2022-02-25 ENCOUNTER — TELEPHONE (OUTPATIENT)
Dept: FAMILY MEDICINE CLINIC | Facility: CLINIC | Age: 47
End: 2022-02-25

## 2022-02-25 NOTE — TELEPHONE ENCOUNTER
----- Message from Malena Arellano MA sent at 2/25/2022  9:34 AM CST -----  Regarding: FW: Adderrall refill  Please advise   ----- Message -----  From: Marjorie Barnett  Sent: 2/25/2022   9:32 AM CST  To: Patty Sarabia [facility]  Subject: Adderrall refill                                 Well, thought maybe I should run this by you guys.    I had not improved with meds on board since mon, purchased Sudafed with no relief.   I took another covid test last night and it was positive.   Stay the course on meds he prescribed?     I am vaccinated and boosted and this has knocked me down.     Thanks for giving us a heads up---let it run its course since you are vaccinated ---keep us up to date

## 2022-02-28 ENCOUNTER — TELEPHONE (OUTPATIENT)
Dept: FAMILY MEDICINE CLINIC | Facility: CLINIC | Age: 47
End: 2022-02-28

## 2022-02-28 ENCOUNTER — HOSPITAL ENCOUNTER (OUTPATIENT)
Dept: GENERAL RADIOLOGY | Facility: HOSPITAL | Age: 47
Discharge: HOME | End: 2022-02-28
Admitting: FAMILY MEDICINE

## 2022-02-28 DIAGNOSIS — U07.1 COVID-19: ICD-10-CM

## 2022-02-28 DIAGNOSIS — R05.9 COUGH: Primary | ICD-10-CM

## 2022-02-28 DIAGNOSIS — R05.9 COUGH: ICD-10-CM

## 2022-02-28 PROCEDURE — 71046 X-RAY EXAM CHEST 2 VIEWS: CPT

## 2022-02-28 NOTE — TELEPHONE ENCOUNTER
Caller: Marjorie Barnett    Relationship: Self    Best call back number: [phone]    What is the best time to reach you: ANY     Who are you requesting to speak with (clinical staff, provider,  specific staff member): DR. LUND'S NURSE     What was the call regarding: PATIENT CHOSE NOT TO DISCLOSE INFORMATION, SHE STATED SHE WOULD LIKE A CALL BACK FROM DR. LUND'S NURSE WHEN AVAILABLE    Do you require a callback: YES

## 2022-02-28 NOTE — TELEPHONE ENCOUNTER
Patient stated that she is trying to avoid the er she stated her o2 has been staying in the 90s and wants to know if you are able to order a cxr or something so she does not have to go through er

## 2022-02-28 NOTE — TELEPHONE ENCOUNTER
----- Message from Lucy Hernandez MA sent at 2/28/2022 11:23 AM CST -----  Regarding: FW: Adderrall refill  Send to inez or yi as I am leaving and this needs attention  ----- Message -----  From: Marjorie Barnett  Sent: 2/28/2022  11:20 AM CST  To: Patty Sarabia [facility]  Subject: Adderrall refill                                 I have tried calling the office and it says I’m in the call que but have not heard back.   O2 is fine but the amount of congestion in my chest and what’s draining has barely improved.   Using Sudafed, mucinex, Benadryl.     Finished steroid.    Chest hurting at times and mainly just don’t feel good all over.    Using sons pro air inhaler which I do believe has helped some as well.    Still headache and head congestion as well.      Going to look into some NyQuil cough medicine.         Let her know her mother gave us a heads up that she was feeling bad---do you think u need to go to the er in case its covid pneumonia?

## 2022-03-01 ENCOUNTER — TELEPHONE (OUTPATIENT)
Dept: FAMILY MEDICINE CLINIC | Facility: CLINIC | Age: 47
End: 2022-03-01

## 2022-03-01 RX ORDER — AMOXICILLIN AND CLAVULANATE POTASSIUM 875; 125 MG/1; MG/1
1 TABLET, FILM COATED ORAL 2 TIMES DAILY
Qty: 20 TABLET | Refills: 0 | Status: SHIPPED | OUTPATIENT
Start: 2022-03-01 | End: 2022-08-31

## 2022-03-01 RX ORDER — DEXAMETHASONE 4 MG/1
TABLET ORAL
Qty: 11 TABLET | Refills: 0 | Status: SHIPPED | OUTPATIENT
Start: 2022-03-01 | End: 2022-08-31

## 2022-03-01 NOTE — TELEPHONE ENCOUNTER
----- Message from Lucy Hernandez MA sent at 3/1/2022  9:42 AM CST -----  Regarding: FW: Adderrall refill    ----- Message -----  From: Marjorie Barnett  Sent: 2/28/2022   7:20 PM CST  To: Patty Sarabia [facility]  Subject: Adderrall refill                                 Well, thank you for calling in the order for the chest X-ray.   I’m relieved it’s not pneumonia but still scratching my head.    There’s still lots of pressure, pain, congestion in my head and chest despite lots of different medicines that I’ve tried.      My lungs are fine so I can tough this out but I’d be willing to try a stronger steroid or anything else he recommends.   I feel like im josseliny.   Thank you     Decadron 4mg  Bid x 3 days then 20mg bid x 3 days then 2mg daily x 3 days and add augmentin 875mg bid x 10 days

## 2022-03-02 DIAGNOSIS — F41.9 ANXIETY: ICD-10-CM

## 2022-03-02 RX ORDER — ALPRAZOLAM 0.5 MG/1
0.5 TABLET ORAL 3 TIMES DAILY PRN
Qty: 180 TABLET | Refills: 0 | Status: SHIPPED | OUTPATIENT
Start: 2022-03-02 | End: 2022-05-11

## 2022-03-10 ENCOUNTER — TELEPHONE (OUTPATIENT)
Dept: OBSTETRICS AND GYNECOLOGY | Facility: CLINIC | Age: 47
End: 2022-03-10

## 2022-03-10 ENCOUNTER — OFFICE VISIT (OUTPATIENT)
Dept: FAMILY MEDICINE CLINIC | Facility: CLINIC | Age: 47
End: 2022-03-10

## 2022-03-10 VITALS
WEIGHT: 199.6 LBS | OXYGEN SATURATION: 98 % | RESPIRATION RATE: 20 BRPM | DIASTOLIC BLOOD PRESSURE: 80 MMHG | SYSTOLIC BLOOD PRESSURE: 118 MMHG | HEART RATE: 97 BPM | BODY MASS INDEX: 34.08 KG/M2 | HEIGHT: 64 IN

## 2022-03-10 DIAGNOSIS — U07.1 COVID-19 VIRUS DETECTED: ICD-10-CM

## 2022-03-10 DIAGNOSIS — R79.89 LOW VITAMIN D LEVEL: ICD-10-CM

## 2022-03-10 DIAGNOSIS — E03.9 HYPOTHYROIDISM, UNSPECIFIED TYPE: Primary | ICD-10-CM

## 2022-03-10 PROCEDURE — 99213 OFFICE O/P EST LOW 20 MIN: CPT | Performed by: FAMILY MEDICINE

## 2022-03-10 NOTE — PROGRESS NOTES
Subjective   Marjorie Barnett is a 46 y.o. female.     Chief Complaint   Patient presents with   • Follow-up     Post COVID follow up. Pt states she is still coughing, congested, and shortness of breath.         History of Present Illness     she notes she is feeling better after her recent covid infection---her breathing is better..she was dx with asthma in college---tolerating synthroid wkthout cp or palpitaitons      Current Outpatient Medications:   •  albuterol (PROVENTIL HFA;VENTOLIN HFA) 108 (90 BASE) MCG/ACT inhaler, Inhale 2 puffs Every 4 (Four) Hours As Needed for wheezing., Disp: 8 inhaler, Rfl: 1  •  ALPRAZolam (XANAX) 0.5 MG tablet, Take 1 tablet by mouth 3 (Three) Times a Day As Needed for Anxiety., Disp: 180 tablet, Rfl: 0  •  amphetamine-dextroamphetamine XR (ADDERALL XR) 20 MG 24 hr capsule, Take 1 capsule by mouth Every Morning, Disp: 30 capsule, Rfl: 0  •  cetirizine (zyrTEC) 10 MG tablet, Take 1 tablet by mouth Daily., Disp: 30 tablet, Rfl: 2  •  levonorgestrel (Mirena, 52 MG,) 20 MCG/24HR IUD, 1 each by Intrauterine route 1 (One) Time. Inserted by Dr Gutiérrez in 2017, Disp: , Rfl:   •  oxybutynin XL (DITROPAN-XL) 10 MG 24 hr tablet, Take 1 tablet by mouth Daily., Disp: 30 tablet, Rfl: 11  •  sertraline (ZOLOFT) 100 MG tablet, Take 2 tablets by mouth Daily., Disp: 60 tablet, Rfl: 1  •  Synthroid 125 MCG tablet, TAKE ONE TABLET DAILY (BRAND NAME PER DOCTOR) , Disp: 90 tablet, Rfl: 1  •  amoxicillin-clavulanate (Augmentin) 875-125 MG per tablet, Take 1 tablet by mouth 2 (Two) Times a Day., Disp: 20 tablet, Rfl: 0  •  dexamethasone (DECADRON) 4 MG tablet, 4mg bid x 3 days then 2mg bid x 3 days then then 2mg daily for 3 days, Disp: 11 tablet, Rfl: 0  •  levothyroxine (SYNTHROID, LEVOTHROID) 100 MCG tablet, Take 1 tablet by mouth daily, Disp: 30 tablet, Rfl: 5  •  methylPREDNISolone (MEDROL) 4 MG dose pack, Take as directed on package instructions., Disp: 21 tablet, Rfl: 0  Allergies   Allergen  "Reactions   • Codeine Itching and GI Intolerance     Chest pain       Patient's Body mass index is 34.26 kg/m². indicating that she is obese (BMI >30). Obesity-related health conditions include the following: none. Obesity is unchanged. BMI is is above average; BMI management plan is completed. We discussed portion control and increasing exercise..      Past Medical History:   Diagnosis Date   • Anxiety    • Asthma    • Bunion, right    • Cystitis    • Disease of thyroid gland    • Hypercholesteremia    • Plantar fascia syndrome    • Seasonal allergies    • Urge incontinence      Past Surgical History:   Procedure Laterality Date   •  SECTION      x2   • CHOLECYSTECTOMY         Review of Systems   Constitutional: Negative.    HENT: Negative.    Eyes: Negative.    Respiratory: Positive for cough.    Cardiovascular: Negative.    Gastrointestinal: Negative.    Endocrine: Negative.    Genitourinary: Negative.    Musculoskeletal: Negative.    Skin: Negative.    Allergic/Immunologic: Negative.    Neurological: Negative.    Hematological: Negative.    Psychiatric/Behavioral: Negative.        Objective  /80 (BP Location: Right arm, Patient Position: Sitting, Cuff Size: Adult)   Pulse 97   Resp 20   Ht 162.6 cm (64\") Comment: pt reported.  Wt 90.5 kg (199 lb 9.6 oz)   SpO2 98%   Breastfeeding No   BMI 34.26 kg/m²   Physical Exam  Vitals and nursing note reviewed.   Constitutional:       Appearance: Normal appearance. She is normal weight.   HENT:      Head: Normocephalic and atraumatic.      Right Ear: Ear canal normal.      Nose: Nose normal.      Mouth/Throat:      Mouth: Mucous membranes are moist.   Eyes:      Extraocular Movements: Extraocular movements intact.      Pupils: Pupils are equal, round, and reactive to light.   Cardiovascular:      Rate and Rhythm: Normal rate and regular rhythm.      Pulses: Normal pulses.      Heart sounds: Normal heart sounds.   Pulmonary:      Effort: Pulmonary " effort is normal.      Breath sounds: Normal breath sounds.   Abdominal:      General: Abdomen is flat. Bowel sounds are normal.      Palpations: Abdomen is soft.   Musculoskeletal:         General: Normal range of motion.      Cervical back: Normal range of motion and neck supple.   Skin:     General: Skin is warm and dry.      Capillary Refill: Capillary refill takes less than 2 seconds.   Neurological:      General: No focal deficit present.      Mental Status: She is alert and oriented to person, place, and time. Mental status is at baseline.   Psychiatric:         Mood and Affect: Mood normal.         Behavior: Behavior normal.         Thought Content: Thought content normal.         Judgment: Judgment normal.         Assessment/Plan   Diagnoses and all orders for this visit:    1. Hypothyroidism, unspecified type (Primary)  -     TSH    2. COVID-19 virus detected    3. Low vitamin D level  -     Vitamin D 25 Hydroxy                 Orders Placed This Encounter   Procedures   • TSH     Order Specific Question:   Release to patient     Answer:   Immediate   • Vitamin D 25 Hydroxy     Order Specific Question:   Release to patient     Answer:   Immediate       Follow up: 6 month(s)

## 2022-03-10 NOTE — TELEPHONE ENCOUNTER
Patient annual has been scheduled. Patient is [facility]

## 2022-03-11 DIAGNOSIS — N39.41 URGE INCONTINENCE: ICD-10-CM

## 2022-03-11 LAB
25(OH)D3+25(OH)D2 SERPL-MCNC: 11.4 NG/ML (ref 30–100)
TSH SERPL DL<=0.005 MIU/L-ACNC: 3.24 UIU/ML (ref 0.27–4.2)

## 2022-03-11 RX ORDER — OXYBUTYNIN CHLORIDE 10 MG/1
10 TABLET, EXTENDED RELEASE ORAL DAILY
Qty: 30 TABLET | Refills: 11 | Status: SHIPPED | OUTPATIENT
Start: 2022-03-11 | End: 2023-01-15 | Stop reason: DRUGHIGH

## 2022-03-21 DIAGNOSIS — F98.8 ATTENTION DEFICIT DISORDER, UNSPECIFIED HYPERACTIVITY PRESENCE: ICD-10-CM

## 2022-03-21 RX ORDER — DEXTROAMPHETAMINE SACCHARATE, AMPHETAMINE ASPARTATE MONOHYDRATE, DEXTROAMPHETAMINE SULFATE AND AMPHETAMINE SULFATE 5; 5; 5; 5 MG/1; MG/1; MG/1; MG/1
20 CAPSULE, EXTENDED RELEASE ORAL EVERY MORNING
Qty: 30 CAPSULE | Refills: 0 | Status: SHIPPED | OUTPATIENT
Start: 2022-03-21 | End: 2022-06-17 | Stop reason: SDUPTHER

## 2022-03-21 RX ORDER — ERGOCALCIFEROL 1.25 MG/1
50000 CAPSULE ORAL WEEKLY
Qty: 5 CAPSULE | Refills: 2 | Status: SHIPPED | OUTPATIENT
Start: 2022-03-21

## 2022-03-21 NOTE — TELEPHONE ENCOUNTER
Caller: Marjorie Barnett    Relationship: Self    Best call back number: [phone]    What is the best time to reach you: ANYTIME     Who are you requesting to speak with (clinical staff, provider,  specific staff member): CLINICAL       What was the call regarding:PATIENT REQUESTING A CALL BACK TO DISCUSS SOME MEDICATION QUESTIONS SHE HAS ABOUT THE VITAMIN D3     Do you require a callback:  YES

## 2022-04-21 ENCOUNTER — OFFICE VISIT (OUTPATIENT)
Dept: OBSTETRICS AND GYNECOLOGY | Facility: CLINIC | Age: 47
End: 2022-04-21

## 2022-04-21 VITALS
SYSTOLIC BLOOD PRESSURE: 122 MMHG | BODY MASS INDEX: 33.8 KG/M2 | HEIGHT: 64 IN | DIASTOLIC BLOOD PRESSURE: 80 MMHG | WEIGHT: 198 LBS

## 2022-04-21 DIAGNOSIS — Z12.31 ENCOUNTER FOR SCREENING MAMMOGRAM FOR MALIGNANT NEOPLASM OF BREAST: ICD-10-CM

## 2022-04-21 DIAGNOSIS — Z01.419 WELL WOMAN EXAM WITH ROUTINE GYNECOLOGICAL EXAM: Primary | ICD-10-CM

## 2022-04-21 DIAGNOSIS — E66.9 CLASS 1 OBESITY WITHOUT SERIOUS COMORBIDITY WITH BODY MASS INDEX (BMI) OF 34.0 TO 34.9 IN ADULT, UNSPECIFIED OBESITY TYPE: ICD-10-CM

## 2022-04-21 DIAGNOSIS — N39.46 MIXED INCONTINENCE: ICD-10-CM

## 2022-04-21 DIAGNOSIS — Z12.4 CERVICAL CANCER SCREENING: ICD-10-CM

## 2022-04-21 PROCEDURE — 87624 HPV HI-RISK TYP POOLED RSLT: CPT | Performed by: NURSE PRACTITIONER

## 2022-04-21 PROCEDURE — 87625 HPV TYPES 16 & 18 ONLY: CPT | Performed by: NURSE PRACTITIONER

## 2022-04-21 PROCEDURE — 99214 OFFICE O/P EST MOD 30 MIN: CPT | Performed by: NURSE PRACTITIONER

## 2022-04-21 PROCEDURE — G0123 SCREEN CERV/VAG THIN LAYER: HCPCS | Performed by: NURSE PRACTITIONER

## 2022-04-21 PROCEDURE — 99396 PREV VISIT EST AGE 40-64: CPT | Performed by: NURSE PRACTITIONER

## 2022-04-21 RX ORDER — PHENTERMINE HYDROCHLORIDE 37.5 MG/1
37.5 CAPSULE ORAL EVERY MORNING
Qty: 30 CAPSULE | Refills: 0 | Status: SHIPPED | OUTPATIENT
Start: 2022-04-21 | End: 2022-08-31

## 2022-04-21 NOTE — PROGRESS NOTES
Subjective   Marjorie Barnett is a 46 y.o. female  YOB: 1975        Chief Complaint   Patient presents with   • Gynecologic Exam     Patient is here for annual exam last pap was performed 01/11/21 and was normal but positive HPV. Last mammogram was performed on 11/15/19 and was normal. Patient has no complaints at this time.       Gynecologic Exam  The patient's pertinent negatives include no pelvic pain. Pertinent negatives include no abdominal pain, back pain, constipation, diarrhea, dysuria, fever, frequency, hematuria, nausea, rash, sore throat, urgency or vomiting.       The following portions of the patient's history were reviewed and updated as appropriate: allergies, current medications, past family history, past medical history, past social history, past surgical history, and problem list.    Allergies   Allergen Reactions   • Codeine Itching and GI Intolerance     Chest pain       Past Medical History:   Diagnosis Date   • Anxiety    • Asthma    • Bunion, right    • Cystitis    • Disease of thyroid gland    • Hypercholesteremia    • Plantar fascia syndrome    • Seasonal allergies    • Urge incontinence        Family History   Problem Relation Age of Onset   • Diabetes Mother    • Cancer Mother         kidney   • Kidney disease Mother    • Hyperlipidemia Mother    • Cancer Father         skin   • Hyperlipidemia Father    • Breast cancer Neg Hx    • Ovarian cancer Neg Hx    • Colon cancer Neg Hx        Social History     Socioeconomic History   • Marital status:    Tobacco Use   • Smoking status: Never Smoker   • Smokeless tobacco: Never Used   Substance and Sexual Activity   • Alcohol use: Yes     Comment: occisonally   • Drug use: No   • Sexual activity: Defer         Current Outpatient Medications:   •  albuterol (PROVENTIL HFA;VENTOLIN HFA) 108 (90 BASE) MCG/ACT inhaler, Inhale 2 puffs Every 4 (Four) Hours As Needed for wheezing., Disp: 8 inhaler, Rfl: 1  •  ALPRAZolam (XANAX)  0.5 MG tablet, Take 1 tablet by mouth 3 (Three) Times a Day As Needed for Anxiety., Disp: 180 tablet, Rfl: 0  •  amoxicillin-clavulanate (Augmentin) 875-125 MG per tablet, Take 1 tablet by mouth 2 (Two) Times a Day., Disp: 20 tablet, Rfl: 0  •  amphetamine-dextroamphetamine XR (ADDERALL XR) 20 MG 24 hr capsule, Take 1 capsule by mouth Every Morning, Disp: 30 capsule, Rfl: 0  •  cetirizine (zyrTEC) 10 MG tablet, Take 1 tablet by mouth Daily., Disp: 30 tablet, Rfl: 2  •  dexamethasone (DECADRON) 4 MG tablet, 4mg bid x 3 days then 2mg bid x 3 days then then 2mg daily for 3 days, Disp: 11 tablet, Rfl: 0  •  levonorgestrel (Mirena, 52 MG,) 20 MCG/24HR IUD, 1 each by Intrauterine route 1 (One) Time. Inserted by Dr Gutiérrez in 2017, Disp: , Rfl:   •  levothyroxine (SYNTHROID, LEVOTHROID) 100 MCG tablet, Take 1 tablet by mouth daily, Disp: 30 tablet, Rfl: 5  •  methylPREDNISolone (MEDROL) 4 MG dose pack, Take as directed on package instructions., Disp: 21 tablet, Rfl: 0  •  oxybutynin XL (DITROPAN-XL) 10 MG 24 hr tablet, Take 1 tablet by mouth Daily., Disp: 30 tablet, Rfl: 11  •  sertraline (ZOLOFT) 100 MG tablet, Take 2 tablets by mouth Daily., Disp: 60 tablet, Rfl: 1  •  Synthroid 125 MCG tablet, TAKE ONE TABLET DAILY (BRAND NAME PER DOCTOR) , Disp: 90 tablet, Rfl: 1  •  vitamin D (ERGOCALCIFEROL) 1.25 MG (99817 UT) capsule capsule, Take 1 capsule by mouth 1 (One) Time Per Week., Disp: 5 capsule, Rfl: 2  •  phentermine 37.5 MG capsule, Take 1 capsule by mouth Every Morning., Disp: 30 capsule, Rfl: 0    No LMP recorded. Patient has had an implant.    Sexual History:           Could not be calculated    Past Surgical History:   Procedure Laterality Date   •  SECTION      x2   • CHOLECYSTECTOMY         Review of Systems   Constitutional: Negative for activity change, appetite change, fatigue, fever, unexpected weight gain and unexpected weight loss.   HENT: Negative for congestion, ear pain, hearing loss,  nosebleeds, rhinorrhea, sore throat, tinnitus and trouble swallowing.    Eyes: Negative for blurred vision, pain, discharge, itching and visual disturbance.   Respiratory: Negative for apnea, chest tightness, shortness of breath and wheezing.    Cardiovascular: Negative for chest pain and leg swelling.   Gastrointestinal: Negative for abdominal pain, blood in stool, constipation, diarrhea, nausea, vomiting and GERD.   Endocrine: Negative for heat intolerance, polydipsia and polyuria.   Genitourinary: Negative for breast lump, decreased libido, difficulty urinating, dyspareunia, dysuria, frequency, genital sores, hematuria, menstrual problem, pelvic pain, urgency, urinary incontinence and vaginal pain.        Incontinence   Musculoskeletal: Negative for arthralgias, back pain, joint swelling and myalgias.   Skin: Negative for color change, rash and skin lesions.   Allergic/Immunologic: Negative for environmental allergies, food allergies and immunocompromised state.   Neurological: Negative for dizziness, tremors, seizures, syncope, facial asymmetry, numbness and headache.   Hematological: Negative for adenopathy. Does not bruise/bleed easily.   Psychiatric/Behavioral: Negative for agitation, hallucinations, sleep disturbance, suicidal ideas and depressed mood. The patient is not nervous/anxious.        Objective   Physical Exam  Vitals and nursing note reviewed. Exam conducted with a chaperone present.   Constitutional:       General: She is not in acute distress.     Appearance: She is well-developed. She is not diaphoretic.   HENT:      Head: Normocephalic.      Right Ear: External ear normal.      Left Ear: External ear normal.      Nose: Nose normal.   Eyes:      General: No scleral icterus.        Right eye: No discharge.         Left eye: No discharge.      Conjunctiva/sclera: Conjunctivae normal.      Pupils: Pupils are equal, round, and reactive to light.   Neck:      Thyroid: No thyromegaly.      Vascular:  No carotid bruit.      Trachea: No tracheal deviation.   Cardiovascular:      Rate and Rhythm: Normal rate and regular rhythm.      Heart sounds: Normal heart sounds. No murmur heard.  Pulmonary:      Effort: Pulmonary effort is normal. No respiratory distress.      Breath sounds: Normal breath sounds. No wheezing.   Chest:   Breasts: Breasts are symmetrical.      Right: Normal. No swelling, bleeding, inverted nipple, mass, nipple discharge, skin change, tenderness, axillary adenopathy or supraclavicular adenopathy.      Left: Normal. No swelling, bleeding, inverted nipple, mass, nipple discharge, skin change, tenderness, axillary adenopathy or supraclavicular adenopathy.       Abdominal:      General: There is no distension.      Palpations: Abdomen is soft. There is no mass.      Tenderness: There is no abdominal tenderness. There is no right CVA tenderness, left CVA tenderness or guarding.      Hernia: No hernia is present. There is no hernia in the left inguinal area or right inguinal area.   Genitourinary:     General: Normal vulva.      Exam position: Lithotomy position.      Labia:         Right: No rash, tenderness, lesion or injury.         Left: No rash, tenderness, lesion or injury.       Vagina: Normal. No signs of injury and foreign body. No vaginal discharge, erythema, tenderness or bleeding.      Cervix: Normal.      Uterus: Normal. Not enlarged, not fixed and not tender.       Adnexa: Right adnexa normal and left adnexa normal.        Right: No mass, tenderness or fullness.          Left: No mass, tenderness or fullness.        Rectum: Normal. No mass.      Comments:   BSU normal  Urethral meatus  Normal  Perineum  Normal  Musculoskeletal:         General: No tenderness. Normal range of motion.      Cervical back: Normal range of motion and neck supple.   Lymphadenopathy:      Head:      Right side of head: No submental, submandibular, tonsillar, preauricular, posterior auricular or occipital  "adenopathy.      Left side of head: No submental, submandibular, tonsillar, preauricular, posterior auricular or occipital adenopathy.      Cervical: No cervical adenopathy.      Right cervical: No superficial, deep or posterior cervical adenopathy.     Left cervical: No superficial, deep or posterior cervical adenopathy.      Upper Body:      Right upper body: No supraclavicular, axillary or pectoral adenopathy.      Left upper body: No supraclavicular, axillary or pectoral adenopathy.      Lower Body: No right inguinal adenopathy. No left inguinal adenopathy.   Skin:     General: Skin is warm and dry.      Findings: No bruising, erythema or rash.   Neurological:      Mental Status: She is alert and oriented to person, place, and time.      Coordination: Coordination normal.   Psychiatric:         Mood and Affect: Mood normal.         Behavior: Behavior normal.         Thought Content: Thought content normal.         Judgment: Judgment normal.           Vitals:    04/21/22 0938   BP: 122/80   Weight: 89.8 kg (198 lb)   Height: 162.6 cm (64\")       Diagnoses and all orders for this visit:    1. Well woman exam with routine gynecological exam (Primary)  Comments:  Normal well woman exam.  Cotesting done.  Mammogram ordered.  Orders:  -     Liquid-based Pap Smear, Screening  -     HPV DNA Probe, Direct - ThinPrep Vial, Cervix, Endocervix    2. Cervical cancer screening  Comments:  Cotesting done.  Orders:  -     Liquid-based Pap Smear, Screening  -     HPV DNA Probe, Direct - ThinPrep Vial, Cervix, Endocervix    3. Encounter for screening mammogram for malignant neoplasm of breast  Comments:  Mammogram ordered.  Orders:  -     Mammo screening digital tomosynthesis bilateral w CAD    4. Mixed incontinence  Comments:  Patient has been on Ditropan and states although she has noticed improvement she is still not happy with frequency and severity of stress incontinence.  Discuss    5. Class 1 obesity without serious " comorbidity with body mass index (BMI) of 34.0 to 34.9 in adult, unspecified obesity type  Comments:  Patient wants to discuss weight loss aids.  Discussed options and risk versus benefits.  Discussed diet and exercise at length.  Ron reviewed.  Rx for phentermine sent to pharmacy.  RTO in 4 weeks for follow-up or sooner as needed.  Orders:  -     phentermine 37.5 MG capsule; Take 1 capsule by mouth Every Morning.  Dispense: 30 capsule; Refill: 0        Normal GYN exam. Will have lab work here. Encouraged SBE.  Pt is aware how to do self breast exam and the importance of same. Discussed weight management and importance of maintaining a healthy weight. Discussed Vitamin D intake and the importance of adequate vitamin D for both bone health and a healthy immune system.  Discussed daily exercise and the importance of same in regards to a healthy heart as well as helping to maintain her weight and improving her mental health.  Body mass index is 33.99 kg/m². Colonoscopy is up to date.  Mammogram will be scheduled at [facility]. Pap smear is done per ASCCP guidelines.    Patient's Body mass index is 33.99 kg/m². indicating that she is obese (BMI >30). Obesity-related health conditions include the following: none. Obesity is unchanged. BMI is is above average; BMI management plan is completed. We discussed low calorie, low carb based diet program, portion control and increasing exercise..             Non-Smoker    MyChart Instructions Given

## 2022-04-25 LAB
GEN CATEG CVX/VAG CYTO-IMP: ABNORMAL
HPV I/H RISK 4 DNA CVX QL PROBE+SIG AMP: DETECTED
HPV16 DNA SPEC QL NAA+PROBE: NOT DETECTED
HPV18+45 E6+E7 MRNA CVX QL NAA+PROBE: NOT DETECTED
LAB AP CASE REPORT: ABNORMAL
LAB AP GYN ADDITIONAL INFORMATION: ABNORMAL
PATH INTERP SPEC-IMP: ABNORMAL
STAT OF ADQ CVX/VAG CYTO-IMP: ABNORMAL

## 2022-04-27 ENCOUNTER — TELEPHONE (OUTPATIENT)
Dept: OBSTETRICS AND GYNECOLOGY | Facility: CLINIC | Age: 47
End: 2022-04-27

## 2022-04-27 NOTE — PROGRESS NOTES
Please call patient and answer any questions she has about upcoming colposcopy and get this scheduled.

## 2022-05-11 DIAGNOSIS — F41.9 ANXIETY: ICD-10-CM

## 2022-05-11 RX ORDER — ALPRAZOLAM 0.5 MG/1
0.5 TABLET ORAL 3 TIMES DAILY PRN
Qty: 180 TABLET | Refills: 0 | Status: SHIPPED | OUTPATIENT
Start: 2022-05-11 | End: 2022-06-17 | Stop reason: SDUPTHER

## 2022-05-26 RX ORDER — LEVOTHYROXINE SODIUM 125 MCG
TABLET ORAL
Qty: 90 TABLET | Refills: 1 | Status: SHIPPED | OUTPATIENT
Start: 2022-05-26 | End: 2023-04-05

## 2022-06-17 DIAGNOSIS — F41.9 ANXIETY: ICD-10-CM

## 2022-06-17 DIAGNOSIS — F98.8 ATTENTION DEFICIT DISORDER, UNSPECIFIED HYPERACTIVITY PRESENCE: ICD-10-CM

## 2022-06-17 RX ORDER — ALPRAZOLAM 0.5 MG/1
0.5 TABLET ORAL 3 TIMES DAILY PRN
Qty: 180 TABLET | Refills: 0 | Status: SHIPPED | OUTPATIENT
Start: 2022-06-17 | End: 2022-09-06 | Stop reason: SDUPTHER

## 2022-06-17 RX ORDER — DEXTROAMPHETAMINE SACCHARATE, AMPHETAMINE ASPARTATE MONOHYDRATE, DEXTROAMPHETAMINE SULFATE AND AMPHETAMINE SULFATE 5; 5; 5; 5 MG/1; MG/1; MG/1; MG/1
20 CAPSULE, EXTENDED RELEASE ORAL EVERY MORNING
Qty: 30 CAPSULE | Refills: 0 | Status: SHIPPED | OUTPATIENT
Start: 2022-06-17 | End: 2022-08-18 | Stop reason: SDUPTHER

## 2022-06-17 NOTE — TELEPHONE ENCOUNTER
Caller: Marjorie Barnett    Relationship to patient: Self    Best call back number: [phone]    Patient is needing refills on medication. Her mother passed away this week.       [facility] - [place], [place] - [address] - [phone] [facility] [phone] FX

## 2022-07-06 RX ORDER — SERTRALINE HYDROCHLORIDE 100 MG/1
200 TABLET, FILM COATED ORAL DAILY
Qty: 60 TABLET | Refills: 5 | Status: SHIPPED | OUTPATIENT
Start: 2022-07-06 | End: 2023-01-25

## 2022-08-09 ENCOUNTER — TELEPHONE (OUTPATIENT)
Dept: OBSTETRICS AND GYNECOLOGY | Facility: CLINIC | Age: 47
End: 2022-08-09

## 2022-08-09 NOTE — TELEPHONE ENCOUNTER
Returned bertha call regarding getting her Colop rescheduled. No answer I let a messages for her to call the office back to get this rescheduled

## 2022-08-18 DIAGNOSIS — F98.8 ATTENTION DEFICIT DISORDER, UNSPECIFIED HYPERACTIVITY PRESENCE: ICD-10-CM

## 2022-08-18 RX ORDER — DEXTROAMPHETAMINE SACCHARATE, AMPHETAMINE ASPARTATE MONOHYDRATE, DEXTROAMPHETAMINE SULFATE AND AMPHETAMINE SULFATE 5; 5; 5; 5 MG/1; MG/1; MG/1; MG/1
20 CAPSULE, EXTENDED RELEASE ORAL EVERY MORNING
Qty: 30 CAPSULE | Refills: 0 | Status: SHIPPED | OUTPATIENT
Start: 2022-08-18 | End: 2022-08-31

## 2022-08-18 NOTE — TELEPHONE ENCOUNTER
Caller: Marjorie Barnett    Relationship: Self    Best call back number: [phone]    Requested Prescriptions:   Requested Prescriptions     Pending Prescriptions Disp Refills   • amphetamine-dextroamphetamine XR (ADDERALL XR) 20 MG 24 hr capsule 30 capsule 0     Sig: Take 1 capsule by mouth Every Morning        Pharmacy where request should be sent: [facility] - [place], [place] - [address] [facility] [phone] [facility] [phone] FX     Does the patient have less than a 3 day supply:  [x] Yes  [] No    Kathryn Mendes Rep   08/18/22 08:43 CDT

## 2022-08-31 ENCOUNTER — OFFICE VISIT (OUTPATIENT)
Dept: OBSTETRICS AND GYNECOLOGY | Facility: CLINIC | Age: 47
End: 2022-08-31

## 2022-08-31 VITALS
BODY MASS INDEX: 32.1 KG/M2 | WEIGHT: 188 LBS | DIASTOLIC BLOOD PRESSURE: 84 MMHG | SYSTOLIC BLOOD PRESSURE: 128 MMHG | HEIGHT: 64 IN

## 2022-08-31 DIAGNOSIS — R87.610 ATYPICAL SQUAMOUS CELL CHANGES OF UNDETERMINED SIGNIFICANCE (ASCUS) ON CERVICAL CYTOLOGY WITH POSITIVE HIGH RISK HUMAN PAPILLOMA VIRUS (HPV): Primary | ICD-10-CM

## 2022-08-31 DIAGNOSIS — R87.810 ATYPICAL SQUAMOUS CELL CHANGES OF UNDETERMINED SIGNIFICANCE (ASCUS) ON CERVICAL CYTOLOGY WITH POSITIVE HIGH RISK HUMAN PAPILLOMA VIRUS (HPV): Primary | ICD-10-CM

## 2022-08-31 LAB
B-HCG UR QL: NEGATIVE
EXPIRATION DATE: NORMAL
INTERNAL NEGATIVE CONTROL: NEGATIVE
INTERNAL POSITIVE CONTROL: POSITIVE
Lab: NORMAL

## 2022-08-31 PROCEDURE — 81025 URINE PREGNANCY TEST: CPT | Performed by: OBSTETRICS & GYNECOLOGY

## 2022-08-31 PROCEDURE — 88305 TISSUE EXAM BY PATHOLOGIST: CPT | Performed by: OBSTETRICS & GYNECOLOGY

## 2022-08-31 PROCEDURE — 57456 ENDOCERV CURETTAGE W/SCOPE: CPT | Performed by: OBSTETRICS & GYNECOLOGY

## 2022-08-31 NOTE — PROGRESS NOTES
Colposcopy    Date of procedure:  8/31/2022    Risks and benefits discussed? yes  All questions answered? yes  Consents given by the patient  Written consent obtained? yes    Local anesthesia used:  no    Pre-op indication: ASCUS with POSITIVE high risk HPV  Procedure documentation:    The cervix was initially viewed colposcopically through a green filter.  The cervix was next bathed in acetic acid.   The findings were as follows:  Urine pregnancy test is negative      The transformation zone was able to be seen adequately.    No visible lesions    Ectocervical biopsies were not performed    An ECC was performed.    IUD string noted    Patient tolerated the procedure well.      Colposcopic Impression: 1. Adequate colposcopy  2. Colposcopic findings are consistent with PAP       Plan: Will base further treatment on pathology results  Post biopsy instructions given to patient.  Specimens labelled and sent to pathology.      This note was electronically signed.    Roni Looney MD  August 31, 2022

## 2022-09-02 LAB
CYTO UR: NORMAL
LAB AP CASE REPORT: NORMAL
Lab: NORMAL
PATH REPORT.FINAL DX SPEC: NORMAL
PATH REPORT.GROSS SPEC: NORMAL

## 2022-09-06 DIAGNOSIS — F41.9 ANXIETY: ICD-10-CM

## 2022-09-06 RX ORDER — ALPRAZOLAM 0.5 MG/1
0.5 TABLET ORAL 3 TIMES DAILY PRN
Qty: 180 TABLET | Refills: 0 | Status: SHIPPED | OUTPATIENT
Start: 2022-09-06 | End: 2022-11-22 | Stop reason: SDUPTHER

## 2022-09-07 ENCOUNTER — OFFICE VISIT (OUTPATIENT)
Dept: FAMILY MEDICINE CLINIC | Facility: CLINIC | Age: 47
End: 2022-09-07

## 2022-09-07 VITALS
SYSTOLIC BLOOD PRESSURE: 142 MMHG | OXYGEN SATURATION: 98 % | DIASTOLIC BLOOD PRESSURE: 80 MMHG | HEIGHT: 64 IN | BODY MASS INDEX: 32.44 KG/M2 | HEART RATE: 103 BPM | WEIGHT: 190 LBS

## 2022-09-07 DIAGNOSIS — I10 ESSENTIAL HYPERTENSION: Primary | ICD-10-CM

## 2022-09-07 DIAGNOSIS — F33.9 RECURRENT MAJOR DEPRESSIVE DISORDER, REMISSION STATUS UNSPECIFIED: ICD-10-CM

## 2022-09-07 DIAGNOSIS — E03.9 HYPOTHYROIDISM, UNSPECIFIED TYPE: ICD-10-CM

## 2022-09-07 PROCEDURE — 99213 OFFICE O/P EST LOW 20 MIN: CPT | Performed by: FAMILY MEDICINE

## 2022-09-19 ENCOUNTER — TELEPHONE (OUTPATIENT)
Dept: FAMILY MEDICINE CLINIC | Facility: CLINIC | Age: 47
End: 2022-09-19

## 2022-09-19 RX ORDER — AMOXICILLIN AND CLAVULANATE POTASSIUM 875; 125 MG/1; MG/1
1 TABLET, FILM COATED ORAL 2 TIMES DAILY
Qty: 20 TABLET | Refills: 0 | Status: SHIPPED | OUTPATIENT
Start: 2022-09-19 | End: 2022-09-29

## 2022-09-19 NOTE — TELEPHONE ENCOUNTER
Medication sent to pharmacy. Attempted to reach patient to notify and set up decadron shot. Left vm. [employer] message also sent to patient

## 2022-09-19 NOTE — TELEPHONE ENCOUNTER
----- Message from Malena Arellano MA sent at 9/19/2022  1:40 PM CDT -----  Regarding: FW: please call the office    ----- Message -----  From: Marjorie Barnett  Sent: 9/19/2022  12:06 PM CDT  To: Patty Sarabia [facility]  Subject: please call the office                           I have had a massive head cold/sinus infection I’m guessing since last mon.  I stayed home that day.   Tested negative for Covid twice.    If anything has gotten worse than better.    My throat was raw and I had trouble swallowing, pressure in head, ears hurting.   I treated with pseudofed, Tylenol cold and flu.   Been using Graysons inhaler some.    Wasn’t sure what you were thinking if I should try an antibiotic or steroid?     I’d even be willing to come get a steroid shot.   I’ve battled this for a week and I’m miserable.   Throat is actually better.   I’m still just battling all of that congestion in my eyes and head.

## 2022-09-20 ENCOUNTER — CLINICAL SUPPORT (OUTPATIENT)
Dept: FAMILY MEDICINE CLINIC | Facility: CLINIC | Age: 47
End: 2022-09-20

## 2022-09-20 DIAGNOSIS — J01.90 ACUTE SINUSITIS, RECURRENCE NOT SPECIFIED, UNSPECIFIED LOCATION: Primary | ICD-10-CM

## 2022-09-20 PROCEDURE — 96372 THER/PROPH/DIAG INJ SC/IM: CPT | Performed by: FAMILY MEDICINE

## 2022-09-20 RX ORDER — DEXAMETHASONE SODIUM PHOSPHATE 4 MG/ML
4 INJECTION, SOLUTION INTRA-ARTICULAR; INTRALESIONAL; INTRAMUSCULAR; INTRAVENOUS; SOFT TISSUE ONCE
Status: COMPLETED | OUTPATIENT
Start: 2022-09-20 | End: 2022-09-20

## 2022-09-20 RX ADMIN — DEXAMETHASONE SODIUM PHOSPHATE 4 MG: 4 INJECTION, SOLUTION INTRA-ARTICULAR; INTRALESIONAL; INTRAMUSCULAR; INTRAVENOUS; SOFT TISSUE at 08:34

## 2022-09-20 NOTE — PROGRESS NOTES
Patient presented to office for decadron shot. Injection given in Left ventralgluteal. Patient tolerated well with no reaction.

## 2022-09-22 DIAGNOSIS — F98.8 ATTENTION DEFICIT DISORDER (ADD) WITHOUT HYPERACTIVITY: Primary | ICD-10-CM

## 2022-09-22 RX ORDER — DEXTROAMPHETAMINE SACCHARATE, AMPHETAMINE ASPARTATE MONOHYDRATE, DEXTROAMPHETAMINE SULFATE AND AMPHETAMINE SULFATE 5; 5; 5; 5 MG/1; MG/1; MG/1; MG/1
20 CAPSULE, EXTENDED RELEASE ORAL EVERY MORNING
Qty: 30 CAPSULE | Refills: 0 | Status: SHIPPED | OUTPATIENT
Start: 2022-09-22 | End: 2022-11-03 | Stop reason: SDUPTHER

## 2022-09-28 ENCOUNTER — TELEPHONE (OUTPATIENT)
Dept: FAMILY MEDICINE CLINIC | Facility: CLINIC | Age: 47
End: 2022-09-28

## 2022-09-28 LAB
ALBUMIN SERPL-MCNC: 4.4 G/DL (ref 3.5–5.2)
ALBUMIN/GLOB SERPL: 2 G/DL
ALP SERPL-CCNC: 94 U/L (ref 39–117)
ALT SERPL-CCNC: 18 U/L (ref 1–33)
AST SERPL-CCNC: 15 U/L (ref 1–32)
BILIRUB SERPL-MCNC: 0.2 MG/DL (ref 0–1.2)
BUN SERPL-MCNC: 13 MG/DL (ref 6–20)
BUN/CREAT SERPL: 17.1 (ref 7–25)
CALCIUM SERPL-MCNC: 9.3 MG/DL (ref 8.6–10.5)
CHLORIDE SERPL-SCNC: 107 MMOL/L (ref 98–107)
CHOLEST SERPL-MCNC: 259 MG/DL (ref 0–200)
CHOLEST/HDLC SERPL: 6.64 {RATIO}
CO2 SERPL-SCNC: 27.1 MMOL/L (ref 22–29)
CREAT SERPL-MCNC: 0.76 MG/DL (ref 0.57–1)
EGFRCR SERPLBLD CKD-EPI 2021: 98.6 ML/MIN/1.73
GLOBULIN SER CALC-MCNC: 2.2 GM/DL
GLUCOSE SERPL-MCNC: 96 MG/DL (ref 65–99)
HDLC SERPL-MCNC: 39 MG/DL (ref 40–60)
LDLC SERPL CALC-MCNC: 162 MG/DL (ref 0–100)
POTASSIUM SERPL-SCNC: 4.6 MMOL/L (ref 3.5–5.2)
PROT SERPL-MCNC: 6.6 G/DL (ref 6–8.5)
SODIUM SERPL-SCNC: 141 MMOL/L (ref 136–145)
TRIGL SERPL-MCNC: 305 MG/DL (ref 0–150)
TSH SERPL DL<=0.005 MIU/L-ACNC: 0.47 UIU/ML (ref 0.27–4.2)
VLDLC SERPL CALC-MCNC: 58 MG/DL (ref 5–40)

## 2022-09-28 RX ORDER — ATORVASTATIN CALCIUM 40 MG/1
40 TABLET, FILM COATED ORAL NIGHTLY
Qty: 30 TABLET | Refills: 4 | Status: SHIPPED | OUTPATIENT
Start: 2022-09-28 | End: 2023-02-22

## 2022-09-28 NOTE — TELEPHONE ENCOUNTER
----- Message from Malena Arellano MA sent at 9/28/2022  8:22 AM CDT -----  Regarding: FW: Question regarding COMPREHENSIVE METABOLIC PANEL    ----- Message -----  From: Marjorie Barnett  Sent: 9/28/2022   8:05 AM CDT  To: Patty Sarabia [facility]  Subject: Question regarding COMPREHENSIVE METABOLIC P#    Yes, I am willing to do that.   I know Statin drugs can be hard to take but I’ll try it.   I am trying very hard to get back on exercising and eating better.        lipitor 40mg q hs #30,4rf

## 2022-11-03 DIAGNOSIS — F98.8 ATTENTION DEFICIT DISORDER (ADD) WITHOUT HYPERACTIVITY: ICD-10-CM

## 2022-11-03 RX ORDER — DEXTROAMPHETAMINE SACCHARATE, AMPHETAMINE ASPARTATE MONOHYDRATE, DEXTROAMPHETAMINE SULFATE AND AMPHETAMINE SULFATE 5; 5; 5; 5 MG/1; MG/1; MG/1; MG/1
20 CAPSULE, EXTENDED RELEASE ORAL EVERY MORNING
Qty: 30 CAPSULE | Refills: 0 | Status: SHIPPED | OUTPATIENT
Start: 2022-11-03 | End: 2022-12-01 | Stop reason: SDUPTHER

## 2022-11-18 ENCOUNTER — PATIENT MESSAGE (OUTPATIENT)
Dept: OBSTETRICS AND GYNECOLOGY | Facility: CLINIC | Age: 47
End: 2022-11-18

## 2022-11-22 DIAGNOSIS — F41.9 ANXIETY: ICD-10-CM

## 2022-11-22 RX ORDER — ALPRAZOLAM 0.5 MG/1
0.5 TABLET ORAL 3 TIMES DAILY PRN
Qty: 180 TABLET | Refills: 0 | Status: SHIPPED | OUTPATIENT
Start: 2022-11-22 | End: 2023-01-25 | Stop reason: SDUPTHER

## 2022-12-01 DIAGNOSIS — F98.8 ATTENTION DEFICIT DISORDER (ADD) WITHOUT HYPERACTIVITY: ICD-10-CM

## 2022-12-01 RX ORDER — DEXTROAMPHETAMINE SACCHARATE, AMPHETAMINE ASPARTATE MONOHYDRATE, DEXTROAMPHETAMINE SULFATE AND AMPHETAMINE SULFATE 5; 5; 5; 5 MG/1; MG/1; MG/1; MG/1
20 CAPSULE, EXTENDED RELEASE ORAL EVERY MORNING
Qty: 30 CAPSULE | Refills: 0 | Status: SHIPPED | OUTPATIENT
Start: 2022-12-01 | End: 2022-12-27

## 2022-12-26 DIAGNOSIS — F98.8 ATTENTION DEFICIT DISORDER (ADD) WITHOUT HYPERACTIVITY: ICD-10-CM

## 2022-12-27 RX ORDER — DEXTROAMPHETAMINE SACCHARATE, AMPHETAMINE ASPARTATE MONOHYDRATE, DEXTROAMPHETAMINE SULFATE AND AMPHETAMINE SULFATE 5; 5; 5; 5 MG/1; MG/1; MG/1; MG/1
20 CAPSULE, EXTENDED RELEASE ORAL EVERY MORNING
Qty: 30 CAPSULE | Refills: 0 | Status: SHIPPED | OUTPATIENT
Start: 2022-12-27 | End: 2023-01-25

## 2023-01-04 DIAGNOSIS — F98.8 ATTENTION DEFICIT DISORDER (ADD) WITHOUT HYPERACTIVITY: ICD-10-CM

## 2023-01-04 RX ORDER — DEXTROAMPHETAMINE SACCHARATE, AMPHETAMINE ASPARTATE MONOHYDRATE, DEXTROAMPHETAMINE SULFATE AND AMPHETAMINE SULFATE 5; 5; 5; 5 MG/1; MG/1; MG/1; MG/1
20 CAPSULE, EXTENDED RELEASE ORAL EVERY MORNING
Qty: 30 CAPSULE | Refills: 0 | OUTPATIENT
Start: 2023-01-04

## 2023-01-15 DIAGNOSIS — N39.46 MIXED INCONTINENCE: Primary | ICD-10-CM

## 2023-01-15 RX ORDER — OXYBUTYNIN CHLORIDE 15 MG/1
15 TABLET, EXTENDED RELEASE ORAL DAILY
Qty: 30 TABLET | Refills: 0 | Status: SHIPPED | OUTPATIENT
Start: 2023-01-15 | End: 2023-02-22

## 2023-01-25 DIAGNOSIS — F98.8 ATTENTION DEFICIT DISORDER (ADD) WITHOUT HYPERACTIVITY: ICD-10-CM

## 2023-01-25 DIAGNOSIS — F41.9 ANXIETY: ICD-10-CM

## 2023-01-25 RX ORDER — ALPRAZOLAM 0.5 MG/1
0.5 TABLET ORAL 3 TIMES DAILY PRN
Qty: 180 TABLET | Refills: 0 | Status: SHIPPED | OUTPATIENT
Start: 2023-01-25 | End: 2023-02-22

## 2023-01-25 RX ORDER — SERTRALINE HYDROCHLORIDE 100 MG/1
200 TABLET, FILM COATED ORAL DAILY
Qty: 60 TABLET | Refills: 5 | Status: SHIPPED | OUTPATIENT
Start: 2023-01-25

## 2023-01-25 RX ORDER — DEXTROAMPHETAMINE SACCHARATE, AMPHETAMINE ASPARTATE MONOHYDRATE, DEXTROAMPHETAMINE SULFATE AND AMPHETAMINE SULFATE 5; 5; 5; 5 MG/1; MG/1; MG/1; MG/1
20 CAPSULE, EXTENDED RELEASE ORAL EVERY MORNING
Qty: 30 CAPSULE | Refills: 0 | Status: SHIPPED | OUTPATIENT
Start: 2023-01-25 | End: 2023-02-22

## 2023-02-22 DIAGNOSIS — F98.8 ATTENTION DEFICIT DISORDER (ADD) WITHOUT HYPERACTIVITY: ICD-10-CM

## 2023-02-22 DIAGNOSIS — N39.46 MIXED INCONTINENCE: ICD-10-CM

## 2023-02-22 DIAGNOSIS — F41.9 ANXIETY: ICD-10-CM

## 2023-02-22 RX ORDER — DEXTROAMPHETAMINE SACCHARATE, AMPHETAMINE ASPARTATE MONOHYDRATE, DEXTROAMPHETAMINE SULFATE AND AMPHETAMINE SULFATE 5; 5; 5; 5 MG/1; MG/1; MG/1; MG/1
20 CAPSULE, EXTENDED RELEASE ORAL EVERY MORNING
Qty: 30 CAPSULE | Refills: 0 | Status: SHIPPED | OUTPATIENT
Start: 2023-02-22 | End: 2023-04-05

## 2023-02-22 RX ORDER — ALPRAZOLAM 0.5 MG/1
0.5 TABLET ORAL 3 TIMES DAILY PRN
Qty: 180 TABLET | Refills: 0 | Status: SHIPPED | OUTPATIENT
Start: 2023-02-22 | End: 2023-03-22 | Stop reason: SDUPTHER

## 2023-02-22 RX ORDER — ATORVASTATIN CALCIUM 40 MG/1
40 TABLET, FILM COATED ORAL NIGHTLY
Qty: 30 TABLET | Refills: 4 | Status: SHIPPED | OUTPATIENT
Start: 2023-02-22

## 2023-02-22 RX ORDER — OXYBUTYNIN CHLORIDE 15 MG/1
15 TABLET, EXTENDED RELEASE ORAL DAILY
Qty: 30 TABLET | Refills: 2 | Status: SHIPPED | OUTPATIENT
Start: 2023-02-22

## 2023-03-09 ENCOUNTER — OFFICE VISIT (OUTPATIENT)
Dept: FAMILY MEDICINE CLINIC | Facility: CLINIC | Age: 48
End: 2023-03-09
Payer: COMMERCIAL

## 2023-03-09 VITALS
OXYGEN SATURATION: 96 % | SYSTOLIC BLOOD PRESSURE: 138 MMHG | HEART RATE: 105 BPM | HEIGHT: 64 IN | DIASTOLIC BLOOD PRESSURE: 74 MMHG | WEIGHT: 178 LBS | BODY MASS INDEX: 30.39 KG/M2

## 2023-03-09 DIAGNOSIS — F33.9 RECURRENT MAJOR DEPRESSIVE DISORDER, REMISSION STATUS UNSPECIFIED: ICD-10-CM

## 2023-03-09 DIAGNOSIS — E03.9 HYPOTHYROIDISM, UNSPECIFIED TYPE: Primary | ICD-10-CM

## 2023-03-09 DIAGNOSIS — E78.2 MIXED HYPERLIPIDEMIA: ICD-10-CM

## 2023-03-09 DIAGNOSIS — L98.9 SKIN LESION: ICD-10-CM

## 2023-03-09 PROCEDURE — 99213 OFFICE O/P EST LOW 20 MIN: CPT | Performed by: FAMILY MEDICINE

## 2023-03-09 NOTE — PROGRESS NOTES
Subjective   Marjorie Barnett is a 47 y.o. female.     Chief Complaint   Patient presents with   • Hypertension   • Hypothyroidism       History of Present Illness     she is tolerating synthrodiwthout heat or cold tinancnes---she is toerinag statin witout myagis ..--her add is stablw iwith meds--she has a nonhealing skin lesion over the left upper cheek--she notes it gets ulcerated      Current Outpatient Medications:   •  albuterol (PROVENTIL HFA;VENTOLIN HFA) 108 (90 BASE) MCG/ACT inhaler, Inhale 2 puffs Every 4 (Four) Hours As Needed for wheezing., Disp: 8 inhaler, Rfl: 1  •  ALPRAZolam (XANAX) 0.5 MG tablet, TAKE 1 TABLET BY MOUTH 3 (THREE) TIMES A DAY AS NEEDED FOR ANXIETY., Disp: 180 tablet, Rfl: 0  •  amphetamine-dextroamphetamine XR (ADDERALL XR) 20 MG 24 hr capsule, TAKE 1 CAPSULE BY MOUTH EVERY MORNING, Disp: 30 capsule, Rfl: 0  •  atorvastatin (LIPITOR) 40 MG tablet, TAKE 1 TABLET BY MOUTH EVERY NIGHT., Disp: 30 tablet, Rfl: 4  •  cetirizine (zyrTEC) 10 MG tablet, Take 1 tablet by mouth Daily., Disp: 30 tablet, Rfl: 2  •  levonorgestrel (Mirena, 52 MG,) 20 MCG/24HR IUD, 1 each by Intrauterine route 1 (One) Time. Inserted by Dr Gutiérrez in 2017, Disp: , Rfl:   •  oxybutynin XL (DITROPAN XL) 15 MG 24 hr tablet, TAKE 1 TABLET BY MOUTH DAILY., Disp: 30 tablet, Rfl: 2  •  sertraline (ZOLOFT) 100 MG tablet, TAKE 2 TABLETS BY MOUTH DAILY., Disp: 60 tablet, Rfl: 5  •  Synthroid 125 MCG tablet, TAKE ONE TABLET DAILY (BRAND NAME PER DOCTOR), Disp: 90 tablet, Rfl: 1  •  vitamin D (ERGOCALCIFEROL) 1.25 MG (56706 UT) capsule capsule, Take 1 capsule by mouth 1 (One) Time Per Week., Disp: 5 capsule, Rfl: 2  Allergies   Allergen Reactions   • Codeine Itching and GI Intolerance     Chest pain       BMI is >= 30 and <35. (Class 1 Obesity). The following options were offered after discussion;: nutrition counseling/recommendations      Past Medical History:   Diagnosis Date   • Abnormal Pap smear of cervix    • Anxiety   "  • Asthma    • Bunion, right    • Cystitis    • Disease of thyroid gland    • Hypercholesteremia    • Plantar fascia syndrome    • Seasonal allergies    • Urge incontinence      Past Surgical History:   Procedure Laterality Date   •  SECTION      x2   • CHOLECYSTECTOMY         Review of Systems   Constitutional: Negative.    HENT: Negative.    Eyes: Negative.    Respiratory: Negative.    Cardiovascular: Negative.    Gastrointestinal: Negative.    Endocrine: Negative.    Genitourinary: Negative.    Musculoskeletal: Negative.    Skin: Negative.    Allergic/Immunologic: Negative.    Neurological: Negative.    Hematological: Negative.    Psychiatric/Behavioral: Negative.        Objective  /74   Pulse 105   Ht 162.6 cm (64\")   Wt 80.7 kg (178 lb)   SpO2 96%   BMI 30.55 kg/m²   Physical Exam  Vitals and nursing note reviewed.   Constitutional:       Appearance: She is normal weight.   HENT:      Head: Normocephalic and atraumatic.      Nose: Nose normal.      Mouth/Throat:      Mouth: Mucous membranes are moist.   Eyes:      Extraocular Movements: Extraocular movements intact.      Pupils: Pupils are equal, round, and reactive to light.   Cardiovascular:      Rate and Rhythm: Normal rate and regular rhythm.      Pulses: Normal pulses.      Heart sounds: Normal heart sounds.   Pulmonary:      Effort: Pulmonary effort is normal.      Breath sounds: Normal breath sounds.   Abdominal:      General: Abdomen is flat. Bowel sounds are normal.      Palpations: Abdomen is soft.   Musculoskeletal:         General: Normal range of motion.      Cervical back: Normal range of motion and neck supple.   Skin:     General: Skin is warm and dry.      Capillary Refill: Capillary refill takes less than 2 seconds.   Neurological:      General: No focal deficit present.      Mental Status: She is alert and oriented to person, place, and time. Mental status is at baseline.   Psychiatric:         Mood and Affect: Mood " normal.     noed 0.5cm ulcerated lesion over the left upper cheek under the left eye     Assessment & Plan   Diagnoses and all orders for this visit:    1. Hypothyroidism, unspecified type (Primary)  -     CBC & Differential  -     Comprehensive metabolic panel  -     Lipid Panel With / Chol / HDL Ratio  -     TSH    2. Recurrent major depressive disorder, remission status unspecified (HCC)    3. Mixed hyperlipidemia  -     CBC & Differential  -     Comprehensive metabolic panel  -     Lipid Panel With / Chol / HDL Ratio  -     TSH    4. Skin lesion  -     Ambulatory Referral to ENT (Otolaryngology)     she leonor moody bp and monitor for myalgias             Orders Placed This Encounter   Procedures   • Comprehensive metabolic panel     Order Specific Question:   Release to patient     Answer:   Routine Release   • Lipid Panel With / Chol / HDL Ratio     Order Specific Question:   Release to patient     Answer:   Routine Release   • TSH     Order Specific Question:   Release to patient     Answer:   Routine Release   • Ambulatory Referral to ENT (Otolaryngology)     Referral Priority:   Routine     Referral Type:   Consultation     Referral Reason:   Specialty Services Required     Referred to Provider:   Allen Colbert MD     Requested Specialty:   Otolaryngology     Number of Visits Requested:   1   • CBC & Differential       Follow up: 6 month(s)

## 2023-03-22 DIAGNOSIS — F41.9 ANXIETY: ICD-10-CM

## 2023-03-23 RX ORDER — ALPRAZOLAM 0.5 MG/1
0.5 TABLET ORAL 3 TIMES DAILY PRN
Qty: 180 TABLET | Refills: 0 | Status: SHIPPED | OUTPATIENT
Start: 2023-03-23

## 2023-04-05 DIAGNOSIS — F98.8 ATTENTION DEFICIT DISORDER (ADD) WITHOUT HYPERACTIVITY: ICD-10-CM

## 2023-04-05 RX ORDER — LEVOTHYROXINE SODIUM 125 MCG
TABLET ORAL
Qty: 90 TABLET | Refills: 1 | Status: SHIPPED | OUTPATIENT
Start: 2023-04-05

## 2023-04-05 RX ORDER — DEXTROAMPHETAMINE SACCHARATE, AMPHETAMINE ASPARTATE MONOHYDRATE, DEXTROAMPHETAMINE SULFATE AND AMPHETAMINE SULFATE 5; 5; 5; 5 MG/1; MG/1; MG/1; MG/1
20 CAPSULE, EXTENDED RELEASE ORAL EVERY MORNING
Qty: 30 CAPSULE | Refills: 0 | Status: SHIPPED | OUTPATIENT
Start: 2023-04-05

## 2023-04-10 RX ORDER — PROMETHAZINE HYDROCHLORIDE 25 MG/1
25 TABLET ORAL EVERY 6 HOURS PRN
Qty: 10 TABLET | Refills: 0 | Status: SHIPPED | OUTPATIENT
Start: 2023-04-10

## 2023-04-24 ENCOUNTER — OFFICE VISIT (OUTPATIENT)
Dept: OTOLARYNGOLOGY | Facility: CLINIC | Age: 48
End: 2023-04-24
Payer: COMMERCIAL

## 2023-04-24 VITALS
BODY MASS INDEX: 31.58 KG/M2 | DIASTOLIC BLOOD PRESSURE: 70 MMHG | WEIGHT: 185 LBS | TEMPERATURE: 98 F | RESPIRATION RATE: 20 BRPM | SYSTOLIC BLOOD PRESSURE: 136 MMHG | HEIGHT: 64 IN | HEART RATE: 82 BPM

## 2023-04-24 DIAGNOSIS — D48.5 NEOPLASM OF UNCERTAIN BEHAVIOR OF SKIN: Primary | ICD-10-CM

## 2023-04-24 PROCEDURE — 88305 TISSUE EXAM BY PATHOLOGIST: CPT | Performed by: OTOLARYNGOLOGY

## 2023-04-24 NOTE — PROGRESS NOTES
Preprocedure diagnosis: Clinically suspicious for basal cell carcinoma of the left lower eyelid    Post procedure diagnosis: Same    Procedure: Punch biopsy    Details:  Patient consent was obtained.  The skin was cleansed with alcohol.  The skin was infiltrated with 1 mL of 1% lidocaine with 1-100,000 epinephrine.  After approximately 10 minutes, a 2 millimeter punch biopsy was taken by placing circular motion on the biopsy tool, the skin was elevated and clipped with iris scissors.  The specimen was sent in formalin.  The skin was closed using a simple 5-0 fast absorbing gut suture.  Antibiotic ointment was placed over the biopsy site.  The patient tolerated the procedure with minimal discomfort.    Allen Colbert MD  04/24/23  13:38 CDT

## 2023-04-24 NOTE — PROGRESS NOTES
PRIMARY CARE PROVIDER: Jacob Simms MD  REFERRING PROVIDER: Jacob Simms MD    Chief Complaint   Patient presents with   • Skin Lesion     Lesion of left lower eyelid       Subjective   History of Present Illness:  Marjorie Barnett is a  47 y.o. female who presents for consultation regarding a skin lesion of the let lower eyelid.  The lesion has the following characteristics:    Quality: nonhealing lesion  Severity: mild  Duration: 1 year  Timing: constant  Modifying Factors: none  Associated Signs & Symptoms: no bleeding    Review of Systems:  Review of Systems   Constitutional: Negative for chills, fatigue, fever and unexpected weight change.   HENT: Negative for facial swelling.    Respiratory: Negative for cough, chest tightness and shortness of breath.    Cardiovascular: Negative for chest pain.   Musculoskeletal: Negative for neck pain.   Skin: Positive for wound. Negative for color change.   Neurological: Negative for facial asymmetry.   Hematological: Negative for adenopathy. Does not bruise/bleed easily.       Past History:  Past Medical History:   Diagnosis Date   • Abnormal Pap smear of cervix    • Anxiety    • Asthma    • Bunion, right    • Cystitis    • Disease of thyroid gland    • Hypercholesteremia    • Plantar fascia syndrome    • Seasonal allergies    • Urge incontinence      Past Surgical History:   Procedure Laterality Date   •  SECTION      x2   • CHOLECYSTECTOMY       Family History   Problem Relation Age of Onset   • Diabetes Mother    • Cancer Mother         kidney   • Kidney disease Mother    • Hyperlipidemia Mother    • Cancer Father         skin   • Hyperlipidemia Father    • Breast cancer Neg Hx    • Ovarian cancer Neg Hx    • Colon cancer Neg Hx      Social History     Tobacco Use   • Smoking status: Never   • Smokeless tobacco: Never   Substance Use Topics   • Alcohol use: Yes     Comment: occisonally   • Drug use: No     Allergies:  Codeine    Current  Outpatient Medications:   •  albuterol (PROVENTIL HFA;VENTOLIN HFA) 108 (90 BASE) MCG/ACT inhaler, Inhale 2 puffs Every 4 (Four) Hours As Needed for wheezing., Disp: 8 inhaler, Rfl: 1  •  ALPRAZolam (XANAX) 0.5 MG tablet, Take 1 tablet by mouth 3 (Three) Times a Day As Needed for Anxiety., Disp: 180 tablet, Rfl: 0  •  amphetamine-dextroamphetamine XR (ADDERALL XR) 20 MG 24 hr capsule, TAKE 1 CAPSULE BY MOUTH EVERY MORNING, Disp: 30 capsule, Rfl: 0  •  atorvastatin (LIPITOR) 40 MG tablet, TAKE 1 TABLET BY MOUTH EVERY NIGHT., Disp: 30 tablet, Rfl: 4  •  cetirizine (zyrTEC) 10 MG tablet, Take 1 tablet by mouth Daily., Disp: 30 tablet, Rfl: 2  •  levonorgestrel (Mirena, 52 MG,) 20 MCG/24HR IUD, 1 each by Intrauterine route 1 (One) Time. Inserted by Dr Gutiérrez in 2017, Disp: , Rfl:   •  oxybutynin XL (DITROPAN XL) 15 MG 24 hr tablet, TAKE 1 TABLET BY MOUTH DAILY., Disp: 30 tablet, Rfl: 2  •  promethazine (PHENERGAN) 25 MG tablet, Take 1 tablet by mouth Every 6 (Six) Hours As Needed for Nausea or Vomiting., Disp: 10 tablet, Rfl: 0  •  sertraline (ZOLOFT) 100 MG tablet, TAKE 2 TABLETS BY MOUTH DAILY., Disp: 60 tablet, Rfl: 5  •  Synthroid 125 MCG tablet, TAKE ONE TABLET DAILY (BRAND NAME PER DOCTOR), Disp: 90 tablet, Rfl: 1  •  vitamin D (ERGOCALCIFEROL) 1.25 MG (90323 UT) capsule capsule, Take 1 capsule by mouth 1 (One) Time Per Week., Disp: 5 capsule, Rfl: 2    Objective     Vital Signs:  Temp:  [98 °F (36.7 °C)] 98 °F (36.7 °C)  Heart Rate:  [82] 82  Resp:  [20] 20  BP: (136)/(70) 136/70    Physical Exam:  Physical Exam  Vitals and nursing note reviewed.   Constitutional:       General: She is not in acute distress.     Appearance: She is well-developed. She is not diaphoretic.   HENT:      Head: Normocephalic and atraumatic.      Right Ear: External ear normal.      Left Ear: External ear normal.      Nose: Nose normal.   Eyes:      General: No scleral icterus.        Right eye: No discharge.         Left eye: No  discharge.      Conjunctiva/sclera: Conjunctivae normal.      Pupils: Pupils are equal, round, and reactive to light.     Neck:      Thyroid: No thyromegaly.      Vascular: No JVD.      Trachea: No tracheal deviation.   Pulmonary:      Effort: Pulmonary effort is normal.      Breath sounds: No stridor.   Musculoskeletal:         General: No deformity. Normal range of motion.      Cervical back: Normal range of motion and neck supple.   Lymphadenopathy:      Cervical: No cervical adenopathy.   Skin:     General: Skin is warm and dry.      Coloration: Skin is not pale.      Findings: No erythema or rash.   Neurological:      Mental Status: She is alert and oriented to person, place, and time.      Cranial Nerves: No cranial nerve deficit.      Coordination: Coordination normal.   Psychiatric:         Speech: Speech normal.         Behavior: Behavior normal. Behavior is cooperative.         Thought Content: Thought content normal.         Judgment: Judgment normal.         Operative plan:    V-Y flap (Possible linear closure)    Assessment   1. Neoplasm of uncertain behavior of skin        Plan     I have offered biopsy of the lesion today in the office.      The risks, benefits, and alternatives of the procedure including but not limited to pain, scarring, bleeding, infection, persistent symptoms, and risks of the anesthesia were discussed full with the patient. Need for further therapy, depending on the pathologic outcome, was discussed. Questions were answered. No guarantees were made or implied.      I have offered excision of the basal cell carcinoma of the left lower eyelid with  frozen section analysis and likely  V-Y advancement flap closure closure in the office under local anesthesia.    Discussion of skin lesion. Discussed risks, benefits, alternatives, and possible complications of excision of the skin lesion with reconstruction utilizing local tissue rearrangement, full-thickness skin grafting, or local  interpolated flaps. Risks include, but are not limited too: bleeding, infection, hematoma, recurrence, need for additional procedures, flap failure, cosmetic deformity. Patient understands risks and would like to proceed with surgery.     My findings and recommendations were discussed and questions were answered.     Allen Colbert MD  04/24/23  13:31 CDT

## 2023-04-24 NOTE — LETTER
April 24, 2023       No Recipients    Patient: Marjorie Barnett   YOB: 1975   Date of Visit: 4/24/2023       Dear Dr. Barone Recipients:    Thank you for referring Marjorie Barnett to me for evaluation. Below are the relevant portions of my assessment and plan of care.    If you have questions, please do not hesitate to call me. I look forward to following Marjorie along with you.         Sincerely,        Allen Colbert MD        CC:   No Recipients    Allen Colbert MD  04/24/23 1758  Sign when Signing Visit  Preprocedure diagnosis: Clinically suspicious for basal cell carcinoma of the left lower eyelid    Post procedure diagnosis: Same    Procedure: Punch biopsy    Details:  Patient consent was obtained.  The skin was cleansed with alcohol.  The skin was infiltrated with 1 mL of 1% lidocaine with 1-100,000 epinephrine.  After approximately 10 minutes, a 2 millimeter punch biopsy was taken by placing circular motion on the biopsy tool, the skin was elevated and clipped with iris scissors.  The specimen was sent in formalin.  The skin was closed using a simple 5-0 fast absorbing gut suture.  Antibiotic ointment was placed over the biopsy site.  The patient tolerated the procedure with minimal discomfort.    Allen Colbert MD  04/24/23  13:38 CDT        Allen Colbert MD  04/24/23 9176  Sign when Signing Visit  PRIMARY CARE PROVIDER: Jacob Simms MD  REFERRING PROVIDER: Jacob Simms MD    Chief Complaint   Patient presents with   • Skin Lesion     Lesion of left lower eyelid       Subjective    History of Present Illness:  Marjorie Barnett is a  47 y.o. female who presents for consultation regarding a skin lesion of the let lower eyelid.  The lesion has the following characteristics:    Quality: nonhealing lesion  Severity: mild  Duration: 1 year  Timing: constant  Modifying Factors: none  Associated Signs & Symptoms: no bleeding    Review of Systems:  Review of Systems    Constitutional: Negative for chills, fatigue, fever and unexpected weight change.   HENT: Negative for facial swelling.    Respiratory: Negative for cough, chest tightness and shortness of breath.    Cardiovascular: Negative for chest pain.   Musculoskeletal: Negative for neck pain.   Skin: Positive for wound. Negative for color change.   Neurological: Negative for facial asymmetry.   Hematological: Negative for adenopathy. Does not bruise/bleed easily.       Past History:  Past Medical History:   Diagnosis Date   • Abnormal Pap smear of cervix    • Anxiety    • Asthma    • Bunion, right    • Cystitis    • Disease of thyroid gland    • Hypercholesteremia    • Plantar fascia syndrome    • Seasonal allergies    • Urge incontinence      Past Surgical History:   Procedure Laterality Date   •  SECTION      x2   • CHOLECYSTECTOMY       Family History   Problem Relation Age of Onset   • Diabetes Mother    • Cancer Mother         kidney   • Kidney disease Mother    • Hyperlipidemia Mother    • Cancer Father         skin   • Hyperlipidemia Father    • Breast cancer Neg Hx    • Ovarian cancer Neg Hx    • Colon cancer Neg Hx      Social History     Tobacco Use   • Smoking status: Never   • Smokeless tobacco: Never   Substance Use Topics   • Alcohol use: Yes     Comment: occisonally   • Drug use: No     Allergies:  Codeine    Current Outpatient Medications:   •  albuterol (PROVENTIL HFA;VENTOLIN HFA) 108 (90 BASE) MCG/ACT inhaler, Inhale 2 puffs Every 4 (Four) Hours As Needed for wheezing., Disp: 8 inhaler, Rfl: 1  •  ALPRAZolam (XANAX) 0.5 MG tablet, Take 1 tablet by mouth 3 (Three) Times a Day As Needed for Anxiety., Disp: 180 tablet, Rfl: 0  •  amphetamine-dextroamphetamine XR (ADDERALL XR) 20 MG 24 hr capsule, TAKE 1 CAPSULE BY MOUTH EVERY MORNING, Disp: 30 capsule, Rfl: 0  •  atorvastatin (LIPITOR) 40 MG tablet, TAKE 1 TABLET BY MOUTH EVERY NIGHT., Disp: 30 tablet, Rfl: 4  •  cetirizine (zyrTEC) 10 MG tablet,  Take 1 tablet by mouth Daily., Disp: 30 tablet, Rfl: 2  •  levonorgestrel (Mirena, 52 MG,) 20 MCG/24HR IUD, 1 each by Intrauterine route 1 (One) Time. Inserted by Dr Gutiérrez in 2017, Disp: , Rfl:   •  oxybutynin XL (DITROPAN XL) 15 MG 24 hr tablet, TAKE 1 TABLET BY MOUTH DAILY., Disp: 30 tablet, Rfl: 2  •  promethazine (PHENERGAN) 25 MG tablet, Take 1 tablet by mouth Every 6 (Six) Hours As Needed for Nausea or Vomiting., Disp: 10 tablet, Rfl: 0  •  sertraline (ZOLOFT) 100 MG tablet, TAKE 2 TABLETS BY MOUTH DAILY., Disp: 60 tablet, Rfl: 5  •  Synthroid 125 MCG tablet, TAKE ONE TABLET DAILY (BRAND NAME PER DOCTOR), Disp: 90 tablet, Rfl: 1  •  vitamin D (ERGOCALCIFEROL) 1.25 MG (56501 UT) capsule capsule, Take 1 capsule by mouth 1 (One) Time Per Week., Disp: 5 capsule, Rfl: 2    Objective      Vital Signs:  Temp:  [98 °F (36.7 °C)] 98 °F (36.7 °C)  Heart Rate:  [82] 82  Resp:  [20] 20  BP: (136)/(70) 136/70    Physical Exam:  Physical Exam  Vitals and nursing note reviewed.   Constitutional:       General: She is not in acute distress.     Appearance: She is well-developed. She is not diaphoretic.   HENT:      Head: Normocephalic and atraumatic.      Right Ear: External ear normal.      Left Ear: External ear normal.      Nose: Nose normal.   Eyes:      General: No scleral icterus.        Right eye: No discharge.         Left eye: No discharge.      Conjunctiva/sclera: Conjunctivae normal.      Pupils: Pupils are equal, round, and reactive to light.     Neck:      Thyroid: No thyromegaly.      Vascular: No JVD.      Trachea: No tracheal deviation.   Pulmonary:      Effort: Pulmonary effort is normal.      Breath sounds: No stridor.   Musculoskeletal:         General: No deformity. Normal range of motion.      Cervical back: Normal range of motion and neck supple.   Lymphadenopathy:      Cervical: No cervical adenopathy.   Skin:     General: Skin is warm and dry.      Coloration: Skin is not pale.      Findings: No  erythema or rash.   Neurological:      Mental Status: She is alert and oriented to person, place, and time.      Cranial Nerves: No cranial nerve deficit.      Coordination: Coordination normal.   Psychiatric:         Speech: Speech normal.         Behavior: Behavior normal. Behavior is cooperative.         Thought Content: Thought content normal.         Judgment: Judgment normal.         Operative plan:    V-Y flap (Possible linear closure)    Assessment    1. Neoplasm of uncertain behavior of skin        Plan      I have offered biopsy of the lesion today in the office.      The risks, benefits, and alternatives of the procedure including but not limited to pain, scarring, bleeding, infection, persistent symptoms, and risks of the anesthesia were discussed full with the patient. Need for further therapy, depending on the pathologic outcome, was discussed. Questions were answered. No guarantees were made or implied.      I have offered excision of the basal cell carcinoma of the left lower eyelid with  frozen section analysis and likely  V-Y advancement flap closure closure in the office under local anesthesia.    Discussion of skin lesion. Discussed risks, benefits, alternatives, and possible complications of excision of the skin lesion with reconstruction utilizing local tissue rearrangement, full-thickness skin grafting, or local interpolated flaps. Risks include, but are not limited too: bleeding, infection, hematoma, recurrence, need for additional procedures, flap failure, cosmetic deformity. Patient understands risks and would like to proceed with surgery.     My findings and recommendations were discussed and questions were answered.     Allen Colbert MD  04/24/23  13:31 CDT

## 2023-04-25 ENCOUNTER — TELEPHONE (OUTPATIENT)
Dept: OBSTETRICS AND GYNECOLOGY | Facility: CLINIC | Age: 48
End: 2023-04-25

## 2023-04-25 NOTE — TELEPHONE ENCOUNTER
Can one of you get in touch with this lady to schedule her for a visit? It would be a gyn f/u to discuss a problem.

## 2023-04-25 NOTE — TELEPHONE ENCOUNTER
PT STATES SHE IS NEEDING TO BE SEEN FOR AN OVERACTIVE BLADDER AND STATES SHE HAS BEEN WETTING THE BED AND HAS BEEN TAKING HER MEDICATION THAT WAS PRESCRIBED AND HAS BEEN DOING THE THINGS SHE HAS BEEN ADVISED TO DO AND IS STILL HAVING THE ISSUE. PLEASE ADVISE PT ON [employer] AVAIL[employer].

## 2023-04-26 LAB
CYTO UR: NORMAL
LAB AP CASE REPORT: NORMAL
LAB AP CLINICAL INFORMATION: NORMAL
Lab: NORMAL
PATH REPORT.FINAL DX SPEC: NORMAL
PATH REPORT.GROSS SPEC: NORMAL

## 2023-04-27 ENCOUNTER — TELEPHONE (OUTPATIENT)
Dept: OTOLARYNGOLOGY | Facility: CLINIC | Age: 48
End: 2023-04-27
Payer: COMMERCIAL

## 2023-04-27 NOTE — TELEPHONE ENCOUNTER
Patient has been contacted with results of her biopsy. She is set for IOP for complete removal of Bcc.

## 2023-04-27 NOTE — TELEPHONE ENCOUNTER
[facility] received a fax that requires your attention. The document has been indexed to the patient’s chart for your review.      Reason for sending: NEEDS PROVIDER REVIEW    Documents Description:  PREAUTHORIZATION REQUIREMENTS    Name of Sender: DAVIDA    Date Indexed: 04/27/23

## 2023-05-04 DIAGNOSIS — N39.46 MIXED INCONTINENCE: ICD-10-CM

## 2023-05-04 DIAGNOSIS — F98.8 ATTENTION DEFICIT DISORDER (ADD) WITHOUT HYPERACTIVITY: ICD-10-CM

## 2023-05-04 RX ORDER — OXYBUTYNIN CHLORIDE 15 MG/1
15 TABLET, EXTENDED RELEASE ORAL DAILY
Qty: 30 TABLET | Refills: 2 | OUTPATIENT
Start: 2023-05-04

## 2023-05-04 RX ORDER — DEXTROAMPHETAMINE SACCHARATE, AMPHETAMINE ASPARTATE MONOHYDRATE, DEXTROAMPHETAMINE SULFATE AND AMPHETAMINE SULFATE 5; 5; 5; 5 MG/1; MG/1; MG/1; MG/1
20 CAPSULE, EXTENDED RELEASE ORAL EVERY MORNING
Qty: 30 CAPSULE | Refills: 0 | Status: SHIPPED | OUTPATIENT
Start: 2023-05-04

## 2023-05-05 ENCOUNTER — OFFICE VISIT (OUTPATIENT)
Dept: OBSTETRICS AND GYNECOLOGY | Facility: CLINIC | Age: 48
End: 2023-05-05
Payer: COMMERCIAL

## 2023-05-05 VITALS
BODY MASS INDEX: 30.05 KG/M2 | SYSTOLIC BLOOD PRESSURE: 132 MMHG | DIASTOLIC BLOOD PRESSURE: 88 MMHG | HEIGHT: 64 IN | WEIGHT: 176 LBS

## 2023-05-05 DIAGNOSIS — N39.41 URGE INCONTINENCE: Primary | ICD-10-CM

## 2023-05-05 PROCEDURE — 99213 OFFICE O/P EST LOW 20 MIN: CPT | Performed by: OBSTETRICS & GYNECOLOGY

## 2023-05-05 NOTE — PROGRESS NOTES
"Marjorie Barnett is a 47 y.o. female here today for treatment of urinary urgency and nocturia.  She has been using Ditropan XL 15 mg for several months, with waning effectiveness.  She currently has a Mirena IUD in place.  She denies vaginal bulging.    Visit Vitals  /88 (BP Location: Left arm, Patient Position: Sitting)   Ht 162.6 cm (64\")   Wt 79.8 kg (176 lb)   BMI 30.21 kg/m²     Pleasant female no acute distress  Mood and affect normal  Breathing unlabored  Normal external genitalia.  Vaginal exam reveals no significant cystocele, rectocele, or uterine prolapse.  The IUD strings are present.    Assessment: Urinary urgency, nocturia    She is on the maximum dose of Ditropan XL.  Since her symptoms are not adequately controlled, we have decided to try an alternative medication.  She will stop the Ditropan, and I have given her samples of Myrbetriq 25 mg daily.  We can increase to 50 mg if needed.  She will contact the office within a couple of weeks to let us know how her symptoms are affected by the medication.  If her symptoms persist despite this second line therapy, then she can be referred to consider InterStim therapy.  Call with questions or concerns.        "

## 2023-05-11 ENCOUNTER — PATIENT MESSAGE (OUTPATIENT)
Dept: FAMILY MEDICINE CLINIC | Facility: CLINIC | Age: 48
End: 2023-05-11
Payer: COMMERCIAL

## 2023-05-11 ENCOUNTER — PROCEDURE VISIT (OUTPATIENT)
Dept: OTOLARYNGOLOGY | Facility: CLINIC | Age: 48
End: 2023-05-11
Payer: COMMERCIAL

## 2023-05-11 VITALS
BODY MASS INDEX: 30.05 KG/M2 | HEIGHT: 64 IN | DIASTOLIC BLOOD PRESSURE: 76 MMHG | HEART RATE: 82 BPM | TEMPERATURE: 98 F | WEIGHT: 176 LBS | SYSTOLIC BLOOD PRESSURE: 142 MMHG | RESPIRATION RATE: 20 BRPM

## 2023-05-11 DIAGNOSIS — C44.1192 BASAL CELL CARCINOMA OF LOWER EYELID, LEFT: Primary | ICD-10-CM

## 2023-05-11 LAB
ALBUMIN SERPL-MCNC: 4.8 G/DL (ref 3.5–5.2)
ALBUMIN/GLOB SERPL: 2.1 G/DL
ALP SERPL-CCNC: 131 U/L (ref 39–117)
ALT SERPL-CCNC: 15 U/L (ref 1–33)
AST SERPL-CCNC: 10 U/L (ref 1–32)
BASOPHILS # BLD AUTO: 0.06 10*3/MM3 (ref 0–0.2)
BASOPHILS NFR BLD AUTO: 1.1 % (ref 0–1.5)
BILIRUB SERPL-MCNC: 0.4 MG/DL (ref 0–1.2)
BUN SERPL-MCNC: 11 MG/DL (ref 6–20)
BUN/CREAT SERPL: 11.6 (ref 7–25)
CALCIUM SERPL-MCNC: 9.8 MG/DL (ref 8.6–10.5)
CHLORIDE SERPL-SCNC: 103 MMOL/L (ref 98–107)
CHOLEST SERPL-MCNC: 257 MG/DL (ref 0–200)
CHOLEST/HDLC SERPL: 5.59 {RATIO}
CO2 SERPL-SCNC: 30.7 MMOL/L (ref 22–29)
CREAT SERPL-MCNC: 0.95 MG/DL (ref 0.57–1)
EGFRCR SERPLBLD CKD-EPI 2021: 75.5 ML/MIN/1.73
EOSINOPHIL # BLD AUTO: 0.27 10*3/MM3 (ref 0–0.4)
EOSINOPHIL NFR BLD AUTO: 4.8 % (ref 0.3–6.2)
ERYTHROCYTE [DISTWIDTH] IN BLOOD BY AUTOMATED COUNT: 14.5 % (ref 12.3–15.4)
GLOBULIN SER CALC-MCNC: 2.3 GM/DL
GLUCOSE SERPL-MCNC: 100 MG/DL (ref 65–99)
HCT VFR BLD AUTO: 41.5 % (ref 34–46.6)
HDLC SERPL-MCNC: 46 MG/DL (ref 40–60)
HGB BLD-MCNC: 13.2 G/DL (ref 12–15.9)
IMM GRANULOCYTES # BLD AUTO: 0.01 10*3/MM3 (ref 0–0.05)
IMM GRANULOCYTES NFR BLD AUTO: 0.2 % (ref 0–0.5)
LDLC SERPL CALC-MCNC: 180 MG/DL (ref 0–100)
LYMPHOCYTES # BLD AUTO: 1.72 10*3/MM3 (ref 0.7–3.1)
LYMPHOCYTES NFR BLD AUTO: 30.6 % (ref 19.6–45.3)
MCH RBC QN AUTO: 26.3 PG (ref 26.6–33)
MCHC RBC AUTO-ENTMCNC: 31.8 G/DL (ref 31.5–35.7)
MCV RBC AUTO: 82.7 FL (ref 79–97)
MONOCYTES # BLD AUTO: 0.5 10*3/MM3 (ref 0.1–0.9)
MONOCYTES NFR BLD AUTO: 8.9 % (ref 5–12)
NEUTROPHILS # BLD AUTO: 3.07 10*3/MM3 (ref 1.7–7)
NEUTROPHILS NFR BLD AUTO: 54.4 % (ref 42.7–76)
NRBC BLD AUTO-RTO: 0 /100 WBC (ref 0–0.2)
PLATELET # BLD AUTO: 310 10*3/MM3 (ref 140–450)
POTASSIUM SERPL-SCNC: 4 MMOL/L (ref 3.5–5.2)
PROT SERPL-MCNC: 7.1 G/DL (ref 6–8.5)
RBC # BLD AUTO: 5.02 10*6/MM3 (ref 3.77–5.28)
SODIUM SERPL-SCNC: 142 MMOL/L (ref 136–145)
TRIGL SERPL-MCNC: 168 MG/DL (ref 0–150)
TSH SERPL DL<=0.005 MIU/L-ACNC: 0.16 UIU/ML (ref 0.27–4.2)
VLDLC SERPL CALC-MCNC: 31 MG/DL (ref 5–40)
WBC # BLD AUTO: 5.63 10*3/MM3 (ref 3.4–10.8)

## 2023-05-11 PROCEDURE — 88331 PATH CONSLTJ SURG 1 BLK 1SPC: CPT | Performed by: PATHOLOGY

## 2023-05-11 PROCEDURE — 88305 TISSUE EXAM BY PATHOLOGIST: CPT | Performed by: OTOLARYNGOLOGY

## 2023-05-11 NOTE — PROGRESS NOTES
PATIENT NAME: Marjorie Barnett    DATE:  05/11/23    PREOPERATIVE DIAGNOSIS: Basal cell carcinoma skin of left lower eyelid and upper cheek    POSTOPERATIVE DIAGNOSIS: Basal cell carcinoma skin of left lower eyelid and upper cheek    PROCEDURE:  1) excision of malignant neoplasm skin of left lower eyelid and upper cheek, 9 mm x 11 mm   2) local tissue rearrangement (rotational flap) of left cheek and lower eyelid, 3.0 cm x 4.0 cm    SURGEON:  Allen Colbert MD, FACS    FACILITY: [facility] Office Procedure Room    ANESTHESIA:  Local with 3 cc 1% lidocaine and 1:100,000 epinephrine    DICTATED BY:  Allen Colbert MD, FACS    IVF: None    IMPLANTS: None    DRAINS: None    SPECIMENS: 1) basal cell carcinoma skin of left lower eyelid/cheek, stitch at 12:00-frozen   2) additional 12:00 to 6:00 margin, blue is the new margin-frozen    EBL: 30 cc    COMPLICATIONS: None apparent    INDICATIONS FOR SURGERY: Ms. Barnett presented with a lesion of her left lower eyelid and cheek.  A biopsy was performed demonstrating basal cell carcinoma.  She opted for surgical excision and flap reconstruction.    OPERATIVE FINDINGS:     Lesion: 3 mm x 5 mm  Margins: 3 mm  Defect: 9 mm x 11 mm  Flap and Defect: 3.0 cm x 4.0 cm  Depth: Into the subcutaneous fat    OPERATIVE DETAILS:     After patient verification consent material was reviewed, the patient was taken to the procedure room and laid supine on the procedure table.  The skin at the area was cleansed with alcohol, the area marked, appropriate margins (as listed above) were drawn, and this was converted into a rotational flap design, the patient was then handed a mirror where we reviewed the intended excision, and verified the consent.  This served as the formal timeout procedure.  The skin was  infiltrated with 1% lidocaine with 1-100,000 epinephrine.    After approximately 15 minutes, the skin was tested for anesthesia and deemed appropriate.    The patient was sterilely  prepped with Hibiclens, and sterilely draped.    The skin surrounding the lesion that was previously marked was incised utilizing a fresh 15 blade.  This was undermined in the immediate subcutaneous plane.  I placed a stitch at 12:00, and sent this for frozen section pathology.  Grossly examining the specimen, this appeared close to the 6:00 margin.  As a result, I took an additional swipe from approximate 11:00 through 6:00, including 3:00, placed surgical pen at the distal margin and sent this for frozen section analysis.  Hemostasis was obtained with bipolar cautery set at 15.    Frozen section analysis demonstrated clear peripheral and deep margins.    Utilizing a fresh 15 blade, the flap incisions were then created.  The flap was then undermined in the immediate subcutaneous plane utilizing a 15 blade and curved iris scissors.  Hemostasis was again obtained utilizing bipolar cautery set at 15.    The flaps were advanced, and closed utilizing deep 5-0 undyed Vicryl suture.  The skin was further closed utilizing 6 running, locking 6-0 Prolene along the cheek, and running, locking 5-0 fast-absorbing gut on the lower eyelid.      Antibiotic ointment was placed to the incisions and the flaps.      DISPOSITION:  The procedures were completed without complication and tolerated well.  The patient was released in the company of herself to return home in satisfactory condition.  A follow-up appointment has been scheduled, routine post-op medications prescribed (if required), and post-op instructions were given to the responsible party.           Allen Colbert MD, FACS  Board Certified Facial Plastic and Reconstructive Surgery  Board Certified Otolaryngology -- Head and Neck Surgery    Electronically signed by Allen Colbert MD, 05/11/23, 12:59 PM CDT.

## 2023-05-11 NOTE — PATIENT INSTRUCTIONS
Psychiatric, Post-Procedure Instructions:    Protect the incisions from sunlight. Sunlight to the incisions will cause permanent pigmentation to the incision line and make the incision more noticeable. After the incision has reepithelialized (typically 2-3 weeks after the procedure), you may begin to use sunscreen with an SPF of 15 or greater    For the first week after your procedure, apply a thin coat of Vaseline to the incision 3-4 times daily.  Starting the second week, use a silicone-based wound cream to the incisions to optimize the end result. Apply topically twice daily, or as directed, to help optimize wound healing and decrease redness.  Should the area need to be cleaned, gently apply peroxide on a Q-tip to the area.  Do not clean the area more than once daily with peroxide, as it can delay wound healing.    Avoid getting water on the surgical site for 3 days.  On day 4, you may briefly get the surgical site with clean water, but avoid soapy water to the area for 1 week.      Due to COVID-19, we have decreased the amount of postoperative checks to help prevent added exposure to the virus.  If you have any questions or concerns following her procedure, please give us a call.  We have the ability to schedule a video visit, phone visit, or follow-up visit to address any concerns, while decreasing your exposure to the hospital.  Many of your questions and concerns may be able to be answered over the phone.  Our direct line is [phone].    It is very important to continue routine skin checks with a dermatologist or your PCP every 6-12 months.        CONTACT INFORMATION:  The main office phone number is [phone]. For emergencies after hours and on weekends, this number will convert over to our answering service and the on call provider will answer. Please try to keep non emergent phone calls/ questions to office hours 9am-5pm Monday through Friday.     [employer]  As an alternative, you can  sign up and use the Epic MyChart system for more direct and quicker access for non emergent questions/ problems.  [facility] [employer] allows you to send messages to your doctor, view your test results, renew your prescriptions, schedule appointments, and more. To sign up, go to [email or web address].    If you have questions, you can email [email or web address] or call [phone] to talk to our [employer] staff. Remember, [employer] is NOT to be used for urgent needs. For medical emergencies, dial 911.

## 2023-05-11 NOTE — LETTER
May 11, 2023       No Recipients    Patient: Marjorie Barnett   YOB: 1975   Date of Visit: 5/11/2023       Dear Dr. Barone Recipients:    Thank you for referring Marjorie Barnett to me for evaluation. Below are the relevant portions of my assessment and plan of care.    If you have questions, please do not hesitate to call me. I look forward to following Marjorie along with you.         Sincerely,        Allen Colbert MD        CC:   No Recipients    Allen Colbert MD  05/11/23 1259  Incomplete  PATIENT NAME: Marjorie Barnett    DATE:  05/11/23    PREOPERATIVE DIAGNOSIS: Basal cell carcinoma skin of left lower eyelid and upper cheek    POSTOPERATIVE DIAGNOSIS: Basal cell carcinoma skin of left lower eyelid and upper cheek    PROCEDURE:  1) excision of malignant neoplasm skin of left lower eyelid and upper cheek, 9 mm x 11 mm   2) local tissue rearrangement (rotational flap) of left cheek and lower eyelid, 3.0 cm x 4.0 cm    SURGEON:  Allen Colbert MD, FACS    FACILITY: [facility] Office Procedure Room    ANESTHESIA:  Local with 3 cc 1% lidocaine and 1:100,000 epinephrine    DICTATED BY:  Allen Colbert MD, FACS    IVF: None    IMPLANTS: None    DRAINS: None    SPECIMENS: 1) basal cell carcinoma skin of left lower eyelid/cheek, stitch at 12:00-frozen   2) additional 12:00 to 6:00 margin, blue is the new margin-frozen    EBL: 30 cc    COMPLICATIONS: None apparent    INDICATIONS FOR SURGERY: Ms. Barnett presented with a lesion of her left lower eyelid and cheek.  A biopsy was performed demonstrating basal cell carcinoma.  She opted for surgical excision and flap reconstruction.    OPERATIVE FINDINGS:     Lesion: 3 mm x 5 mm  Margins: 3 mm  Defect: 9 mm x 11 mm  Flap and Defect: 3.0 cm x 4.0 cm  Depth: Into the subcutaneous fat    OPERATIVE DETAILS:     After patient verification consent material was reviewed, the patient was taken to the procedure room and laid supine on the procedure table.   The skin at the area was cleansed with alcohol, the area marked, appropriate margins (as listed above) were drawn, and this was converted into a rotational flap design, the patient was then handed a mirror where we reviewed the intended excision, and verified the consent.  This served as the formal timeout procedure.  The skin was  infiltrated with 1% lidocaine with 1-100,000 epinephrine.    After approximately 15 minutes, the skin was tested for anesthesia and deemed appropriate.    The patient was sterilely prepped with Hibiclens, and sterilely draped.    The skin surrounding the lesion that was previously marked was incised utilizing a fresh 15 blade.  This was undermined in the immediate subcutaneous plane.  I placed a stitch at 12:00, and sent this for frozen section pathology.  Grossly examining the specimen, this appeared close to the 6:00 margin.  As a result, I took an additional swipe from approximate 11:00 through 6:00, including 3:00, placed surgical pen at the distal margin and sent this for frozen section analysis.  Hemostasis was obtained with bipolar cautery set at 15.    Frozen section analysis demonstrated clear peripheral and deep margins.    Utilizing a fresh 15 blade, the flap incisions were then created.  The flap was then undermined in the immediate subcutaneous plane utilizing a 15 blade and curved iris scissors.  Hemostasis was again obtained utilizing bipolar cautery set at 15.    The flaps were advanced, and closed utilizing deep 5-0 undyed Vicryl suture.  The skin was further closed utilizing 6 running, locking 6-0 Prolene along the cheek, and running, locking 5-0 fast-absorbing gut on the lower eyelid.      Antibiotic ointment was placed to the incisions and the flaps.      DISPOSITION:  The procedures were completed without complication and tolerated well.  The patient was released in the company of herself to return home in satisfactory condition.  A follow-up appointment has been  scheduled, routine post-op medications prescribed (if required), and post-op instructions were given to the responsible party.           Allen Colbert MD, FACS  Board Certified Facial Plastic and Reconstructive Surgery  Board Certified Otolaryngology -- Head and Neck Surgery    Electronically signed by Allen Colbert MD, 05/11/23, 12:59 PM CDT.

## 2023-05-12 RX ORDER — PRAVASTATIN SODIUM 20 MG
20 TABLET ORAL NIGHTLY
Qty: 30 TABLET | Refills: 4 | Status: SHIPPED | OUTPATIENT
Start: 2023-05-12

## 2023-05-13 LAB
CYTO UR: NORMAL
LAB AP CASE REPORT: NORMAL
LAB AP CLINICAL INFORMATION: NORMAL
Lab: NORMAL
Lab: NORMAL
PATH REPORT.FINAL DX SPEC: NORMAL
PATH REPORT.GROSS SPEC: NORMAL

## 2023-05-15 RX ORDER — LEVOTHYROXINE SODIUM 100 MCG
100 TABLET ORAL DAILY
Qty: 30 TABLET | Refills: 3 | Status: SHIPPED | OUTPATIENT
Start: 2023-05-15

## 2023-05-18 ENCOUNTER — PATIENT MESSAGE (OUTPATIENT)
Dept: OBSTETRICS AND GYNECOLOGY | Facility: CLINIC | Age: 48
End: 2023-05-18
Payer: COMMERCIAL

## 2023-05-18 ENCOUNTER — OFFICE VISIT (OUTPATIENT)
Dept: OTOLARYNGOLOGY | Facility: CLINIC | Age: 48
End: 2023-05-18
Payer: COMMERCIAL

## 2023-05-18 DIAGNOSIS — Z48.02 VISIT FOR SUTURE REMOVAL: Primary | ICD-10-CM

## 2023-05-18 NOTE — PROGRESS NOTES
Patient here today one week post-=op after exc bcc of left lower eyelid . She is doing well and wound is healing well. Patient was given instructions on wound care after suture removal.she will follow up in 3-4 month and call in between time if she has any questions or concerns.

## 2023-05-22 DIAGNOSIS — E03.9 HYPOTHYROIDISM, UNSPECIFIED TYPE: Primary | ICD-10-CM

## 2023-05-30 DIAGNOSIS — R53.83 OTHER FATIGUE: Primary | ICD-10-CM

## 2023-05-31 RX ORDER — SOLIFENACIN SUCCINATE 5 MG/1
5 TABLET, FILM COATED ORAL DAILY
Qty: 30 TABLET | Refills: 3 | Status: SHIPPED | OUTPATIENT
Start: 2023-05-31

## 2023-06-10 LAB
25(OH)D3+25(OH)D2 SERPL-MCNC: 21.2 NG/ML (ref 30–100)
CORTIS AM PEAK SERPL-MCNC: 12.8 UG/DL (ref 6.2–19.4)
DHEA-S SERPL-MCNC: 154 UG/DL (ref 41.2–243.7)
ESTRADIOL SERPL-MCNC: 302 PG/ML
ESTRONE SERPL-MCNC: 162 PG/ML
GLUCOSE SERPL-MCNC: 99 MG/DL (ref 65–99)
HBA1C MFR BLD: 5.5 % (ref 4.8–5.6)
INSULIN SERPL-ACNC: 13.4 UIU/ML (ref 2.6–24.9)
PROGEST SERPL-MCNC: 0.6 NG/ML
T3FREE SERPL-MCNC: 3 PG/ML (ref 2–4.4)
T3REVERSE SERPL-MCNC: 23.5 NG/DL (ref 9.2–24.1)
T4 FREE SERPL-MCNC: 1.24 NG/DL (ref 0.93–1.7)
TESTOST SERPL-MCNC: 25 NG/DL (ref 4–50)
THYROGLOB AB SERPL-ACNC: <1 IU/ML (ref 0–0.9)
THYROPEROXIDASE AB SERPL-ACNC: 145 IU/ML (ref 0–34)
TSH SERPL DL<=0.005 MIU/L-ACNC: 0.2 UIU/ML (ref 0.27–4.2)
VIT B12 SERPL-MCNC: 505 PG/ML (ref 211–946)

## 2023-06-15 DIAGNOSIS — F98.8 ATTENTION DEFICIT DISORDER (ADD) WITHOUT HYPERACTIVITY: ICD-10-CM

## 2023-06-15 RX ORDER — DEXTROAMPHETAMINE SACCHARATE, AMPHETAMINE ASPARTATE MONOHYDRATE, DEXTROAMPHETAMINE SULFATE AND AMPHETAMINE SULFATE 5; 5; 5; 5 MG/1; MG/1; MG/1; MG/1
20 CAPSULE, EXTENDED RELEASE ORAL EVERY MORNING
Qty: 30 CAPSULE | Refills: 0 | Status: SHIPPED | OUTPATIENT
Start: 2023-06-15

## 2023-07-27 ENCOUNTER — PATIENT MESSAGE (OUTPATIENT)
Dept: FAMILY MEDICINE CLINIC | Facility: CLINIC | Age: 48
End: 2023-07-27
Payer: COMMERCIAL

## 2023-07-27 DIAGNOSIS — E03.9 HYPOTHYROIDISM, UNSPECIFIED TYPE: Primary | ICD-10-CM

## 2023-07-27 NOTE — TELEPHONE ENCOUNTER
I'm sorry about the visit---in reviewing your phone calls you were very specific about seeing L.B and I kinow you were working with Callie on the referral--it might be helpful to talk with her about the specifics--your labs should  be back tomorrow or early next week and we will forward to your specialist

## 2023-07-28 LAB
T4 FREE SERPL-MCNC: 0.9 NG/DL (ref 0.93–1.7)
TSH SERPL DL<=0.005 MIU/L-ACNC: 3.7 UIU/ML (ref 0.27–4.2)

## 2023-08-09 DIAGNOSIS — F98.8 ATTENTION DEFICIT DISORDER (ADD) WITHOUT HYPERACTIVITY: ICD-10-CM

## 2023-08-09 DIAGNOSIS — F41.9 ANXIETY: ICD-10-CM

## 2023-08-09 RX ORDER — ALPRAZOLAM 0.5 MG/1
0.5 TABLET ORAL 3 TIMES DAILY PRN
Qty: 180 TABLET | Refills: 0 | Status: SHIPPED | OUTPATIENT
Start: 2023-08-09

## 2023-08-09 RX ORDER — DEXTROAMPHETAMINE SACCHARATE, AMPHETAMINE ASPARTATE MONOHYDRATE, DEXTROAMPHETAMINE SULFATE AND AMPHETAMINE SULFATE 5; 5; 5; 5 MG/1; MG/1; MG/1; MG/1
20 CAPSULE, EXTENDED RELEASE ORAL EVERY MORNING
Qty: 30 CAPSULE | Refills: 0 | Status: SHIPPED | OUTPATIENT
Start: 2023-08-09

## 2023-08-09 RX ORDER — SERTRALINE HYDROCHLORIDE 100 MG/1
200 TABLET, FILM COATED ORAL DAILY
Qty: 60 TABLET | Refills: 5 | Status: SHIPPED | OUTPATIENT
Start: 2023-08-09

## 2023-08-16 ENCOUNTER — OFFICE VISIT (OUTPATIENT)
Dept: FAMILY MEDICINE CLINIC | Facility: CLINIC | Age: 48
End: 2023-08-16
Payer: COMMERCIAL

## 2023-08-16 VITALS
DIASTOLIC BLOOD PRESSURE: 92 MMHG | TEMPERATURE: 98.4 F | HEART RATE: 86 BPM | BODY MASS INDEX: 30.39 KG/M2 | SYSTOLIC BLOOD PRESSURE: 148 MMHG | WEIGHT: 178 LBS | RESPIRATION RATE: 16 BRPM | HEIGHT: 64 IN | OXYGEN SATURATION: 97 %

## 2023-08-16 DIAGNOSIS — I10 ESSENTIAL HYPERTENSION: Primary | ICD-10-CM

## 2023-08-16 PROCEDURE — 99213 OFFICE O/P EST LOW 20 MIN: CPT | Performed by: FAMILY MEDICINE

## 2023-08-16 RX ORDER — NEBIVOLOL 10 MG/1
10 TABLET ORAL DAILY
Qty: 30 TABLET | Refills: 1 | Status: SHIPPED | OUTPATIENT
Start: 2023-08-16

## 2023-08-16 NOTE — PROGRESS NOTES
Subjective   Marjorie Barnett is a 47 y.o. female.     Chief Complaint   Patient presents with    Hypertension    Hypothyroidism       History of Present Illness     She nots elevaed bp in the past few mos---      Current Outpatient Medications:     albuterol (PROVENTIL HFA;VENTOLIN HFA) 108 (90 BASE) MCG/ACT inhaler, Inhale 2 puffs Every 4 (Four) Hours As Needed for wheezing., Disp: 8 inhaler, Rfl: 1    ALPRAZolam (XANAX) 0.5 MG tablet, TAKE 1 TABLET BY MOUTH 3 (THREE) TIMES A DAY AS NEEDED FOR ANXIETY., Disp: 180 tablet, Rfl: 0    cetirizine (zyrTEC) 10 MG tablet, Take 1 tablet by mouth Daily., Disp: 30 tablet, Rfl: 2    levonorgestrel (Mirena, 52 MG,) 20 MCG/24HR IUD, 1 each by Intrauterine route 1 (One) Time. Inserted by Dr Gutiérrez in 2017, Disp: , Rfl:     pravastatin (Pravachol) 20 MG tablet, Take 1 tablet by mouth Every Night., Disp: 30 tablet, Rfl: 4    promethazine (PHENERGAN) 25 MG tablet, Take 1 tablet by mouth Every 6 (Six) Hours As Needed for Nausea or Vomiting., Disp: 10 tablet, Rfl: 0    sertraline (ZOLOFT) 100 MG tablet, TAKE 2 TABLETS BY MOUTH DAILY., Disp: 60 tablet, Rfl: 5    solifenacin (VESICARE) 5 MG tablet, Take 1 tablet by mouth Daily., Disp: 30 tablet, Rfl: 3    Synthroid 100 MCG tablet, Take 1 tablet by mouth Daily., Disp: 30 tablet, Rfl: 3    vitamin D (ERGOCALCIFEROL) 1.25 MG (74701 UT) capsule capsule, Take 1 capsule by mouth 1 (One) Time Per Week., Disp: 5 capsule, Rfl: 2    nebivolol (Bystolic) 10 MG tablet, Take 1 tablet by mouth Daily., Disp: 30 tablet, Rfl: 1  Allergies   Allergen Reactions    Codeine Itching and GI Intolerance     Chest pain              Past Medical History:   Diagnosis Date    Abnormal Pap smear of cervix     Anxiety     Asthma     Bunion, right     Cystitis     Disease of thyroid gland     Hypercholesteremia     Plantar fascia syndrome     Seasonal allergies     Urge incontinence      Past Surgical History:   Procedure Laterality Date     SECTION       "x2    CHOLECYSTECTOMY         Review of Systems   Constitutional: Negative.    HENT: Negative.     Eyes: Negative.    Respiratory: Negative.     Cardiovascular: Negative.    Gastrointestinal: Negative.    Endocrine: Negative.    Genitourinary: Negative.    Musculoskeletal: Negative.    Skin: Negative.    Allergic/Immunologic: Negative.    Neurological: Negative.    Hematological: Negative.    Psychiatric/Behavioral: Negative.       Objective /92   Pulse 86   Temp 98.4 øF (36.9 øC)   Resp 16   Ht 162.6 cm (64.02\")   Wt 80.7 kg (178 lb)   SpO2 97%   BMI 30.54 kg/mý    Physical Exam  Vitals and nursing note reviewed.   Constitutional:       Appearance: She is normal weight.   HENT:      Head: Normocephalic and atraumatic.      Nose: Nose normal.      Mouth/Throat:      Mouth: Mucous membranes are moist.   Eyes:      Pupils: Pupils are equal, round, and reactive to light.   Cardiovascular:      Rate and Rhythm: Normal rate and regular rhythm.      Pulses: Normal pulses.      Heart sounds: Normal heart sounds.   Pulmonary:      Effort: Pulmonary effort is normal.   Abdominal:      General: Abdomen is flat. Bowel sounds are normal.   Musculoskeletal:         General: Normal range of motion.      Cervical back: Normal range of motion and neck supple.   Skin:     General: Skin is warm.      Capillary Refill: Capillary refill takes less than 2 seconds.   Neurological:      General: No focal deficit present.      Mental Status: She is alert and oriented to person, place, and time. Mental status is at baseline.   Psychiatric:         Mood and Affect: Mood normal.       Assessment & Plan   Diagnoses and all orders for this visit:    1. Essential hypertension (Primary)  -     CBC & Differential  -     Comprehensive metabolic panel  -     Lipid Panel With / Chol / HDL Ratio  -     T4, Free  -     TSH  -     Urinalysis without microscopic (no culture) - Urine, Clean Catch  -     ECG 12 Lead; Future    Other orders  -  "    nebivolol (Bystolic) 10 MG tablet; Take 1 tablet by mouth Daily.  Dispense: 30 tablet; Refill: 1      Monirro bp at home and keep me infoemd           Orders Placed This Encounter   Procedures    Comprehensive metabolic panel     Order Specific Question:   Release to patient     Answer:   Routine Release [1400000002]    Lipid Panel With / Chol / HDL Ratio     Order Specific Question:   Release to patient     Answer:   Routine Release [1400000002]    T4, Free     Order Specific Question:   Release to patient     Answer:   Routine Release [1400000002]    TSH     Order Specific Question:   Release to patient     Answer:   Routine Release [1400000002]    Urinalysis without microscopic (no culture) - Urine, Clean Catch     Order Specific Question:   Release to patient     Answer:   Routine Release [[id number]]    ECG 12 Lead     Standing Status:   Future     Standing Expiration Date:   8/16/2024     Order Specific Question:   Reason for Exam:     Answer:   htn     Order Specific Question:   Release to patient     Answer:   Routine Release [1400000002]    CBC & Differential     Order Specific Question:   Release to patient     Answer:   Routine Release [1400000002]       Follow up: 4 week(s)

## 2023-08-17 ENCOUNTER — TELEPHONE (OUTPATIENT)
Dept: FAMILY MEDICINE CLINIC | Facility: CLINIC | Age: 48
End: 2023-08-17
Payer: COMMERCIAL

## 2023-08-17 DIAGNOSIS — L98.9 SKIN LESION: Primary | ICD-10-CM

## 2023-08-17 DIAGNOSIS — Z12.11 SCREEN FOR COLON CANCER: Primary | ICD-10-CM

## 2023-08-17 RX ORDER — LISINOPRIL AND HYDROCHLOROTHIAZIDE 12.5; 1 MG/1; MG/1
1 TABLET ORAL DAILY
Qty: 30 TABLET | Refills: 1 | Status: SHIPPED | OUTPATIENT
Start: 2023-08-17

## 2023-08-23 ENCOUNTER — HOSPITAL ENCOUNTER (OUTPATIENT)
Dept: CARDIOLOGY | Facility: HOSPITAL | Age: 48
Discharge: HOME | End: 2023-08-23
Admitting: FAMILY MEDICINE
Payer: COMMERCIAL

## 2023-08-23 DIAGNOSIS — I10 ESSENTIAL HYPERTENSION: ICD-10-CM

## 2023-08-23 PROCEDURE — 93005 ELECTROCARDIOGRAM TRACING: CPT | Performed by: FAMILY MEDICINE

## 2023-08-24 DIAGNOSIS — E03.9 HYPOTHYROIDISM, UNSPECIFIED TYPE: ICD-10-CM

## 2023-08-24 DIAGNOSIS — I10 ESSENTIAL HYPERTENSION: Primary | ICD-10-CM

## 2023-08-24 DIAGNOSIS — R53.83 OTHER FATIGUE: ICD-10-CM

## 2023-08-24 DIAGNOSIS — F41.9 ANXIETY: ICD-10-CM

## 2023-08-25 LAB
ALBUMIN SERPL-MCNC: 4.5 G/DL (ref 3.5–5.2)
ALBUMIN/GLOB SERPL: 2.1 G/DL
ALP SERPL-CCNC: 89 U/L (ref 39–117)
ALT SERPL-CCNC: 16 U/L (ref 1–33)
APPEARANCE UR: ABNORMAL
AST SERPL-CCNC: 15 U/L (ref 1–32)
BACTERIA #/AREA URNS HPF: ABNORMAL /HPF
BASOPHILS # BLD AUTO: 0.08 10*3/MM3 (ref 0–0.2)
BASOPHILS NFR BLD AUTO: 1.3 % (ref 0–1.5)
BILIRUB SERPL-MCNC: 0.3 MG/DL (ref 0–1.2)
BILIRUB UR QL STRIP: NEGATIVE
BUN SERPL-MCNC: 19 MG/DL (ref 6–20)
BUN/CREAT SERPL: 22.4 (ref 7–25)
CALCIUM SERPL-MCNC: 9 MG/DL (ref 8.6–10.5)
CASTS URNS MICRO: ABNORMAL
CHLORIDE SERPL-SCNC: 101 MMOL/L (ref 98–107)
CHOLEST SERPL-MCNC: 255 MG/DL (ref 0–200)
CO2 SERPL-SCNC: 25.6 MMOL/L (ref 22–29)
COLOR UR: ABNORMAL
CREAT SERPL-MCNC: 0.85 MG/DL (ref 0.57–1)
CRYSTALS URNS MICRO: ABNORMAL
EGFRCR SERPLBLD CKD-EPI 2021: 86.2 ML/MIN/1.73
EOSINOPHIL # BLD AUTO: 0.21 10*3/MM3 (ref 0–0.4)
EOSINOPHIL NFR BLD AUTO: 3.5 % (ref 0.3–6.2)
EPI CELLS #/AREA URNS HPF: ABNORMAL /HPF
ERYTHROCYTE [DISTWIDTH] IN BLOOD BY AUTOMATED COUNT: 15.1 % (ref 12.3–15.4)
GLOBULIN SER CALC-MCNC: 2.1 GM/DL
GLUCOSE SERPL-MCNC: 101 MG/DL (ref 65–99)
GLUCOSE UR QL STRIP: NEGATIVE
HCT VFR BLD AUTO: 37.4 % (ref 34–46.6)
HDLC SERPL-MCNC: 49 MG/DL (ref 40–60)
HGB BLD-MCNC: 12.6 G/DL (ref 12–15.9)
HGB UR QL STRIP: NEGATIVE
IMM GRANULOCYTES # BLD AUTO: 0.02 10*3/MM3 (ref 0–0.05)
IMM GRANULOCYTES NFR BLD AUTO: 0.3 % (ref 0–0.5)
KETONES UR QL STRIP: ABNORMAL
LDLC SERPL CALC-MCNC: 178 MG/DL (ref 0–100)
LEUKOCYTE ESTERASE UR QL STRIP: NEGATIVE
LYMPHOCYTES # BLD AUTO: 1.65 10*3/MM3 (ref 0.7–3.1)
LYMPHOCYTES NFR BLD AUTO: 27.5 % (ref 19.6–45.3)
MCH RBC QN AUTO: 27.6 PG (ref 26.6–33)
MCHC RBC AUTO-ENTMCNC: 33.7 G/DL (ref 31.5–35.7)
MCV RBC AUTO: 82 FL (ref 79–97)
MONOCYTES # BLD AUTO: 0.44 10*3/MM3 (ref 0.1–0.9)
MONOCYTES NFR BLD AUTO: 7.3 % (ref 5–12)
NEUTROPHILS # BLD AUTO: 3.59 10*3/MM3 (ref 1.7–7)
NEUTROPHILS NFR BLD AUTO: 60.1 % (ref 42.7–76)
NITRITE UR QL STRIP: NEGATIVE
NRBC BLD AUTO-RTO: 0 /100 WBC (ref 0–0.2)
PH UR STRIP: 6 [PH] (ref 5–8)
PLATELET # BLD AUTO: 272 10*3/MM3 (ref 140–450)
POTASSIUM SERPL-SCNC: 3.7 MMOL/L (ref 3.5–5.2)
PROT SERPL-MCNC: 6.6 G/DL (ref 6–8.5)
PROT UR QL STRIP: ABNORMAL
RBC # BLD AUTO: 4.56 10*6/MM3 (ref 3.77–5.28)
RBC #/AREA URNS HPF: ABNORMAL /HPF
SODIUM SERPL-SCNC: 140 MMOL/L (ref 136–145)
SP GR UR STRIP: ABNORMAL (ref 1–1.03)
T4 FREE SERPL-MCNC: 1.02 NG/DL (ref 0.93–1.7)
TRIGL SERPL-MCNC: 154 MG/DL (ref 0–150)
TSH SERPL DL<=0.005 MIU/L-ACNC: 4.53 UIU/ML (ref 0.27–4.2)
UROBILINOGEN UR STRIP-MCNC: ABNORMAL MG/DL
VLDLC SERPL CALC-MCNC: 28 MG/DL (ref 5–40)
WBC # BLD AUTO: 5.99 10*3/MM3 (ref 3.4–10.8)
WBC #/AREA URNS HPF: ABNORMAL /HPF

## 2023-08-25 RX ORDER — PRAVASTATIN SODIUM 40 MG
40 TABLET ORAL DAILY
Qty: 90 TABLET | Refills: 1 | Status: SHIPPED | OUTPATIENT
Start: 2023-08-25

## 2023-08-26 LAB
QT INTERVAL: 404 MS
QTC INTERVAL: 451 MS

## 2023-08-30 ENCOUNTER — OFFICE VISIT (OUTPATIENT)
Dept: OTOLARYNGOLOGY | Facility: CLINIC | Age: 48
End: 2023-08-30
Payer: COMMERCIAL

## 2023-08-30 VITALS
TEMPERATURE: 98 F | BODY MASS INDEX: 30.39 KG/M2 | DIASTOLIC BLOOD PRESSURE: 85 MMHG | HEIGHT: 64 IN | WEIGHT: 178 LBS | SYSTOLIC BLOOD PRESSURE: 125 MMHG | RESPIRATION RATE: 20 BRPM | HEART RATE: 79 BPM

## 2023-08-30 DIAGNOSIS — Z85.828 ENCOUNTER FOR FOLLOW-UP SURVEILLANCE OF SKIN CANCER: Primary | ICD-10-CM

## 2023-08-30 DIAGNOSIS — L90.5 SCAR CONDITION AND FIBROSIS OF SKIN: ICD-10-CM

## 2023-08-30 DIAGNOSIS — C44.1192 BASAL CELL CARCINOMA OF LOWER EYELID, LEFT: ICD-10-CM

## 2023-08-30 DIAGNOSIS — Z08 ENCOUNTER FOR FOLLOW-UP SURVEILLANCE OF SKIN CANCER: Primary | ICD-10-CM

## 2023-08-30 NOTE — PROGRESS NOTES
Preprocedure diagnosis: Facial scarring    Post procedure diagnosis: Facial scarring    Procedure performed: 1540 nm xD erbium-doped glass laser treatment of scar of the left lower eyelid and cheek    Surgeon: Allen Colbert M.D.    Anesthesia: None    Details: After a thorough discussion of the potential risks of laser therapy including burning to the skin, recurrence, damage to vision, color changes, patient was taken to the procedure room.      The 1540 xD handpiece was placed and set at 15 ms/50 mJ/mB and used to perform 6 passes, while rotating the handpiece prior to each pulse.  Prior to each pulse, the handpiece was rested on the skin with mild pressure for 3 seconds.    The patient tolerated the procedure well.    Electronically signed by Allen Colbert MD, 08/30/23, 1:24 PM CDT.

## 2023-08-30 NOTE — PROGRESS NOTES
PRIMARY CARE PROVIDER: Jacob Simms MD  REFERRING PROVIDER: No ref. provider found    Chief Complaint   Patient presents with    Follow-up     Wound check lower eyelid left f/u       Subjective   History of Present Illness:  Marjorie Barnett is a  47 y.o. female who presents for head neck skin cancer surveillance.  She underwent excision of a basal cell carcinoma of the left lower eyelid and upper cheek with rotational flap reconstruction on May 11, 2023.  She denies any new, or suspicious lesions of the scalp, face, neck.  She is relatively pleased with the result.  She was massaging the areas of fullness, but has not been doing so recently.  She is using Mederma.    Review of Systems:  Review of Systems   Constitutional:  Negative for chills, fatigue, fever and unexpected weight change.   HENT:  Negative for facial swelling.    Respiratory:  Negative for cough, chest tightness and shortness of breath.    Cardiovascular:  Negative for chest pain.   Musculoskeletal:  Negative for neck pain.   Skin:  Negative for color change.   Neurological:  Negative for facial asymmetry.   Hematological:  Negative for adenopathy. Does not bruise/bleed easily.     Past History:  Past Medical History:   Diagnosis Date    Abnormal Pap smear of cervix     Anxiety     Asthma     Bunion, right     Cystitis     Disease of thyroid gland     Hypercholesteremia     Plantar fascia syndrome     Seasonal allergies     Urge incontinence      Past Surgical History:   Procedure Laterality Date     SECTION      x2    CHOLECYSTECTOMY       Family History   Problem Relation Age of Onset    Diabetes Mother     Cancer Mother         kidney    Kidney disease Mother     Hyperlipidemia Mother     Cancer Father         skin    Hyperlipidemia Father     Breast cancer Neg Hx     Ovarian cancer Neg Hx     Colon cancer Neg Hx      Social History     Tobacco Use    Smoking status: Never    Smokeless tobacco: Never   Substance Use Topics     Alcohol use: Yes     Comment: occisonally    Drug use: No     Allergies:  Codeine    Current Outpatient Medications:     albuterol (PROVENTIL HFA;VENTOLIN HFA) 108 (90 BASE) MCG/ACT inhaler, Inhale 2 puffs Every 4 (Four) Hours As Needed for wheezing., Disp: 8 inhaler, Rfl: 1    ALPRAZolam (XANAX) 0.5 MG tablet, TAKE 1 TABLET BY MOUTH 3 (THREE) TIMES A DAY AS NEEDED FOR ANXIETY., Disp: 180 tablet, Rfl: 0    cetirizine (zyrTEC) 10 MG tablet, Take 1 tablet by mouth Daily., Disp: 30 tablet, Rfl: 2    levonorgestrel (Mirena, 52 MG,) 20 MCG/24HR IUD, 1 each by Intrauterine route 1 (One) Time. Inserted by Dr Gutiérrez in 2017, Disp: , Rfl:     lisinopril-hydrochlorothiazide (Zestoretic) 10-12.5 MG per tablet, Take 1 tablet by mouth Daily., Disp: 30 tablet, Rfl: 1    nebivolol (Bystolic) 10 MG tablet, Take 1 tablet by mouth Daily., Disp: 30 tablet, Rfl: 1    pravastatin (Pravachol) 20 MG tablet, Take 1 tablet by mouth Every Night., Disp: 30 tablet, Rfl: 4    pravastatin (Pravachol) 40 MG tablet, Take 1 tablet by mouth Daily., Disp: 90 tablet, Rfl: 1    promethazine (PHENERGAN) 25 MG tablet, Take 1 tablet by mouth Every 6 (Six) Hours As Needed for Nausea or Vomiting., Disp: 10 tablet, Rfl: 0    sertraline (ZOLOFT) 100 MG tablet, TAKE 2 TABLETS BY MOUTH DAILY., Disp: 60 tablet, Rfl: 5    solifenacin (VESICARE) 5 MG tablet, Take 1 tablet by mouth Daily., Disp: 30 tablet, Rfl: 3    Synthroid 100 MCG tablet, Take 1 tablet by mouth Daily., Disp: 30 tablet, Rfl: 3    vitamin D (ERGOCALCIFEROL) 1.25 MG (88177 UT) capsule capsule, Take 1 capsule by mouth 1 (One) Time Per Week., Disp: 5 capsule, Rfl: 2      Objective     Vital Signs:  Temp:  [98 øF (36.7 øC)] 98 øF (36.7 øC)  Heart Rate:  [79] 79  Resp:  [20] 20  BP: (125)/(85) 125/85    Physical Exam:  Physical Exam  Vitals and nursing note reviewed.   Constitutional:       General: She is not in acute distress.     Appearance: She is well-developed. She is not diaphoretic.   HENT:       Head: Normocephalic and atraumatic.      Right Ear: External ear normal.      Left Ear: External ear normal.      Nose: Nose normal.   Eyes:      General: No scleral icterus.        Right eye: No discharge.         Left eye: No discharge.      Conjunctiva/sclera: Conjunctivae normal.      Pupils: Pupils are equal, round, and reactive to light.   Neck:      Thyroid: No thyromegaly.      Vascular: No JVD.      Trachea: No tracheal deviation.   Pulmonary:      Effort: Pulmonary effort is normal.      Breath sounds: No stridor.   Musculoskeletal:         General: No deformity. Normal range of motion.      Cervical back: Normal range of motion and neck supple.   Lymphadenopathy:      Cervical: No cervical adenopathy.   Skin:     General: Skin is warm and dry.      Coloration: Skin is not pale.      Findings: No erythema or rash.   Neurological:      Mental Status: She is alert and oriented to person, place, and time.      Cranial Nerves: No cranial nerve deficit.      Coordination: Coordination normal.   Psychiatric:         Speech: Speech normal.         Behavior: Behavior normal. Behavior is cooperative.         Thought Content: Thought content normal.         Judgment: Judgment normal.       Results Review:       Assessment   Assessment:  1. Encounter for follow-up surveillance of skin cancer    2. Basal cell carcinoma of lower eyelid, left        Plan   Plan:  No new, or suspicious lesions of the scalp, face, neck.  I have offered 1540 laser scar reduction to the left lower eyelid/cheek.  She is interested in proceeding.  I discussed that this is not covered by insurance, and would carry a $50 charge per treatment.  I have recommended 6 treatments.  She is interested in proceeding, and this was initially performed today.  Follow-up in 3 to 4 weeks.    Massage to the area.    My findings and recommendations were discussed and questions were answered.     Allen Colbert MD  08/30/23  13:21 CDT

## 2023-08-30 NOTE — PATIENT INSTRUCTIONS
Care following 1540nm Laser Treatment:    After treatment, it is common to feel a moderate sun-burn like sensation (i.e. burning, stinging, heat).  This typically dissipates over a 2-24 hour period.    Avoid applying makeup for 12 hours after treatment.     Cooling after your 1540 laser treatment is a necessity to prevent thermal trapping in the epidermal layer. Apply cool compresses to the treated area for, 3-5 minutes at a time as needed for one to two days after treatment and until the warm sensation resolves.     CONTACT INFORMATION:  The main office phone number is [phone]. For emergencies after hours and on weekends, this number will convert over to our answering service and the on call provider will answer. Please try to keep non emergent phone calls/ questions to office hours 9am-5pm Monday through Friday.     [employer]  As an alternative, you can sign up and use the Epic MyChart system for more direct and quicker access for non emergent questions/ problems.  [employer] allows you to send messages to your doctor, view your test results, renew your prescriptions, schedule appointments, and more. To sign up, go to [email or web address].    If you have questions, you can email [email or web address] or call [phone] to talk to our [employer] staff. Remember, [employer] is NOT to be used for urgent needs. For medical emergencies, dial 911.

## 2023-09-07 ENCOUNTER — OFFICE VISIT (OUTPATIENT)
Dept: OBSTETRICS AND GYNECOLOGY | Facility: CLINIC | Age: 48
End: 2023-09-07
Payer: COMMERCIAL

## 2023-09-07 VITALS
WEIGHT: 178 LBS | DIASTOLIC BLOOD PRESSURE: 78 MMHG | BODY MASS INDEX: 30.39 KG/M2 | HEIGHT: 64 IN | SYSTOLIC BLOOD PRESSURE: 132 MMHG

## 2023-09-07 DIAGNOSIS — Z12.11 COLON CANCER SCREENING: ICD-10-CM

## 2023-09-07 DIAGNOSIS — Z12.31 ENCOUNTER FOR SCREENING MAMMOGRAM FOR MALIGNANT NEOPLASM OF BREAST: ICD-10-CM

## 2023-09-07 DIAGNOSIS — Z01.419 WELL WOMAN EXAM WITH ROUTINE GYNECOLOGICAL EXAM: Primary | ICD-10-CM

## 2023-09-07 DIAGNOSIS — N39.46 MIXED INCONTINENCE: ICD-10-CM

## 2023-09-07 DIAGNOSIS — Z12.4 CERVICAL CANCER SCREENING: ICD-10-CM

## 2023-09-07 PROCEDURE — 87624 HPV HI-RISK TYP POOLED RSLT: CPT | Performed by: NURSE PRACTITIONER

## 2023-09-07 PROCEDURE — G0123 SCREEN CERV/VAG THIN LAYER: HCPCS | Performed by: NURSE PRACTITIONER

## 2023-09-07 PROCEDURE — 87625 HPV TYPES 16 & 18 ONLY: CPT | Performed by: NURSE PRACTITIONER

## 2023-09-07 NOTE — PROGRESS NOTES
Subjective   Marjorie Barnett is a 48 y.o. female  YOB: 1975        Chief Complaint   Patient presents with    Gynecologic Exam     Pt here for yearly exam. Last exam was done 4/21/22, pap ASCUS, + HPV. Colpo done 8/31/22, bx negative. Last mammo done 11/15/19 at [facility] and was normal.        Gynecologic Exam  The patient's pertinent negatives include no pelvic pain. Pertinent negatives include no abdominal pain, back pain, constipation, diarrhea, dysuria, fever, frequency, hematuria, nausea, rash, sore throat, urgency or vomiting.     The following portions of the patient's history were reviewed and updated as appropriate: allergies, current medications, past family history, past medical history, past social history, past surgical history, and problem list.    Allergies   Allergen Reactions    Codeine Itching and GI Intolerance     Chest pain       Past Medical History:   Diagnosis Date    Abnormal Pap smear of cervix     Anxiety     Asthma     Basal cell carcinoma (BCC) in situ of skin     under left eye    Bunion, right     Cystitis     Disease of thyroid gland     Hypercholesteremia     Plantar fascia syndrome     Seasonal allergies     Urge incontinence        Family History   Problem Relation Age of Onset    Diabetes Mother     Cancer Mother         kidney    Kidney disease Mother     Hyperlipidemia Mother     Cancer Father         skin    Hyperlipidemia Father     Breast cancer Neg Hx     Ovarian cancer Neg Hx     Colon cancer Neg Hx        Social History     Socioeconomic History    Marital status:    Tobacco Use    Smoking status: Never    Smokeless tobacco: Never   Substance and Sexual Activity    Alcohol use: Yes     Comment: occisonally    Drug use: No    Sexual activity: Not Currently     Partners: Male     Birth control/protection: I.U.D.     Comment: Mirena - approx 2017         Current Outpatient Medications:     albuterol (PROVENTIL HFA;VENTOLIN HFA) 108 (90 BASE) MCG/ACT  inhaler, Inhale 2 puffs Every 4 (Four) Hours As Needed for wheezing., Disp: 8 inhaler, Rfl: 1    ALPRAZolam (XANAX) 0.5 MG tablet, TAKE 1 TABLET BY MOUTH 3 (THREE) TIMES A DAY AS NEEDED FOR ANXIETY., Disp: 180 tablet, Rfl: 0    cetirizine (zyrTEC) 10 MG tablet, Take 1 tablet by mouth Daily., Disp: 30 tablet, Rfl: 2    levonorgestrel (Mirena, 52 MG,) 20 MCG/24HR IUD, 1 each by Intrauterine route 1 (One) Time. Inserted by Dr Gutiérrez in 2017, Disp: , Rfl:     lisinopril-hydrochlorothiazide (Zestoretic) 10-12.5 MG per tablet, Take 1 tablet by mouth Daily., Disp: 30 tablet, Rfl: 1    pravastatin (Pravachol) 40 MG tablet, Take 1 tablet by mouth Daily., Disp: 90 tablet, Rfl: 1    promethazine (PHENERGAN) 25 MG tablet, Take 1 tablet by mouth Every 6 (Six) Hours As Needed for Nausea or Vomiting., Disp: 10 tablet, Rfl: 0    sertraline (ZOLOFT) 100 MG tablet, TAKE 2 TABLETS BY MOUTH DAILY., Disp: 60 tablet, Rfl: 5    solifenacin (VESICARE) 5 MG tablet, Take 1 tablet by mouth Daily., Disp: 30 tablet, Rfl: 3    Synthroid 100 MCG tablet, Take 1 tablet by mouth Daily., Disp: 30 tablet, Rfl: 3    vitamin D (ERGOCALCIFEROL) 1.25 MG (60799 UT) capsule capsule, Take 1 capsule by mouth 1 (One) Time Per Week., Disp: 5 capsule, Rfl: 2    nebivolol (Bystolic) 10 MG tablet, Take 1 tablet by mouth Daily., Disp: 30 tablet, Rfl: 1    No LMP recorded. Patient has had an implant.    Sexual History:           Could not be calculated    Past Surgical History:   Procedure Laterality Date     SECTION      x2    CHOLECYSTECTOMY      SKIN LESION EXCISION      basal cell carcinoma under left eye       Review of Systems   Constitutional:  Negative for activity change, appetite change, fatigue, fever, unexpected weight gain and unexpected weight loss.   HENT:  Negative for congestion, ear pain, hearing loss, nosebleeds, rhinorrhea, sore throat, tinnitus and trouble swallowing.    Eyes:  Negative for blurred vision, pain, discharge, itching  and visual disturbance.   Respiratory:  Negative for apnea, chest tightness, shortness of breath and wheezing.    Cardiovascular:  Negative for chest pain and leg swelling.   Gastrointestinal:  Negative for abdominal pain, blood in stool, constipation, diarrhea, nausea, vomiting and GERD.   Endocrine: Negative for heat intolerance, polydipsia and polyuria.   Genitourinary: Negative.  Negative for breast lump, decreased libido, difficulty urinating, dyspareunia, dysuria, frequency, genital sores, hematuria, menstrual problem, pelvic pain, urgency, urinary incontinence and vaginal pain.   Musculoskeletal:  Negative for arthralgias, back pain, joint swelling and myalgias.   Skin:  Negative for color change, rash and skin lesions.   Allergic/Immunologic: Negative for environmental allergies, food allergies and immunocompromised state.   Neurological:  Negative for dizziness, tremors, seizures, syncope, facial asymmetry, numbness and headache.   Hematological:  Negative for adenopathy. Does not bruise/bleed easily.   Psychiatric/Behavioral:  Negative for agitation, hallucinations, sleep disturbance, suicidal ideas and depressed mood. The patient is not nervous/anxious.      Objective   Physical Exam  Vitals reviewed.   Constitutional:       General: She is not in acute distress.     Appearance: She is well-developed. She is not ill-appearing.   HENT:      Head: Normocephalic.      Right Ear: External ear normal.      Left Ear: External ear normal.      Nose: Nose normal.      Mouth/Throat:      Pharynx: No oropharyngeal exudate.   Eyes:      General: No scleral icterus.        Right eye: No discharge.         Left eye: No discharge.      Conjunctiva/sclera: Conjunctivae normal.      Pupils: Pupils are equal, round, and reactive to light.   Neck:      Thyroid: No thyroid mass or thyromegaly.   Cardiovascular:      Rate and Rhythm: Normal rate and regular rhythm.      Heart sounds: Normal heart sounds. No murmur  "heard.  Pulmonary:      Effort: Pulmonary effort is normal. No respiratory distress.      Breath sounds: Normal breath sounds. No wheezing or rales.   Chest:      Chest wall: No tenderness.   Abdominal:      General: Bowel sounds are normal. There is no distension.      Palpations: Abdomen is soft. There is no mass.      Tenderness: There is no abdominal tenderness. There is no guarding or rebound.      Hernia: No hernia is present.   Genitourinary:     Exam position: Prone.      Labia:         Right: No rash, tenderness or lesion.         Left: No rash, tenderness, lesion or injury.       Vagina: Normal. No vaginal discharge or tenderness.      Cervix: No cervical motion tenderness, discharge or friability.      Uterus: Not enlarged and not tender.       Adnexa:         Right: No mass or tenderness.          Left: No mass or tenderness.        Comments: Urethra and urethral meatus normal.    Bladder - normal, no prolapse.  Perineum and rectum examined - intact and no lesions.    Musculoskeletal:         General: No tenderness or deformity. Normal range of motion.      Cervical back: Normal range of motion and neck supple.   Lymphadenopathy:      Cervical: No cervical adenopathy.   Skin:     General: Skin is warm and dry.      Coloration: Skin is not pale.      Findings: No erythema or rash.   Neurological:      Mental Status: She is alert and oriented to person, place, and time.      Motor: No abnormal muscle tone.      Coordination: Coordination normal.      Deep Tendon Reflexes: Reflexes are normal and symmetric.   Psychiatric:         Behavior: Behavior normal. Behavior is cooperative.         Thought Content: Thought content normal.         Judgment: Judgment normal.         Vitals:    09/07/23 1109   BP: 132/78   Weight: 80.7 kg (178 lb)   Height: 162.6 cm (64\")       Diagnoses and all orders for this visit:    1. Well woman exam with routine gynecological exam (Primary)  Comments:  Normal well woman exam.  " ThinPrep pap smear done.  Mammogram ordered.  Orders:  -     Liquid-based Pap Smear, Screening  -     HPV DNA Probe, Direct - ThinPrep Vial, Cervix, Endocervix  -     HPV, Aptima High 16 / 18,45    2. Cervical cancer screening  Comments:  ThinPrep pap smear done.  Orders:  -     Liquid-based Pap Smear, Screening  -     HPV DNA Probe, Direct - ThinPrep Vial, Cervix, Endocervix  -     HPV, Aptima High 16 / 18,45    3. Encounter for screening mammogram for malignant neoplasm of breast  Comments:  Mammogram ordered.  Orders:  -     Mammo Screening Digital Tomosynthesis Bilateral With CAD    4. Mixed incontinence  Comments:  Patient continues to experience mixed incontinence.  Discussed treatment options including InterStim.  Patient is trying alternative therapies for now but will consider.     5. Colon cancer screening  Comments:  Referral to gastroenterology made.  Orders:  -     Ambulatory Referral to Gastroenterology        Normal GYN exam. Will have lab work here. Encouraged SBE.  Pt is aware how to do self breast exam and the importance of same. Discussed weight management and importance of maintaining a healthy weight. Discussed Vitamin D intake and the importance of adequate vitamin D for both bone health and a healthy immune system.  Discussed daily exercise and the importance of same in regards to a healthy heart as well as helping to maintain her weight and improving her mental health.  Body mass index is 30.55 kg/m². Colonoscopy is up to date.  Mammogram will be scheduled at [facility]. Pap smear is done per ASCCP guidelines.                  Non-Smoker    MyChart Instructions Given

## 2023-09-11 LAB
GEN CATEG CVX/VAG CYTO-IMP: ABNORMAL
HPV I/H RISK 4 DNA CVX QL PROBE+SIG AMP: DETECTED
HPV16 DNA SPEC QL NAA+PROBE: NOT DETECTED
HPV18+45 E6+E7 MRNA CVX QL NAA+PROBE: NOT DETECTED
LAB AP CASE REPORT: ABNORMAL
LAB AP GYN ADDITIONAL INFORMATION: ABNORMAL
LAB AP GYN OTHER FINDINGS: ABNORMAL
Lab: ABNORMAL
PATH INTERP SPEC-IMP: ABNORMAL
STAT OF ADQ CVX/VAG CYTO-IMP: ABNORMAL

## 2023-09-11 NOTE — PROGRESS NOTES
Please call patient to answer any questions she has about upcoming colposcopy and to get this scheduled.

## 2023-09-12 DIAGNOSIS — F41.9 ANXIETY: ICD-10-CM

## 2023-09-12 DIAGNOSIS — F98.8 ATTENTION DEFICIT DISORDER (ADD) WITHOUT HYPERACTIVITY: ICD-10-CM

## 2023-09-12 RX ORDER — LISINOPRIL AND HYDROCHLOROTHIAZIDE 12.5; 1 MG/1; MG/1
TABLET ORAL
Qty: 30 TABLET | Refills: 1 | Status: SHIPPED | OUTPATIENT
Start: 2023-09-12

## 2023-09-12 RX ORDER — ALPRAZOLAM 0.5 MG/1
0.5 TABLET ORAL 3 TIMES DAILY PRN
Qty: 180 TABLET | Refills: 0 | Status: SHIPPED | OUTPATIENT
Start: 2023-09-12

## 2023-09-12 RX ORDER — LEVOTHYROXINE SODIUM 100 MCG
100 TABLET ORAL DAILY
Qty: 30 TABLET | Refills: 3 | Status: SHIPPED | OUTPATIENT
Start: 2023-09-12

## 2023-09-12 RX ORDER — DEXTROAMPHETAMINE SACCHARATE, AMPHETAMINE ASPARTATE MONOHYDRATE, DEXTROAMPHETAMINE SULFATE AND AMPHETAMINE SULFATE 5; 5; 5; 5 MG/1; MG/1; MG/1; MG/1
20 CAPSULE, EXTENDED RELEASE ORAL EVERY MORNING
Qty: 30 CAPSULE | Refills: 0 | Status: SHIPPED | OUTPATIENT
Start: 2023-09-12

## 2023-09-12 RX ORDER — SOLIFENACIN SUCCINATE 5 MG/1
5 TABLET, FILM COATED ORAL DAILY
Qty: 30 TABLET | Refills: 3 | Status: SHIPPED | OUTPATIENT
Start: 2023-09-12

## 2023-09-13 ENCOUNTER — TELEPHONE (OUTPATIENT)
Dept: FAMILY MEDICINE CLINIC | Facility: CLINIC | Age: 48
End: 2023-09-13
Payer: COMMERCIAL

## 2023-09-13 RX ORDER — CARVEDILOL 12.5 MG/1
12.5 TABLET ORAL 2 TIMES DAILY WITH MEALS
Qty: 60 TABLET | Refills: 0 | Status: SHIPPED | OUTPATIENT
Start: 2023-09-13

## 2023-09-27 ENCOUNTER — OFFICE VISIT (OUTPATIENT)
Dept: OTOLARYNGOLOGY | Facility: CLINIC | Age: 48
End: 2023-09-27

## 2023-09-27 ENCOUNTER — HOSPITAL ENCOUNTER (OUTPATIENT)
Dept: MAMMOGRAPHY | Facility: HOSPITAL | Age: 48
Discharge: HOME | End: 2023-09-27
Admitting: NURSE PRACTITIONER
Payer: COMMERCIAL

## 2023-09-27 VITALS — HEART RATE: 84 BPM | TEMPERATURE: 97.2 F | SYSTOLIC BLOOD PRESSURE: 103 MMHG | DIASTOLIC BLOOD PRESSURE: 71 MMHG

## 2023-09-27 DIAGNOSIS — L90.5 SCAR CONDITION AND FIBROSIS OF SKIN: Primary | ICD-10-CM

## 2023-09-27 PROCEDURE — 77063 BREAST TOMOSYNTHESIS BI: CPT

## 2023-09-27 PROCEDURE — 77067 SCR MAMMO BI INCL CAD: CPT

## 2023-10-11 DIAGNOSIS — F98.8 ATTENTION DEFICIT DISORDER (ADD) WITHOUT HYPERACTIVITY: ICD-10-CM

## 2023-10-11 RX ORDER — DEXTROAMPHETAMINE SACCHARATE, AMPHETAMINE ASPARTATE MONOHYDRATE, DEXTROAMPHETAMINE SULFATE AND AMPHETAMINE SULFATE 5; 5; 5; 5 MG/1; MG/1; MG/1; MG/1
20 CAPSULE, EXTENDED RELEASE ORAL EVERY MORNING
Qty: 30 CAPSULE | Refills: 0 | Status: SHIPPED | OUTPATIENT
Start: 2023-10-11

## 2023-10-11 RX ORDER — CARVEDILOL 12.5 MG/1
12.5 TABLET ORAL 2 TIMES DAILY WITH MEALS
Qty: 60 TABLET | Refills: 0 | Status: SHIPPED | OUTPATIENT
Start: 2023-10-11

## 2023-10-18 ENCOUNTER — OFFICE VISIT (OUTPATIENT)
Dept: OBSTETRICS AND GYNECOLOGY | Facility: CLINIC | Age: 48
End: 2023-10-18
Payer: COMMERCIAL

## 2023-10-18 ENCOUNTER — OFFICE VISIT (OUTPATIENT)
Dept: GASTROENTEROLOGY | Facility: CLINIC | Age: 48
End: 2023-10-18
Payer: COMMERCIAL

## 2023-10-18 VITALS
WEIGHT: 170 LBS | SYSTOLIC BLOOD PRESSURE: 124 MMHG | HEIGHT: 64 IN | DIASTOLIC BLOOD PRESSURE: 84 MMHG | BODY MASS INDEX: 29.02 KG/M2

## 2023-10-18 VITALS
DIASTOLIC BLOOD PRESSURE: 90 MMHG | HEART RATE: 79 BPM | OXYGEN SATURATION: 98 % | WEIGHT: 172.6 LBS | TEMPERATURE: 96.9 F | BODY MASS INDEX: 29.47 KG/M2 | SYSTOLIC BLOOD PRESSURE: 130 MMHG | HEIGHT: 64 IN

## 2023-10-18 DIAGNOSIS — Z12.11 ENCOUNTER FOR SCREENING FOR MALIGNANT NEOPLASM OF COLON: Primary | ICD-10-CM

## 2023-10-18 DIAGNOSIS — R87.810 ATYPICAL SQUAMOUS CELL CHANGES OF UNDETERMINED SIGNIFICANCE (ASCUS) ON CERVICAL CYTOLOGY WITH POSITIVE HIGH RISK HUMAN PAPILLOMA VIRUS (HPV): Primary | ICD-10-CM

## 2023-10-18 DIAGNOSIS — R87.610 ATYPICAL SQUAMOUS CELL CHANGES OF UNDETERMINED SIGNIFICANCE (ASCUS) ON CERVICAL CYTOLOGY WITH POSITIVE HIGH RISK HUMAN PAPILLOMA VIRUS (HPV): Primary | ICD-10-CM

## 2023-10-18 RX ORDER — SODIUM, POTASSIUM,MAG SULFATES 17.5-3.13G
SOLUTION, RECONSTITUTED, ORAL ORAL
Qty: 177 ML | Refills: 0 | Status: SHIPPED | OUTPATIENT
Start: 2023-10-18

## 2023-10-18 NOTE — PROGRESS NOTES
Marjorie Barnett  1975      10/18/2023  Chief Complaint   Patient presents with    Colonoscopy     Colon screening     Subjective   HPI  Marjorie Barnett is a 48 y.o. female who presents as a referral for preventative maintenance. She has no complaints of nausea or vomiting. No change in bowels. No wt loss. No BRBPR. No melena. There is NO family hx for colon cancer. No abdominal pain.  Past Medical History:   Diagnosis Date    Abnormal Pap smear of cervix     Anxiety     Asthma     Basal cell carcinoma (BCC) in situ of skin     under left eye    Bunion, right     Cystitis     Disease of thyroid gland     Hashimoto's disease     Hypercholesteremia     Plantar fascia syndrome     Seasonal allergies     Urge incontinence      Past Surgical History:   Procedure Laterality Date     SECTION      x2    CHOLECYSTECTOMY      SKIN LESION EXCISION      basal cell carcinoma under left eye     Outpatient Medications Marked as Taking for the 10/18/23 encounter (Office Visit) with Lupis James APRN   Medication Sig Dispense Refill    albuterol (PROVENTIL HFA;VENTOLIN HFA) 108 (90 BASE) MCG/ACT inhaler Inhale 2 puffs Every 4 (Four) Hours As Needed for wheezing. 8 inhaler 1    ALPRAZolam (XANAX) 0.5 MG tablet TAKE 1 TABLET BY MOUTH 3 (THREE) TIMES A DAY AS NEEDED FOR ANXIETY. 180 tablet 0    carvedilol (COREG) 12.5 MG tablet TAKE 1 TABLET BY MOUTH 2 (TWO) TIMES A DAY WITH MEALS. 60 tablet 0    cetirizine (zyrTEC) 10 MG tablet Take 1 tablet by mouth Daily. 30 tablet 2    levonorgestrel (Mirena, 52 MG,) 20 MCG/24HR IUD 1 each by Intrauterine route 1 (One) Time. Inserted by Dr Gutiérrez in 2017      lisinopril-hydrochlorothiazide (PRINZIDE,ZESTORETIC) 10-12.5 MG per tablet TAKE ONE TABLET DAILY GENERIC FOR ZESTORETIC 30 tablet 1    nebivolol (Bystolic) 10 MG tablet Take 1 tablet by mouth Daily. 30 tablet 1    promethazine (PHENERGAN) 25 MG tablet Take 1 tablet by mouth Every 6 (Six) Hours As Needed for Nausea or  Vomiting. 10 tablet 0    sertraline (ZOLOFT) 100 MG tablet TAKE 2 TABLETS BY MOUTH DAILY. 60 tablet 5    solifenacin (VESICARE) 5 MG tablet TAKE 1 TABLET BY MOUTH DAILY. 30 tablet 3    Synthroid 100 MCG tablet TAKE 1 TABLET BY MOUTH DAILY. 30 tablet 3    vitamin D (ERGOCALCIFEROL) 1.25 MG (30259 UT) capsule capsule Take 1 capsule by mouth 1 (One) Time Per Week. 5 capsule 2     Allergies   Allergen Reactions    Codeine Itching and GI Intolerance     Chest pain     Social History     Socioeconomic History    Marital status:    Tobacco Use    Smoking status: Never    Smokeless tobacco: Never   Vaping Use    Vaping Use: Never used   Substance and Sexual Activity    Alcohol use: Yes     Comment: occisonally    Drug use: No    Sexual activity: Not Currently     Partners: Male     Birth control/protection: I.U.D.     Comment: Mirena - approx 2017     Family History   Problem Relation Age of Onset    Diabetes Mother     Cancer Mother         kidney    Kidney disease Mother     Hyperlipidemia Mother     Cancer Father         skin    Hyperlipidemia Father     Breast cancer Neg Hx     Ovarian cancer Neg Hx     Colon cancer Neg Hx     Colon polyps Neg Hx      Health Maintenance   Topic Date Due    COLORECTAL CANCER SCREENING  Never done    TDAP/TD VACCINES (1 - Tdap) Never done    HEPATITIS C SCREENING  Never done    ANNUAL PHYSICAL  Never done    INFLUENZA VACCINE  08/01/2023    COVID-19 Vaccine (4 - 2023-24 season) 09/01/2023    BMI FOLLOWUP  03/09/2024    LIPID PANEL  08/24/2024    PAP SMEAR  09/07/2026    Pneumococcal Vaccine 0-64  Aged Out       REVIEW OF SYSTEMS  General: well appearing, no fever chills or sweats, no unexplained wt loss  HEENT: no acute visual or hearing disturbances  Cardiovascular: No chest pain or palpitations  Pulmonary: No shortness of breath, coughing, wheezing or hemoptysis  : No burning, urgency, hematuria, or dysuria  Musculoskeletal: No joint pain or stiffness  Peripheral: no  "edema  Skin: No lesions or rashes  Neuro: No dizziness, headaches, stroke, syncope  Endocrine: No hot or cold intolerances  Hematological: No blood dyscrasias    Objective   Vitals:    10/18/23 1303   BP: 130/90   BP Location: Left arm   Pulse: 79   Temp: 96.9 °F (36.1 °C)   TempSrc: Temporal   SpO2: 98%   Weight: 78.3 kg (172 lb 9.6 oz)   Height: 162.6 cm (64.02\")     Body mass index is 29.61 kg/m².         PHYSICAL EXAM  General: age appropriate well nourished well appearing, no acute distress  Head: normocephalic and atraumatic  Global assessment-supple  Neck-No JVD noted, no lymphadenopathy  Pulmonary-clear to auscultation bilaterally, normal respiratory effort  Cardiovascular-normal rate and rhythm, normal heart sounds, S1 and S2 noted  Abdomen-soft, non tender, non distended, normal bowel sounds all 4 quadrants, no hepatosplenomegaly noted  Extremities-No clubbing cyanosis or edema  Neuro-Non focal, converses appropriately, awake, alert, oriented    Assessment & Plan     Diagnoses and all orders for this visit:    1. Encounter for screening for malignant neoplasm of colon (Primary)  -     Case Request; Standing  -     Case Request  -     sodium-potassium-magnesium sulfates (Suprep Bowel Prep Kit) 17.5-3.13-1.6 GM/177ML solution oral solution; Take as directed by office instructions provided  Dispense: 177 mL; Refill: 0    Other orders  -     Implement Anesthesia Orders Day of Procedure; Standing  -     Obtain Informed Consent; Standing  -     Follow Anesthesia Guidelines / Protocol; Future  -     Obtain Informed Consent; Future  -     Verify bowel prep was successful; Standing        COLONOSCOPY WITH ANESTHESIA (N/A)  Body mass index is 29.61 kg/m².    Patient instructions on prep prior to procedure provided to the patient.    All risks, benefits, alternatives, and indications of colonoscopy procedure have been discussed with the patient. Risks to include perforation of the colon requiring possible surgery or " colostomy, risk of bleeding from biopsies or removal of colon tissue, possibility of missing a colon polyp or cancer, or adverse drug reaction.  Benefits to include the diagnosis and management of disease of the colon and rectum. Alternatives to include barium enema, radiographic evaluation, lab testing or no intervention. Pt verbalizes understanding and agrees.     Lupis James, APRN  10/18/2023  13:13 CDT      IF YOU SMOKE OR USE TOBACCO PLEASE READ THE FOLLOWIN minutes reading provided    Why is smoking bad for me?  Smoking increases the risk of heart disease, lung disease, vascular disease, stroke, and cancer.     If you smoke, STOP!    If you would like more information on quitting smoking, please visit the [employer] website: [email or web address]   This link will provide additional resources including the QUIT line and the Beat the Pack support groups.     For more information:    Quit Now [place]  -QUIT-NOW  [email or web address]

## 2023-10-18 NOTE — H&P (VIEW-ONLY)
Marjorie Barnett  1975      10/18/2023  Chief Complaint   Patient presents with    Colonoscopy     Colon screening     Subjective   HPI  Marjorie Barnett is a 48 y.o. female who presents as a referral for preventative maintenance. She has no complaints of nausea or vomiting. No change in bowels. No wt loss. No BRBPR. No melena. There is NO family hx for colon cancer. No abdominal pain.  Past Medical History:   Diagnosis Date    Abnormal Pap smear of cervix     Anxiety     Asthma     Basal cell carcinoma (BCC) in situ of skin     under left eye    Bunion, right     Cystitis     Disease of thyroid gland     Hashimoto's disease     Hypercholesteremia     Plantar fascia syndrome     Seasonal allergies     Urge incontinence      Past Surgical History:   Procedure Laterality Date     SECTION      x2    CHOLECYSTECTOMY      SKIN LESION EXCISION      basal cell carcinoma under left eye     Outpatient Medications Marked as Taking for the 10/18/23 encounter (Office Visit) with Lupis James APRN   Medication Sig Dispense Refill    albuterol (PROVENTIL HFA;VENTOLIN HFA) 108 (90 BASE) MCG/ACT inhaler Inhale 2 puffs Every 4 (Four) Hours As Needed for wheezing. 8 inhaler 1    ALPRAZolam (XANAX) 0.5 MG tablet TAKE 1 TABLET BY MOUTH 3 (THREE) TIMES A DAY AS NEEDED FOR ANXIETY. 180 tablet 0    carvedilol (COREG) 12.5 MG tablet TAKE 1 TABLET BY MOUTH 2 (TWO) TIMES A DAY WITH MEALS. 60 tablet 0    cetirizine (zyrTEC) 10 MG tablet Take 1 tablet by mouth Daily. 30 tablet 2    levonorgestrel (Mirena, 52 MG,) 20 MCG/24HR IUD 1 each by Intrauterine route 1 (One) Time. Inserted by Dr Gutiérrez in 2017      lisinopril-hydrochlorothiazide (PRINZIDE,ZESTORETIC) 10-12.5 MG per tablet TAKE ONE TABLET DAILY GENERIC FOR ZESTORETIC 30 tablet 1    nebivolol (Bystolic) 10 MG tablet Take 1 tablet by mouth Daily. 30 tablet 1    promethazine (PHENERGAN) 25 MG tablet Take 1 tablet by mouth Every 6 (Six) Hours As Needed for Nausea or  Vomiting. 10 tablet 0    sertraline (ZOLOFT) 100 MG tablet TAKE 2 TABLETS BY MOUTH DAILY. 60 tablet 5    solifenacin (VESICARE) 5 MG tablet TAKE 1 TABLET BY MOUTH DAILY. 30 tablet 3    Synthroid 100 MCG tablet TAKE 1 TABLET BY MOUTH DAILY. 30 tablet 3    vitamin D (ERGOCALCIFEROL) 1.25 MG (00329 UT) capsule capsule Take 1 capsule by mouth 1 (One) Time Per Week. 5 capsule 2     Allergies   Allergen Reactions    Codeine Itching and GI Intolerance     Chest pain     Social History     Socioeconomic History    Marital status:    Tobacco Use    Smoking status: Never    Smokeless tobacco: Never   Vaping Use    Vaping Use: Never used   Substance and Sexual Activity    Alcohol use: Yes     Comment: occisonally    Drug use: No    Sexual activity: Not Currently     Partners: Male     Birth control/protection: I.U.D.     Comment: Mirena - approx 2017     Family History   Problem Relation Age of Onset    Diabetes Mother     Cancer Mother         kidney    Kidney disease Mother     Hyperlipidemia Mother     Cancer Father         skin    Hyperlipidemia Father     Breast cancer Neg Hx     Ovarian cancer Neg Hx     Colon cancer Neg Hx     Colon polyps Neg Hx      Health Maintenance   Topic Date Due    COLORECTAL CANCER SCREENING  Never done    TDAP/TD VACCINES (1 - Tdap) Never done    HEPATITIS C SCREENING  Never done    ANNUAL PHYSICAL  Never done    INFLUENZA VACCINE  08/01/2023    COVID-19 Vaccine (4 - 2023-24 season) 09/01/2023    BMI FOLLOWUP  03/09/2024    LIPID PANEL  08/24/2024    PAP SMEAR  09/07/2026    Pneumococcal Vaccine 0-64  Aged Out       REVIEW OF SYSTEMS  General: well appearing, no fever chills or sweats, no unexplained wt loss  HEENT: no acute visual or hearing disturbances  Cardiovascular: No chest pain or palpitations  Pulmonary: No shortness of breath, coughing, wheezing or hemoptysis  : No burning, urgency, hematuria, or dysuria  Musculoskeletal: No joint pain or stiffness  Peripheral: no  "edema  Skin: No lesions or rashes  Neuro: No dizziness, headaches, stroke, syncope  Endocrine: No hot or cold intolerances  Hematological: No blood dyscrasias    Objective   Vitals:    10/18/23 1303   BP: 130/90   BP Location: Left arm   Pulse: 79   Temp: 96.9 °F (36.1 °C)   TempSrc: Temporal   SpO2: 98%   Weight: 78.3 kg (172 lb 9.6 oz)   Height: 162.6 cm (64.02\")     Body mass index is 29.61 kg/m².         PHYSICAL EXAM  General: age appropriate well nourished well appearing, no acute distress  Head: normocephalic and atraumatic  Global assessment-supple  Neck-No JVD noted, no lymphadenopathy  Pulmonary-clear to auscultation bilaterally, normal respiratory effort  Cardiovascular-normal rate and rhythm, normal heart sounds, S1 and S2 noted  Abdomen-soft, non tender, non distended, normal bowel sounds all 4 quadrants, no hepatosplenomegaly noted  Extremities-No clubbing cyanosis or edema  Neuro-Non focal, converses appropriately, awake, alert, oriented    Assessment & Plan     Diagnoses and all orders for this visit:    1. Encounter for screening for malignant neoplasm of colon (Primary)  -     Case Request; Standing  -     Case Request  -     sodium-potassium-magnesium sulfates (Suprep Bowel Prep Kit) 17.5-3.13-1.6 GM/177ML solution oral solution; Take as directed by office instructions provided  Dispense: 177 mL; Refill: 0    Other orders  -     Implement Anesthesia Orders Day of Procedure; Standing  -     Obtain Informed Consent; Standing  -     Follow Anesthesia Guidelines / Protocol; Future  -     Obtain Informed Consent; Future  -     Verify bowel prep was successful; Standing        COLONOSCOPY WITH ANESTHESIA (N/A)  Body mass index is 29.61 kg/m².    Patient instructions on prep prior to procedure provided to the patient.    All risks, benefits, alternatives, and indications of colonoscopy procedure have been discussed with the patient. Risks to include perforation of the colon requiring possible surgery or " colostomy, risk of bleeding from biopsies or removal of colon tissue, possibility of missing a colon polyp or cancer, or adverse drug reaction.  Benefits to include the diagnosis and management of disease of the colon and rectum. Alternatives to include barium enema, radiographic evaluation, lab testing or no intervention. Pt verbalizes understanding and agrees.     Lupis James, APRN  10/18/2023  13:13 CDT      IF YOU SMOKE OR USE TOBACCO PLEASE READ THE FOLLOWIN minutes reading provided    Why is smoking bad for me?  Smoking increases the risk of heart disease, lung disease, vascular disease, stroke, and cancer.     If you smoke, STOP!    If you would like more information on quitting smoking, please visit the [employer] website: [email or web address]   This link will provide additional resources including the QUIT line and the Beat the Pack support groups.     For more information:    Quit Now [place]  -QUIT-NOW  [email or web address]

## 2023-10-18 NOTE — PROGRESS NOTES
Colposcopy    Date of procedure:  10/18/2023    Risks and benefits discussed? yes  All questions answered? yes  Consents given by the patient  Written consent obtained? yes    Local anesthesia used:  no    Pre-op indication: ASCUS with POSITIVE high risk HPV  Procedure documentation:    The cervix was initially viewed colposcopically through a green filter.  The cervix was next bathed in acetic acid.   The findings were as follows:      The transformation zone was able to be seen adequately.    No visible lesions    Ectocervical biopsies were not performed    An ECC was not performed.    Patient tolerated the procedure well.      Colposcopic Impression: Adequate colposcopy  Colposcopic findings are consistent with PAP       Plan: Repeat Pap smear in 1 year      This note was electronically signed.    Roni Looney MD  October 18, 2023

## 2023-10-20 RX ORDER — LISINOPRIL AND HYDROCHLOROTHIAZIDE 20; 12.5 MG/1; MG/1
1 TABLET ORAL DAILY
Qty: 30 TABLET | Refills: 5 | Status: SHIPPED | OUTPATIENT
Start: 2023-10-20

## 2023-11-08 ENCOUNTER — HOSPITAL ENCOUNTER (OUTPATIENT)
Facility: HOSPITAL | Age: 48
Setting detail: HOSPITAL OUTPATIENT SURGERY
Discharge: HOME | End: 2023-11-08
Attending: INTERNAL MEDICINE | Admitting: INTERNAL MEDICINE
Payer: COMMERCIAL

## 2023-11-08 ENCOUNTER — ANESTHESIA EVENT (OUTPATIENT)
Dept: GASTROENTEROLOGY | Facility: HOSPITAL | Age: 48
End: 2023-11-08
Payer: COMMERCIAL

## 2023-11-08 ENCOUNTER — TELEPHONE (OUTPATIENT)
Dept: GASTROENTEROLOGY | Facility: CLINIC | Age: 48
End: 2023-11-08
Payer: COMMERCIAL

## 2023-11-08 ENCOUNTER — ANESTHESIA (OUTPATIENT)
Dept: GASTROENTEROLOGY | Facility: HOSPITAL | Age: 48
End: 2023-11-08
Payer: COMMERCIAL

## 2023-11-08 VITALS
TEMPERATURE: 96.8 F | HEART RATE: 76 BPM | HEIGHT: 64 IN | OXYGEN SATURATION: 100 % | SYSTOLIC BLOOD PRESSURE: 114 MMHG | RESPIRATION RATE: 20 BRPM | DIASTOLIC BLOOD PRESSURE: 83 MMHG | BODY MASS INDEX: 29.37 KG/M2 | WEIGHT: 172 LBS

## 2023-11-08 LAB — HCG SERPL QL: NEGATIVE

## 2023-11-08 PROCEDURE — 84703 CHORIONIC GONADOTROPIN ASSAY: CPT | Performed by: ANESTHESIOLOGY

## 2023-11-08 PROCEDURE — 45378 DIAGNOSTIC COLONOSCOPY: CPT | Performed by: INTERNAL MEDICINE

## 2023-11-08 PROCEDURE — 25810000003 SODIUM CHLORIDE 0.9 % SOLUTION: Performed by: ANESTHESIOLOGY

## 2023-11-08 PROCEDURE — 25010000002 PROPOFOL 10 MG/ML EMULSION: Performed by: NURSE ANESTHETIST, CERTIFIED REGISTERED

## 2023-11-08 RX ORDER — SODIUM CHLORIDE 0.9 % (FLUSH) 0.9 %
10 SYRINGE (ML) INJECTION EVERY 12 HOURS SCHEDULED
Status: CANCELLED | OUTPATIENT
Start: 2023-11-08

## 2023-11-08 RX ORDER — LIDOCAINE HYDROCHLORIDE 20 MG/ML
INJECTION, SOLUTION EPIDURAL; INFILTRATION; INTRACAUDAL; PERINEURAL AS NEEDED
Status: DISCONTINUED | OUTPATIENT
Start: 2023-11-08 | End: 2023-11-08 | Stop reason: SURG

## 2023-11-08 RX ORDER — SODIUM CHLORIDE 0.9 % (FLUSH) 0.9 %
10 SYRINGE (ML) INJECTION AS NEEDED
Status: CANCELLED | OUTPATIENT
Start: 2023-11-08

## 2023-11-08 RX ORDER — SODIUM CHLORIDE 9 MG/ML
100 INJECTION, SOLUTION INTRAVENOUS CONTINUOUS
Status: DISCONTINUED | OUTPATIENT
Start: 2023-11-08 | End: 2023-11-08 | Stop reason: HOSPADM

## 2023-11-08 RX ORDER — SODIUM CHLORIDE 9 MG/ML
40 INJECTION, SOLUTION INTRAVENOUS AS NEEDED
Status: CANCELLED | OUTPATIENT
Start: 2023-11-08

## 2023-11-08 RX ORDER — PROPOFOL 10 MG/ML
VIAL (ML) INTRAVENOUS AS NEEDED
Status: DISCONTINUED | OUTPATIENT
Start: 2023-11-08 | End: 2023-11-08 | Stop reason: SURG

## 2023-11-08 RX ADMIN — PROPOFOL INJECTABLE EMULSION 700 MG: 10 INJECTION, EMULSION INTRAVENOUS at 09:21

## 2023-11-08 RX ADMIN — SODIUM CHLORIDE 100 ML/HR: 9 INJECTION, SOLUTION INTRAVENOUS at 08:26

## 2023-11-08 RX ADMIN — LIDOCAINE HYDROCHLORIDE 100 MG: 20 INJECTION, SOLUTION EPIDURAL; INFILTRATION; INTRACAUDAL; PERINEURAL at 09:21

## 2023-11-08 NOTE — ANESTHESIA POSTPROCEDURE EVALUATION
Neurosurgery Patient: Marjorie Barnett    Procedure Summary       Date: 11/08/23 Room / Location: [facility] ENDOSCOPY 6 / [facility] END[facility]    Anesthesia Start: 0919 Anesthesia Stop: 0942    Procedure: COLONOSCOPY WITH ANESTHESIA Diagnosis:       Encounter for screening for malignant neoplasm of colon      (Encounter for screening for malignant neoplasm of colon [Z12.11])    Surgeons: Uzma Newberry MD Provider: SILVANA Matson CRNA    Anesthesia Type: MAC ASA Status: 2            Anesthesia Type: MAC    Vitals  Vitals Value Taken Time   BP     Temp     Pulse 91 11/08/23 0942   Resp     SpO2 94 % 11/08/23 0942   Vitals shown include unfiled device data.        Post Anesthesia Care and Evaluation    Patient location during evaluation: PACU  Patient participation: complete - patient participated  Level of consciousness: awake and alert  Pain score: 0  Pain management: adequate    Airway patency: patent  Anesthetic complications: No anesthetic complications    Cardiovascular status: acceptable and stable  Respiratory status: acceptable and unassisted  Hydration status: acceptable

## 2023-11-08 NOTE — NURSING NOTE
Pt unable to give urine sample for hcg, order discontinued. Order for serum hcg obtained and speciman sent

## 2023-11-14 DIAGNOSIS — F41.9 ANXIETY: ICD-10-CM

## 2023-11-15 RX ORDER — ALPRAZOLAM 0.5 MG/1
0.5 TABLET ORAL 3 TIMES DAILY PRN
Qty: 180 TABLET | Refills: 0 | Status: SHIPPED | OUTPATIENT
Start: 2023-11-15

## 2023-11-15 RX ORDER — CARVEDILOL 12.5 MG/1
12.5 TABLET ORAL 2 TIMES DAILY WITH MEALS
Qty: 60 TABLET | Refills: 0 | Status: SHIPPED | OUTPATIENT
Start: 2023-11-15

## 2023-11-15 RX ORDER — LISINOPRIL AND HYDROCHLOROTHIAZIDE 12.5; 1 MG/1; MG/1
TABLET ORAL
Qty: 30 TABLET | Refills: 1 | Status: SHIPPED | OUTPATIENT
Start: 2023-11-15

## 2023-12-13 RX ORDER — LEVOTHYROXINE SODIUM 100 MCG
100 TABLET ORAL DAILY
Qty: 30 TABLET | Refills: 3 | Status: SHIPPED | OUTPATIENT
Start: 2023-12-13

## 2023-12-13 RX ORDER — SOLIFENACIN SUCCINATE 5 MG/1
5 TABLET, FILM COATED ORAL DAILY
Qty: 30 TABLET | Refills: 3 | Status: SHIPPED | OUTPATIENT
Start: 2023-12-13

## 2023-12-20 RX ORDER — PROMETHAZINE HYDROCHLORIDE 25 MG/1
25 TABLET ORAL EVERY 6 HOURS PRN
Qty: 10 TABLET | Refills: 0 | Status: SHIPPED | OUTPATIENT
Start: 2023-12-20

## 2024-01-10 DIAGNOSIS — F41.9 ANXIETY: ICD-10-CM

## 2024-01-10 RX ORDER — ALPRAZOLAM 0.5 MG/1
0.5 TABLET ORAL 3 TIMES DAILY PRN
Qty: 180 TABLET | Refills: 0 | Status: SHIPPED | OUTPATIENT
Start: 2024-01-10

## 2024-01-10 RX ORDER — CARVEDILOL 12.5 MG/1
12.5 TABLET ORAL 2 TIMES DAILY WITH MEALS
Qty: 60 TABLET | Refills: 0 | Status: SHIPPED | OUTPATIENT
Start: 2024-01-10

## 2024-02-08 RX ORDER — SERTRALINE HYDROCHLORIDE 100 MG/1
200 TABLET, FILM COATED ORAL DAILY
Qty: 60 TABLET | Refills: 5 | Status: SHIPPED | OUTPATIENT
Start: 2024-02-08

## 2024-02-09 RX ORDER — ALBUTEROL SULFATE 90 UG/1
2 AEROSOL, METERED RESPIRATORY (INHALATION) EVERY 4 HOURS PRN
Qty: 6.7 G | Refills: 5 | Status: SHIPPED | OUTPATIENT
Start: 2024-02-09

## 2024-02-09 NOTE — TELEPHONE ENCOUNTER
Rx Refill Note  Requested Prescriptions     Pending Prescriptions Disp Refills    albuterol sulfate  (90 Base) MCG/ACT inhaler       Sig: Inhale 2 puffs Every 4 (Four) Hours As Needed for Wheezing.      Last office visit with prescribing clinician: 8/16/2023   Last telemedicine visit with prescribing clinician: Visit date not found   Next office visit with prescribing clinician: Visit date not found                         Would you like a call back once the refill request has been completed: [] Yes [] No    If the office needs to give you a call back, can they leave a voicemail: [] Yes [] No    Nadege Brock, PCT  02/09/24, 08:33 CST

## 2024-02-15 ENCOUNTER — PATIENT MESSAGE (OUTPATIENT)
Dept: FAMILY MEDICINE CLINIC | Facility: CLINIC | Age: 49
End: 2024-02-15
Payer: COMMERCIAL

## 2024-02-15 DIAGNOSIS — E03.9 HYPOTHYROIDISM, UNSPECIFIED TYPE: Primary | ICD-10-CM

## 2024-03-08 DIAGNOSIS — F41.9 ANXIETY: ICD-10-CM

## 2024-03-08 RX ORDER — ALPRAZOLAM 0.5 MG/1
0.5 TABLET ORAL 3 TIMES DAILY PRN
Qty: 180 TABLET | Refills: 0 | Status: SHIPPED | OUTPATIENT
Start: 2024-03-08

## 2024-03-08 RX ORDER — LISINOPRIL AND HYDROCHLOROTHIAZIDE 20; 12.5 MG/1; MG/1
1 TABLET ORAL DAILY
Qty: 30 TABLET | Refills: 5 | Status: SHIPPED | OUTPATIENT
Start: 2024-03-08

## 2024-03-09 NOTE — ADDENDUM NOTE
Addended by: JENNIFER SALTER on: 3/3/2017 12:50 PM     Modules accepted: Orders     Physical Therapy                 Therapy Communication Note    Patient Name: Mary Pedroza  MRN: [id number]  Today's Date: 3/9/2024     Discipline: Physical Therapy    Missed Visit Reason: Missed Visit Reason: Other (Comment) (Pt to OR on Monday - hold therapy) Will re-attempt after surgery.     Missed Time: Cancel    Comment:

## 2024-03-15 RX ORDER — PROMETHAZINE HYDROCHLORIDE 25 MG/1
25 TABLET ORAL EVERY 6 HOURS PRN
Qty: 10 TABLET | Refills: 0 | Status: SHIPPED | OUTPATIENT
Start: 2024-03-15

## 2024-04-05 RX ORDER — LEVOTHYROXINE SODIUM 100 MCG
100 TABLET ORAL DAILY
Qty: 30 TABLET | Refills: 3 | Status: SHIPPED | OUTPATIENT
Start: 2024-04-05

## 2024-04-05 RX ORDER — SOLIFENACIN SUCCINATE 5 MG/1
TABLET, FILM COATED ORAL
Qty: 30 TABLET | Refills: 3 | Status: SHIPPED | OUTPATIENT
Start: 2024-04-05

## 2024-04-19 ENCOUNTER — PATIENT MESSAGE (OUTPATIENT)
Dept: FAMILY MEDICINE CLINIC | Facility: CLINIC | Age: 49
End: 2024-04-19
Payer: COMMERCIAL

## 2024-04-19 RX ORDER — PROMETHAZINE HYDROCHLORIDE 25 MG/1
25 TABLET ORAL EVERY 8 HOURS PRN
Qty: 10 TABLET | Refills: 1 | Status: SHIPPED | OUTPATIENT
Start: 2024-04-19

## 2024-05-15 DIAGNOSIS — F41.9 ANXIETY: ICD-10-CM

## 2024-05-15 RX ORDER — ALPRAZOLAM 0.5 MG/1
0.5 TABLET ORAL 3 TIMES DAILY PRN
Qty: 180 TABLET | Refills: 0 | Status: SHIPPED | OUTPATIENT
Start: 2024-05-15

## 2024-08-08 ENCOUNTER — OFFICE VISIT (OUTPATIENT)
Dept: FAMILY MEDICINE CLINIC | Facility: CLINIC | Age: 49
End: 2024-08-08
Payer: COMMERCIAL

## 2024-08-08 VITALS
HEIGHT: 63 IN | WEIGHT: 211 LBS | SYSTOLIC BLOOD PRESSURE: 102 MMHG | BODY MASS INDEX: 37.39 KG/M2 | DIASTOLIC BLOOD PRESSURE: 64 MMHG | OXYGEN SATURATION: 98 % | HEART RATE: 75 BPM

## 2024-08-08 DIAGNOSIS — R60.0 LEG EDEMA: ICD-10-CM

## 2024-08-08 DIAGNOSIS — R35.0 URINARY FREQUENCY: ICD-10-CM

## 2024-08-08 DIAGNOSIS — N39.0 RECURRENT UTI: ICD-10-CM

## 2024-08-08 PROCEDURE — 99214 OFFICE O/P EST MOD 30 MIN: CPT | Performed by: FAMILY MEDICINE

## 2024-08-08 RX ORDER — AMPICILLIN 500 MG/1
500 CAPSULE ORAL 3 TIMES DAILY
Qty: 21 CAPSULE | Refills: 0 | Status: SHIPPED | OUTPATIENT
Start: 2024-08-08

## 2024-08-08 NOTE — PROGRESS NOTES
KERRI”.   Subjective   Marjorie Barnett is a 48 y.o. female presenting with chief complaint of:   Chief Complaint   Patient presents with    Urinary Tract Infection     Pt is complaining of frequent urination, pressure symptoms began Monday.  Swelling in both legs mainly right.     AWV   Last Completed Annual Wellness Visit       This patient has no relevant Health Maintenance data.             History of Present Illness :  Alone.  Here for primarily an acute issue today; dysuria, urgency, frequency 2-3 days.  No hematuria, fever, vaginal discharge.       Has multiple chronic problems to consider that might have a bearing on today's issues; not an interval appointment.       Chronic/acute problems reviewed today:   1. Recurrent UTI history of   2. Urinary frequency see above.  No fever   3. Leg edema recently started having end of day mild edema of both legs.  This is the most she is overweight.  No recent travel.  No known varicosities; has been pregnant.     Has an/another acute issue today: none.    The following portions of the patient's history were reviewed and updated as appropriate: allergies, current medications, past family history, past medical history, past social history, past surgical history, and problem list.      Current Outpatient Medications:     albuterol sulfate  (90 Base) MCG/ACT inhaler, Inhale 2 puffs Every 4 (Four) Hours As Needed for Wheezing., Disp: 6.7 g, Rfl: 5    ALPRAZolam (XANAX) 0.5 MG tablet, TAKE 1 TABLET BY MOUTH 3 (THREE) TIMES A DAY AS NEEDED FOR ANXIETY., Disp: 180 tablet, Rfl: 0    carvedilol (COREG) 12.5 MG tablet, TAKE 1 TABLET BY MOUTH 2 (TWO) TIMES A DAY WITH MEALS., Disp: 60 tablet, Rfl: 0    cetirizine (zyrTEC) 10 MG tablet, Take 1 tablet by mouth Daily., Disp: 30 tablet, Rfl: 2    levonorgestrel (Mirena, 52 MG,) 20 MCG/24HR IUD, 1 each by Intrauterine route 1 (One) Time. Inserted by Dr Gutiérrez in 2017, Disp: , Rfl:     promethazine (PHENERGAN) 25 MG  tablet, Take 1 tablet by mouth Every 8 (Eight) Hours As Needed for Nausea or Vomiting., Disp: 10 tablet, Rfl: 1    sertraline (ZOLOFT) 100 MG tablet, TAKE 2 TABLETS BY MOUTH DAILY., Disp: 60 tablet, Rfl: 5    solifenacin (VESICARE) 5 MG tablet, TAKE ONE TABLET DAILY GENERIC FOR VESICARE, Disp: 30 tablet, Rfl: 3    Synthroid 100 MCG tablet, TAKE 1 TABLET BY MOUTH DAILY., Disp: 30 tablet, Rfl: 3    vitamin D (ERGOCALCIFEROL) 1.25 MG (49888 UT) capsule capsule, Take 1 capsule by mouth 1 (One) Time Per Week., Disp: 5 capsule, Rfl: 2    lisinopril-hydrochlorothiazide (PRINZIDE,ZESTORETIC) 10-12.5 MG per tablet, TAKE ONE TABLET DAILY GENERIC FOR ZESTORETIC, Disp: 30 tablet, Rfl: 1    lisinopril-hydrochlorothiazide (PRINZIDE,ZESTORETIC) 20-12.5 MG per tablet, TAKE 1 TABLET BY MOUTH DAILY., Disp: 30 tablet, Rfl: 5    nebivolol (Bystolic) 10 MG tablet, Take 1 tablet by mouth Daily., Disp: 30 tablet, Rfl: 1    promethazine (PHENERGAN) 25 MG tablet, Take 1 tablet by mouth Every 6 (Six) Hours As Needed for Nausea or Vomiting., Disp: 10 tablet, Rfl: 0    No problems with medications.    Allergies   Allergen Reactions    Codeine Itching and GI Intolerance     Chest pain       Review of Systems   Cardiovascular:  Positive for leg swelling. Negative for chest pain and palpitations.   Gastrointestinal: Negative.    Genitourinary:  Positive for dysuria, frequency and urgency. Negative for flank pain, hematuria, menstrual problem, vaginal bleeding, vaginal discharge and vaginal pain.       Screening:  Mammogram:   Last Completed Mammogram            MAMMOGRAM (Every 2 Years) Next due on 9/27/2025 09/27/2023  Mammo Screening Digital Tomosynthesis Bilateral With CAD    11/15/2019  Mammo Screening Digital Tomosynthesis Bilateral With CAD    10/12/2018  Mammo screening digital tomosynthesis bilateral w CAD                     Copy/paste function used for ROS/exam AND each area of these were reviewed, updated, confirmed and  "supplemented as needed.  Data reviewed:   Recent admit/ER/MD visits: none  Last cardiac testing:   Echo:   Results for orders placed during the hospital encounter of 03/10/20    Adult Stress Echo W/ Cont or Stress Agent if Necessary Per Protocol    Interpretation Summary  · Low risk for ischemia      Radiology considered:   No radiology results for the last 90 days.    Lab Results:  Results for orders placed or performed in visit on 02/21/24   TSH    Specimen: Blood   Result Value Ref Range    TSH 3.570 0.270 - 4.200 uIU/mL   T4, free    Specimen: Blood   Result Value Ref Range    Free T4 1.02 0.92 - 1.68 ng/dL       A1C:  Lab Results - Last 18 Months   Lab Units 06/07/23  0744   HEMOGLOBIN A1C % 5.50     GLUCOSE:  Lab Results - Last 18 Months   Lab Units 08/24/23  0852 06/07/23  0744 05/10/23  0829   GLUCOSE mg/dL 101* 99 100*     LIPID:  Lab Results - Last 18 Months   Lab Units 08/24/23  0852 05/10/23  0829   CHOLESTEROL mg/dL 255* 257*   LDL CHOL mg/dL 178* 180*   HDL CHOL mg/dL 49 46   TRIGLYCERIDES mg/dL 154* 168*     PSA:No results found for: \"PSA\"    CBC:  Lab Results - Last 18 Months   Lab Units 08/24/23  0852 06/07/23  0744 05/10/23  0829   WBC 10*3/mm3 5.99  --  5.63   HEMOGLOBIN g/dL 12.6  --  13.2   HEMATOCRIT % 37.4  --  41.5   PLATELETS 10*3/mm3 272  --  310   VITAMIN B 12 pg/mL  --  505  --      BMP/CMP:  Lab Results - Last 18 Months   Lab Units 08/24/23  0852 06/21/23  1153 06/07/23  0744 05/10/23  0829   SODIUM mmol/L 140  --   --  142   POTASSIUM mmol/L 3.7  --   --  4.0   CHLORIDE mmol/L 101  --   --  103   CO2 mmol/L 25.6  --   --  30.7*   GLUCOSE mg/dL 101*  --  99 100*   BUN mg/dL 19  --   --  11   CREATININE mg/dL 0.85  --   --  0.95   EGFR RESULT mL/min/1.73 86.2  --   --  75.5   CALCIUM mg/dL 9.0 9.4  --  9.8       HEPATIC:  Lab Results - Last 18 Months   Lab Units 08/24/23  0852 05/10/23  0829   ALT (SGPT) U/L 16 15   AST (SGOT) U/L 15 10   ALK PHOS U/L 89 131*     HEPATITIS C ANTIBODY: No " "results found for: \"HEPCVIRUSABY\"  Vit D:  Lab Results - Last 18 Months   Lab Units 06/07/23  0744   VIT D 25 HYDROXY ng/ml 21.2*     THYROID:  Lab Results - Last 18 Months   Lab Units 06/21/24  1443 08/24/23  0852 07/27/23  0806 06/07/23  0744 05/10/23  0829   TSH uIU/mL 3.570 4.530* 3.700 0.200* 0.158*   FREE T4 ng/dL 1.02 1.02 0.90* 1.24  --        Objective   /64   Pulse 75   Ht 160 cm (63\")   Wt 95.7 kg (211 lb)   SpO2 98%   BMI 37.38 kg/m²   Body mass index is 37.38 kg/m².    Recent Vitals         11/8/2023 6/10/2024 8/8/2024       BP: 114/83 129/77 102/64     Pulse: 76 81 75     Temp: -- 98.3 °F (36.8 °C) --     Weight: -- 92.2 kg (203 lb 3.2 oz) 95.7 kg (211 lb)     BMI (Calculated): -- 36 37.4             Physical Exam  Vitals and nursing note reviewed.   Constitutional:       Appearance: She is obese. She is not toxic-appearing.   HENT:      Head: Normocephalic and atraumatic.      Right Ear: Tympanic membrane, ear canal and external ear normal.      Left Ear: Tympanic membrane, ear canal and external ear normal.      Mouth/Throat:      Mouth: Mucous membranes are moist.      Pharynx: No oropharyngeal exudate or posterior oropharyngeal erythema.   Eyes:      Conjunctiva/sclera: Conjunctivae normal.      Pupils: Pupils are equal, round, and reactive to light.   Cardiovascular:      Rate and Rhythm: Normal rate and regular rhythm.      Heart sounds: Normal heart sounds.   Pulmonary:      Effort: Pulmonary effort is normal.      Breath sounds: Normal breath sounds.   Abdominal:      Palpations: Abdomen is soft.      Tenderness: There is no abdominal tenderness.   Musculoskeletal:      Cervical back: Neck supple.      Right lower leg: No edema.      Left lower leg: No edema.   Skin:     General: Skin is dry.      Findings: Lesion present. No rash.   Neurological:      Mental Status: She is alert and oriented to person, place, and time. Mental status is at baseline.       Assessment & Plan     1. " Recurrent UTI    2. Urinary frequency    3. Leg edema      Data review above:   Discussions/medical decisions/reviews:  Recent Vitals         11/8/2023 6/10/2024 8/8/2024       BP: 114/83 129/77 102/64     Pulse: 76 81 75     Temp: -- 98.3 °F (36.8 °C) --     Weight: -- 92.2 kg (203 lb 3.2 oz) 95.7 kg (211 lb)     BMI (Calculated): -- 36 37.4             Wt Readings from Last 15 Encounters:   08/08/24 1511 95.7 kg (211 lb)   06/10/24 0952 92.2 kg (203 lb 3.2 oz)   11/08/23 0806 78 kg (172 lb)   10/18/23 1303 78.3 kg (172 lb 9.6 oz)   10/18/23 0909 77.1 kg (170 lb)   09/07/23 1109 80.7 kg (178 lb)   08/30/23 0950 80.7 kg (178 lb)   08/16/23 0921 80.7 kg (178 lb)   05/11/23 0905 79.8 kg (176 lb)   05/05/23 1055 79.8 kg (176 lb)   04/24/23 1321 83.9 kg (185 lb)   03/09/23 0830 80.7 kg (178 lb)   09/07/22 0805 86.2 kg (190 lb)   08/31/22 1354 85.3 kg (188 lb)   04/21/22 0938 89.8 kg (198 lb)       BP ok  Other vitals ok  Weight most ever    Start labs; if find leads for leg edema/weight gain proceed; if no leads maybe trial something weigh loss  She agreed emperic ampicillin 500 tid #21; call if worses  Labs including culture pending  Discussed upcoming prison of me/Dr Barry.  Discussed new physician, Dr Arjun Rodríguez coming as replacement. Decision to: stay at /[facility].    Follow up: Return for as planned/will see.  Future Appointments   Date Time Provider Department Center   10/23/2024  8:30 AM Roni Looney MD [facility]       Data review above:   Rx: reviewed and decisions:   Rx new/changes:   New Medications Ordered This Visit   Medications    ampicillin (PRINCIPEN) 500 MG capsule     Sig: Take 1 capsule by mouth 3 times a day.     Dispense:  21 capsule     Refill:  0     Orders placed:   LAB/Testing/Referrals: reviewed/orders:   Today:   Orders Placed This Encounter   Procedures    Comprehensive Metabolic Panel    TSH Rfx On Abnormal To Free T4    Urinalysis With Culture If Indicated -    CBC &  "Differential     Chronic/recurrent labs above or change to:   Same     Immunization History   Administered Date(s) Administered    COVID-19 (MODERNA) 1st,2nd,3rd Dose Monovalent 01/14/2021, 02/11/2021, 12/02/2021    Fluzone (or Fluarix & Flulaval for VFC) >6mos 11/19/2020    Influenza, Unspecified 10/15/2019     We advised/reaffirmed our support/suggestion for staying complete with covid- covid boosters, seasonal flu/yearly and any missing vaccine from list we supplied; when cannot be given here we suggest contact with local health department office or pharmacy to review missing/needed vaccines and then bring nursing documentation for these vaccines to this office or call this information in. Shingles became \"free\" 1.1.23 for medicare insurance.    Health maintenance:   Body mass index is 37.38 kg/m².  Class 2 Severe Obesity (BMI >=35 and <=39.9). Obesity-related health conditions include the following: GERD. Obesity is improving with treatment. BMI is is above average; BMI management plan is completed. We discussed portion control, increasing exercise, and pharmacologic options including wevgovy?  .    Tobacco use reviewed:     Marjorie Barnett  reports that she has never smoked. She has never used smokeless tobacco.   There are no Patient Instructions on file for this visit.          "

## 2024-08-10 LAB
ALBUMIN SERPL-MCNC: 4.2 G/DL (ref 3.5–5.2)
ALBUMIN/GLOB SERPL: 1.8 G/DL
ALP SERPL-CCNC: 100 U/L (ref 39–117)
ALT SERPL-CCNC: 13 U/L (ref 1–33)
APPEARANCE UR: ABNORMAL
AST SERPL-CCNC: 11 U/L (ref 1–32)
BACTERIA #/AREA URNS HPF: ABNORMAL /[HPF]
BACTERIA UR CULT: NORMAL
BACTERIA UR CULT: NORMAL
BASOPHILS # BLD AUTO: 0.06 10*3/MM3 (ref 0–0.2)
BASOPHILS NFR BLD AUTO: 0.9 % (ref 0–1.5)
BILIRUB SERPL-MCNC: <0.2 MG/DL (ref 0–1.2)
BUN SERPL-MCNC: 14 MG/DL (ref 6–20)
BUN/CREAT SERPL: 17.5 (ref 7–25)
CALCIUM SERPL-MCNC: 9.4 MG/DL (ref 8.6–10.5)
CASTS URNS QL MICRO: ABNORMAL /LPF
CHLORIDE SERPL-SCNC: 102 MMOL/L (ref 98–107)
CO2 SERPL-SCNC: 27.8 MMOL/L (ref 22–29)
COLOR UR: YELLOW
CREAT SERPL-MCNC: 0.8 MG/DL (ref 0.57–1)
EGFRCR SERPLBLD CKD-EPI 2021: 92.2 ML/MIN/1.73
EOSINOPHIL # BLD AUTO: 0.3 10*3/MM3 (ref 0–0.4)
EOSINOPHIL NFR BLD AUTO: 4.4 % (ref 0.3–6.2)
EPI CELLS #/AREA URNS HPF: ABNORMAL /HPF (ref 0–10)
ERYTHROCYTE [DISTWIDTH] IN BLOOD BY AUTOMATED COUNT: 14.6 % (ref 12.3–15.4)
GLOBULIN SER CALC-MCNC: 2.3 GM/DL
GLUCOSE SERPL-MCNC: 95 MG/DL (ref 65–99)
GLUCOSE UR QL STRIP: ABNORMAL
HCT VFR BLD AUTO: 35.3 % (ref 34–46.6)
HGB BLD-MCNC: 11.6 G/DL (ref 12–15.9)
IMM GRANULOCYTES # BLD AUTO: 0.04 10*3/MM3 (ref 0–0.05)
IMM GRANULOCYTES NFR BLD AUTO: 0.6 % (ref 0–0.5)
KETONES UR QL STRIP: ABNORMAL
LYMPHOCYTES # BLD AUTO: 1.96 10*3/MM3 (ref 0.7–3.1)
LYMPHOCYTES NFR BLD AUTO: 28.7 % (ref 19.6–45.3)
MCH RBC QN AUTO: 27.2 PG (ref 26.6–33)
MCHC RBC AUTO-ENTMCNC: 32.9 G/DL (ref 31.5–35.7)
MCV RBC AUTO: 82.9 FL (ref 79–97)
MICRO URNS: ABNORMAL
MONOCYTES # BLD AUTO: 0.5 10*3/MM3 (ref 0.1–0.9)
MONOCYTES NFR BLD AUTO: 7.3 % (ref 5–12)
NEUTROPHILS # BLD AUTO: 3.96 10*3/MM3 (ref 1.7–7)
NEUTROPHILS NFR BLD AUTO: 58.1 % (ref 42.7–76)
NRBC BLD AUTO-RTO: 0 /100 WBC (ref 0–0.2)
PH UR STRIP: ABNORMAL [PH]
PLATELET # BLD AUTO: 286 10*3/MM3 (ref 140–450)
POTASSIUM SERPL-SCNC: 3.5 MMOL/L (ref 3.5–5.2)
PROT SERPL-MCNC: 6.5 G/DL (ref 6–8.5)
PROT UR QL STRIP: ABNORMAL
RBC # BLD AUTO: 4.26 10*6/MM3 (ref 3.77–5.28)
RBC #/AREA URNS HPF: ABNORMAL /HPF (ref 0–2)
SODIUM SERPL-SCNC: 139 MMOL/L (ref 136–145)
SP GR UR STRIP: ABNORMAL
TSH SERPL DL<=0.005 MIU/L-ACNC: 1.31 UIU/ML (ref 0.27–4.2)
URINALYSIS REFLEX: ABNORMAL
WBC # BLD AUTO: 6.82 10*3/MM3 (ref 3.4–10.8)
WBC #/AREA URNS HPF: ABNORMAL /HPF (ref 0–5)

## 2024-08-12 ENCOUNTER — TELEPHONE (OUTPATIENT)
Dept: FAMILY MEDICINE CLINIC | Facility: CLINIC | Age: 49
End: 2024-08-12
Payer: COMMERCIAL

## 2024-08-12 DIAGNOSIS — R31.0 GROSS HEMATURIA: Primary | ICD-10-CM

## 2024-08-12 NOTE — TELEPHONE ENCOUNTER
Ordred her CT kidney stone protocal as soon as we can get done  Go to  ER if fever, heavy bleeding or pain    She cannot be pregnant and do this xrays

## 2024-08-12 NOTE — TELEPHONE ENCOUNTER
----- Message from Nadege CASE sent at 8/12/2024  4:08 PM CDT -----  Spoke to patient she states the script is not helping, she is still experiencing blood in her urine.

## 2024-08-13 ENCOUNTER — HOSPITAL ENCOUNTER (OUTPATIENT)
Dept: CT IMAGING | Facility: HOSPITAL | Age: 49
Discharge: HOME | End: 2024-08-13
Admitting: FAMILY MEDICINE
Payer: COMMERCIAL

## 2024-08-13 ENCOUNTER — TELEPHONE (OUTPATIENT)
Dept: FAMILY MEDICINE CLINIC | Facility: CLINIC | Age: 49
End: 2024-08-13
Payer: COMMERCIAL

## 2024-08-13 DIAGNOSIS — R31.0 GROSS HEMATURIA: ICD-10-CM

## 2024-08-13 PROCEDURE — 74176 CT ABD & PELVIS W/O CONTRAST: CPT

## 2024-08-13 NOTE — TELEPHONE ENCOUNTER
----- Message from Lucy CONTI sent at 8/13/2024  3:24 PM CDT -----  The pain is better today,she has pressure in her left vaginal area and pain all across her belly into her left hip area and no fever.

## 2024-08-13 NOTE — TELEPHONE ENCOUNTER
Advise she call/see her gyne   Hope she can/agrees    Why  A. Urine culture neg  B. No help with abx  C. CT neg urinary   D. Pain in pelvis/vaginal area     Feel she needs gyne exam

## 2024-08-13 NOTE — TELEPHONE ENCOUNTER
Patient is aware, she would like to know if she can have a water pill for her swelling?    Patient is scheduled for CT today at 1:30 at [facility], no prep.

## 2024-08-20 DIAGNOSIS — F41.9 ANXIETY: ICD-10-CM

## 2024-08-20 RX ORDER — LEVOTHYROXINE SODIUM 100 MCG
100 TABLET ORAL DAILY
Qty: 30 TABLET | Refills: 3 | Status: SHIPPED | OUTPATIENT
Start: 2024-08-20

## 2024-08-20 RX ORDER — ALPRAZOLAM 0.5 MG/1
0.5 TABLET ORAL 3 TIMES DAILY PRN
Qty: 180 TABLET | Refills: 3 | Status: SHIPPED | OUTPATIENT
Start: 2024-08-20

## 2024-08-20 RX ORDER — SERTRALINE HYDROCHLORIDE 100 MG/1
200 TABLET, FILM COATED ORAL DAILY
Qty: 60 TABLET | Refills: 3 | Status: SHIPPED | OUTPATIENT
Start: 2024-08-20

## 2024-08-20 RX ORDER — SOLIFENACIN SUCCINATE 5 MG/1
TABLET, FILM COATED ORAL
Qty: 30 TABLET | Refills: 3 | Status: SHIPPED | OUTPATIENT
Start: 2024-08-20

## 2024-08-20 NOTE — TELEPHONE ENCOUNTER
Rx Refill Note  Requested Prescriptions     Pending Prescriptions Disp Refills    Synthroid 100 MCG tablet [Pharmacy Med Name: SYNTHROID 100MCG TABLET] 30 tablet 3     Sig: TAKE 1 TABLET BY MOUTH DAILY.    sertraline (ZOLOFT) 100 MG tablet [Pharmacy Med Name: SERTRALINE HYDROCHLORIDE 100MG TABLET] 60 tablet 3     Sig: TAKE 2 TABLETS BY MOUTH DAILY.    ALPRAZolam (XANAX) 0.5 MG tablet [Pharmacy Med Name: ALPRAZOLAM 0.5MG TABLET] 180 tablet 3     Sig: TAKE 1 TABLET BY MOUTH 3 (THREE) TIMES A DAY AS NEEDED FOR ANXIETY.      Last office visit with office: 08/08/24  Next office visit with office: none    UDS: none    DATE OF LAST REFILL: 05/15/24    Controlled Substance Agreement: not up to date    TIMI OR DEVON: 08/20/24         {TIP  Is Refill Pharmacy correct?:  Lucy Hernandez MA  08/20/24, 11:39 CDT

## 2024-08-29 ENCOUNTER — PATIENT MESSAGE (OUTPATIENT)
Dept: FAMILY MEDICINE CLINIC | Facility: CLINIC | Age: 49
End: 2024-08-29
Payer: COMMERCIAL

## 2024-08-29 NOTE — TELEPHONE ENCOUNTER
From: Marjorie Barnett  To: Martin Barry  Sent: 8/29/2024 8:57 AM CDT  Subject: Wegovy     This was mentioned in my last visit with Dr Barry. I wanted to think about it but I am willing to try it if my insurance will cover it. If you could send it in to [facility] I would greatly appreciate it. Thank you.

## 2024-09-16 NOTE — TELEPHONE ENCOUNTER
During check out was making appointment for Rheumatology, patient and wife stated the Dr. Bush shared that there is one that comes here or in the area.  On [facility] [employer] found a few and tried calling to schedule at [place] but was on hold for quite sometime.  Patient asked if we could just find him an appointment and reach out to him on [employer] or Text him.  His wife said on her phone they have one in [place].  Shared that if they want to go to [facility] we could put an outside order in.  Patient stated that he is not going to call around and fond one that we should and let him know.  Patient left as he waited for me to get through to schedule and it was after 5:00.  Please could you call the patient regarding scheduling for Rheumatology and who Dr. Bush may have been thinking of in the area.    Pt called and said that she thinks she has a uti she has burning and pressure and blood in her urine she asked for meds to be called in?also she stated that she is having some inncontience issues again she siad at one time u had referred her to dr andres and things gotten better but now the issues are coming back?? [phone]

## 2024-10-23 ENCOUNTER — OFFICE VISIT (OUTPATIENT)
Age: 49
End: 2024-10-23
Payer: COMMERCIAL

## 2024-10-23 VITALS
DIASTOLIC BLOOD PRESSURE: 78 MMHG | HEIGHT: 63 IN | WEIGHT: 208.3 LBS | BODY MASS INDEX: 36.91 KG/M2 | SYSTOLIC BLOOD PRESSURE: 114 MMHG

## 2024-10-23 DIAGNOSIS — R10.2 PELVIC PAIN: ICD-10-CM

## 2024-10-23 DIAGNOSIS — Z12.31 SCREENING MAMMOGRAM FOR BREAST CANCER: ICD-10-CM

## 2024-10-23 DIAGNOSIS — Z12.4 ENCOUNTER FOR SCREENING FOR CERVICAL CANCER: ICD-10-CM

## 2024-10-23 DIAGNOSIS — Z78.9 NON-SMOKER: ICD-10-CM

## 2024-10-23 DIAGNOSIS — Z01.419 WELL WOMAN EXAM WITH ROUTINE GYNECOLOGICAL EXAM: Primary | ICD-10-CM

## 2024-10-23 PROCEDURE — 87624 HPV HI-RISK TYP POOLED RSLT: CPT | Performed by: OBSTETRICS & GYNECOLOGY

## 2024-10-23 PROCEDURE — 87591 N.GONORRHOEAE DNA AMP PROB: CPT | Performed by: OBSTETRICS & GYNECOLOGY

## 2024-10-23 PROCEDURE — 87491 CHLMYD TRACH DNA AMP PROBE: CPT | Performed by: OBSTETRICS & GYNECOLOGY

## 2024-10-23 PROCEDURE — 87661 TRICHOMONAS VAGINALIS AMPLIF: CPT | Performed by: OBSTETRICS & GYNECOLOGY

## 2024-10-23 NOTE — PROGRESS NOTES
"Chief Complaint  Gynecologic Exam (Pt here for annual and c/o vaginal pressure more on the left side that worsened since the summer, last pap 09/07/2023 ASCUS/+HPV, had colpo with you 10/18/2023 no bx done, repeat pap today, last mammogram 09/27/2023, last colonoscopy 11/08/2023)    Subjective        Marjorie Barnett presents to [facility]  History of Present Illness    Patient presents today for yearly exam  Self breast exam, diet, exercise, multivitamin use all discussed  All of her questions were answered to her liking  [facility] guidelines were reviewed for Pap smear surveillance  She is due for her Pap smear    She does complain of some vaginal pressure, especially left-sided  She does report constipation      Objective   Vital Signs:  /78 (BP Location: Left arm, Patient Position: Sitting, Cuff Size: Large Adult)   Ht 160 cm (63\")   Wt 94.5 kg (208 lb 4.8 oz)   BMI 36.90 kg/m²   Estimated body mass index is 36.9 kg/m² as calculated from the following:    Height as of this encounter: 160 cm (63\").    Weight as of this encounter: 94.5 kg (208 lb 4.8 oz).            Physical Exam  Vitals and nursing note reviewed. Exam conducted with a chaperone present.   Constitutional:       Appearance: Normal appearance.   HENT:      Head: Normocephalic and atraumatic.      Mouth/Throat:      Mouth: Mucous membranes are moist.   Eyes:      Extraocular Movements: Extraocular movements intact.      Pupils: Pupils are equal, round, and reactive to light.   Neck:      Vascular: No carotid bruit.   Cardiovascular:      Rate and Rhythm: Normal rate and regular rhythm.      Pulses: Normal pulses.      Heart sounds: Normal heart sounds. No murmur heard.     No friction rub. No gallop.   Pulmonary:      Effort: Pulmonary effort is normal. No respiratory distress.      Breath sounds: Normal breath sounds. No stridor. No wheezing, rhonchi or rales.   Chest:      Chest wall: No tenderness.   Breasts:     " Breasts are symmetrical.      Right: Normal. No swelling, bleeding, inverted nipple, mass, nipple discharge, skin change or tenderness.      Left: Normal. No swelling, bleeding, inverted nipple, mass, nipple discharge, skin change or tenderness.   Abdominal:      General: Abdomen is flat. Bowel sounds are normal. There is no distension.      Palpations: Abdomen is soft. There is no mass.      Tenderness: There is no abdominal tenderness. There is no right CVA tenderness, left CVA tenderness, guarding or rebound.      Hernia: No hernia is present. There is no hernia in the left inguinal area or right inguinal area.   Genitourinary:     General: Normal vulva.      Exam position: Lithotomy position.      Pubic Area: No rash or pubic lice.       Labia:         Right: No rash, tenderness, lesion or injury.         Left: No rash, tenderness, lesion or injury.       Urethra: No prolapse, urethral pain, urethral swelling or urethral lesion.      Vagina: Normal.      Cervix: Normal.      Uterus: Normal. Not deviated, not enlarged, not fixed, not tender and no uterine prolapse.       Adnexa: Right adnexa normal and left adnexa normal.        Right: No mass, tenderness or fullness.          Left: No mass, tenderness or fullness.        Comments: No abnormalities seen or felt on exam  Musculoskeletal:         General: Normal range of motion.      Cervical back: Normal range of motion and neck supple. No rigidity. No muscular tenderness.   Lymphadenopathy:      Cervical: No cervical adenopathy.      Upper Body:      Right upper body: No supraclavicular, axillary or pectoral adenopathy.      Left upper body: No supraclavicular, axillary or pectoral adenopathy.      Lower Body: No right inguinal adenopathy. No left inguinal adenopathy.   Skin:     General: Skin is warm and dry.   Neurological:      General: No focal deficit present.      Mental Status: She is alert and oriented to person, place, and time. Mental status is at  baseline.   Psychiatric:         Mood and Affect: Mood normal.         Behavior: Behavior normal.         Thought Content: Thought content normal.         Judgment: Judgment normal.        Result Review :                Assessment and Plan   Diagnoses and all orders for this visit:    1. Well woman exam with routine gynecological exam (Primary)    2. Encounter for screening for cervical cancer  -     Liquid-based Pap Smear, Screening    3. Screening mammogram for breast cancer  -     Mammo Screening Digital Tomosynthesis Bilateral With CAD    4. Pelvic pain    5. Non-smoker             Follow Up   Return in about 1 month (around 11/23/2024) for With Gyn U/S, with me.  Patient was given instructions and counseling regarding her condition or for health maintenance advice. Please see specific information pulled into the AVS if appropriate.     Follow-up on Pap smear  Return to office for ultrasound  Colposcopy if indicated    Roni Looney MD

## 2024-10-24 LAB
C TRACH RRNA CVX QL NAA+PROBE: NOT DETECTED
N GONORRHOEA RRNA SPEC QL NAA+PROBE: NOT DETECTED
TRICHOMONAS VAGINALIS PCR: NOT DETECTED

## 2024-10-28 LAB
GEN CATEG CVX/VAG CYTO-IMP: NORMAL
HPV I/H RISK 4 DNA CVX QL PROBE+SIG AMP: NOT DETECTED
LAB AP CASE REPORT: NORMAL
LAB AP GYN ADDITIONAL INFORMATION: NORMAL
LAB AP GYN OTHER FINDINGS: NORMAL
Lab: NORMAL
PATH INTERP SPEC-IMP: NORMAL
STAT OF ADQ CVX/VAG CYTO-IMP: NORMAL

## 2024-12-17 RX ORDER — SOLIFENACIN SUCCINATE 5 MG/1
5 TABLET, FILM COATED ORAL DAILY
Qty: 30 TABLET | Refills: 2 | Status: SHIPPED | OUTPATIENT
Start: 2024-12-17

## 2024-12-17 RX ORDER — SERTRALINE HYDROCHLORIDE 100 MG/1
200 TABLET, FILM COATED ORAL DAILY
Qty: 60 TABLET | Refills: 3 | Status: SHIPPED | OUTPATIENT
Start: 2024-12-17 | End: 2024-12-19

## 2024-12-19 RX ORDER — LEVOTHYROXINE SODIUM 100 MCG
100 TABLET ORAL DAILY
Qty: 30 TABLET | Refills: 3 | Status: SHIPPED | OUTPATIENT
Start: 2024-12-19

## 2024-12-19 RX ORDER — SERTRALINE HYDROCHLORIDE 100 MG/1
200 TABLET, FILM COATED ORAL DAILY
Qty: 60 TABLET | Refills: 3 | Status: SHIPPED | OUTPATIENT
Start: 2024-12-19

## 2024-12-26 ENCOUNTER — TELEPHONE (OUTPATIENT)
Dept: FAMILY MEDICINE CLINIC | Facility: CLINIC | Age: 49
End: 2024-12-26

## 2024-12-26 NOTE — TELEPHONE ENCOUNTER
Caller: Marjorie Barnett    Relationship to patient: Self    Best call back number: [phone]       Type of visit: HOSPITAL FOLLOW UP    Requested date: NEEDS BY 12/31      Additional notes:WAS DISCHARGED FROM [facility] ON 12/24. WAS PREVIOUSLY A PATIENT OF DR. LUND AND SAW DR. KAUR. PLEASE CALL TO SCHEDULE.

## 2024-12-31 ENCOUNTER — TELEPHONE (OUTPATIENT)
Age: 49
End: 2024-12-31
Payer: COMMERCIAL

## 2024-12-31 NOTE — TELEPHONE ENCOUNTER
Called to f/u with pt on mammogram order, no answer, left voicemail to return my call at her earliest convenience.

## 2025-01-08 ENCOUNTER — OFFICE VISIT (OUTPATIENT)
Dept: FAMILY MEDICINE CLINIC | Facility: CLINIC | Age: 50
End: 2025-01-08
Payer: COMMERCIAL

## 2025-01-08 VITALS
OXYGEN SATURATION: 98 % | HEART RATE: 92 BPM | BODY MASS INDEX: 35.9 KG/M2 | SYSTOLIC BLOOD PRESSURE: 136 MMHG | WEIGHT: 202.6 LBS | RESPIRATION RATE: 16 BRPM | DIASTOLIC BLOOD PRESSURE: 78 MMHG | HEIGHT: 63 IN

## 2025-01-08 DIAGNOSIS — M54.32 LEFT SCIATIC NERVE PAIN: Primary | ICD-10-CM

## 2025-01-08 DIAGNOSIS — I10 ESSENTIAL HYPERTENSION: ICD-10-CM

## 2025-01-08 DIAGNOSIS — E03.9 HYPOTHYROIDISM, UNSPECIFIED TYPE: ICD-10-CM

## 2025-01-08 DIAGNOSIS — Z23 ENCOUNTER FOR IMMUNIZATION: ICD-10-CM

## 2025-01-08 PROCEDURE — 99214 OFFICE O/P EST MOD 30 MIN: CPT | Performed by: NURSE PRACTITIONER

## 2025-01-08 RX ORDER — GABAPENTIN 100 MG/1
100 CAPSULE ORAL 3 TIMES DAILY
Qty: 90 CAPSULE | Refills: 0 | Status: SHIPPED | OUTPATIENT
Start: 2025-01-08

## 2025-01-08 RX ORDER — DEXAMETHASONE 1.5 MG 1.5 MG/1
1.5 TABLET ORAL TAKE AS DIRECTED
Qty: 51 EACH | Refills: 0 | Status: SHIPPED | OUTPATIENT
Start: 2025-01-08

## 2025-01-14 ENCOUNTER — PATIENT MESSAGE (OUTPATIENT)
Dept: FAMILY MEDICINE CLINIC | Facility: CLINIC | Age: 50
End: 2025-01-14
Payer: COMMERCIAL

## 2025-01-14 DIAGNOSIS — M54.32 LEFT SCIATIC NERVE PAIN: Primary | ICD-10-CM

## 2025-01-14 DIAGNOSIS — G89.29 CHRONIC NECK PAIN: ICD-10-CM

## 2025-01-14 DIAGNOSIS — M54.2 CHRONIC NECK PAIN: ICD-10-CM

## 2025-01-15 NOTE — PROGRESS NOTES
Subjective   Chief Complaint:  Back and leg pain    History of Present Illness  The patient is a 49-year-old female presenting today with left-sided low back pain shooting down the left leg.    She has not had any recent falls or injuries, but she does recount a fall and trauma several years ago in the same facility. She reports she has been to the ER and was given steroids, but pain came back right after the steroids were done. She reports she has got been going to physical therapy and has 3 weeks left. She reports she is doing home exercises and home stretches as well. Hx htn, due for lab    Past Medical, Surgical, Social, and Family History:  Allergies   Allergen Reactions    Codeine Itching and GI Intolerance     Chest pain      Past Medical History:   Diagnosis Date    Abnormal Pap smear of cervix     Anxiety     Asthma     Basal cell carcinoma (BCC) in situ of skin     under left eye    Bunion, right     Cystitis     Disease of thyroid gland     Hashimoto's disease     Hypercholesteremia     Hypertension     Plantar fascia syndrome     PONV (postoperative nausea and vomiting)     Seasonal allergies     Urge incontinence       Past Surgical History:   Procedure Laterality Date     SECTION      x2    CHOLECYSTECTOMY      COLONOSCOPY N/A 2023    Procedure: COLONOSCOPY WITH ANESTHESIA;  Surgeon: Uzma Newberry MD;  Location: [facility] ENDOSCOPY;  Service: Gastroenterology;  Laterality: N/A;  preop; screening   postop  PCP Jacob Simms    SKIN LESION EXCISION      basal cell carcinoma under left eye      Social History     Socioeconomic History    Marital status:    Tobacco Use    Smoking status: Never    Smokeless tobacco: Never   Vaping Use    Vaping status: Never Used   Substance and Sexual Activity    Alcohol use: Yes     Comment: occisonally    Drug use: No    Sexual activity: Not Currently     Partners: Male     Birth control/protection: I.U.D.     Comment: Mirena - dedra 2017      Family  "History   Problem Relation Age of Onset    Diabetes Mother     Cancer Mother         kidney    Kidney disease Mother     Hyperlipidemia Mother     Cancer Father         skin    Hyperlipidemia Father     Breast cancer Neg Hx     Ovarian cancer Neg Hx     Colon cancer Neg Hx     Colon polyps Neg Hx        Objective   Vital Signs  /78   Pulse 92   Resp 16   Ht 160 cm (63\")   Wt 91.9 kg (202 lb 9.6 oz)   SpO2 98%   Breastfeeding No   BMI 35.89 kg/m²    Physical Exam  Vitals reviewed.   Constitutional:       General: She is not in acute distress.     Appearance: Normal appearance. She is obese.   Neck:      Vascular: No carotid bruit.   Cardiovascular:      Rate and Rhythm: Normal rate and regular rhythm.      Pulses:           Dorsalis pedis pulses are 2+ on the right side and 2+ on the left side.        Posterior tibial pulses are 2+ on the right side and 2+ on the left side.   Pulmonary:      Effort: Pulmonary effort is normal. No respiratory distress.      Breath sounds: Normal breath sounds.   Musculoskeletal:         General: Tenderness present.      Right lower leg: No edema.      Left lower leg: No edema.   Neurological:      Mental Status: She is alert.       Assessment & Plan   Assessment & Plan  1. Left-sided sciatica.  A 13-day dexamethasone pack has been prescribed. She is advised to continue with her ongoing therapy. Gabapentin 100 mg is to be taken 3 times daily for 1 month.    2. Hypertension, essential  -chronic/stable, continue prinzide  CBC, Cmp, tsh, lipis    3.  Health Maint.   -Flu vaccine today.    Follow-up:  The patient will Return for follow-up as needed.    Records and Results Reviewed:  I reviewed current medications as given by patient and allergy list    Class 2 Severe Obesity (BMI >=35 and <=39.9). Obesity-related health conditions include the following: hypertension. Obesity is unchanged. BMI is is above average; BMI management plan is completed. We discussed portion control, " increasing exercise, and Information on healthy weight added to patient's after visit summary.    : [employer] and Dragon Speech Recognition - No recording technology was used in the exam room during encounter.    Electronically signed by HU Emerson, 01/14/25, 10:57 PM CST.

## 2025-01-21 ENCOUNTER — TELEPHONE (OUTPATIENT)
Dept: FAMILY MEDICINE CLINIC | Facility: CLINIC | Age: 50
End: 2025-01-21
Payer: COMMERCIAL

## 2025-01-21 RX ORDER — GABAPENTIN 100 MG/1
100 CAPSULE ORAL 3 TIMES DAILY
Qty: 90 CAPSULE | Refills: 0 | OUTPATIENT
Start: 2025-01-21

## 2025-01-21 NOTE — TELEPHONE ENCOUNTER
Physical therapy called stating they have noticed new upper motor neuron signs and would like for lizette to know.

## 2025-01-21 NOTE — TELEPHONE ENCOUNTER
Can we obtain the therapy notes from this observation?  It is just that they are more than 1 neuron sign-I would need to know what the patient is experiencing.    Electronically signed by HU Emerson, 01/21/25, 9:01 AM CST.

## 2025-01-30 ENCOUNTER — HOSPITAL ENCOUNTER (OUTPATIENT)
Dept: MRI IMAGING | Facility: HOSPITAL | Age: 50
Discharge: HOME | End: 2025-01-30
Admitting: NURSE PRACTITIONER
Payer: COMMERCIAL

## 2025-01-30 DIAGNOSIS — M54.32 LEFT SCIATIC NERVE PAIN: ICD-10-CM

## 2025-01-30 PROCEDURE — 72148 MRI LUMBAR SPINE W/O DYE: CPT

## 2025-01-30 NOTE — PROGRESS NOTES
Reviewed results - [employer] message sent.  If not seen in 3 days (3 day alert set), will send to pool to call the message.      Electronically signed by HU Emerson, 01/30/25, 11:06 AM CST.

## 2025-02-04 ENCOUNTER — PATIENT MESSAGE (OUTPATIENT)
Dept: FAMILY MEDICINE CLINIC | Facility: CLINIC | Age: 50
End: 2025-02-04
Payer: COMMERCIAL

## 2025-02-04 LAB
ALBUMIN SERPL-MCNC: 4.1 G/DL (ref 3.5–5.2)
ALBUMIN/GLOB SERPL: 1.5 G/DL
ALP SERPL-CCNC: 106 U/L (ref 39–117)
ALT SERPL-CCNC: 23 U/L (ref 1–33)
AST SERPL-CCNC: 17 U/L (ref 1–32)
BASOPHILS # BLD AUTO: 0.05 10*3/MM3 (ref 0–0.2)
BASOPHILS NFR BLD AUTO: 0.7 % (ref 0–1.5)
BILIRUB SERPL-MCNC: 0.2 MG/DL (ref 0–1.2)
BUN SERPL-MCNC: 9 MG/DL (ref 6–20)
BUN/CREAT SERPL: 8.6 (ref 7–25)
CALCIUM SERPL-MCNC: 9 MG/DL (ref 8.6–10.5)
CHLORIDE SERPL-SCNC: 104 MMOL/L (ref 98–107)
CHOLEST SERPL-MCNC: 361 MG/DL (ref 0–200)
CO2 SERPL-SCNC: 24.7 MMOL/L (ref 22–29)
CREAT SERPL-MCNC: 1.05 MG/DL (ref 0.57–1)
EGFRCR SERPLBLD CKD-EPI 2021: 66.1 ML/MIN/1.73
EOSINOPHIL # BLD AUTO: 0.21 10*3/MM3 (ref 0–0.4)
EOSINOPHIL NFR BLD AUTO: 2.9 % (ref 0.3–6.2)
ERYTHROCYTE [DISTWIDTH] IN BLOOD BY AUTOMATED COUNT: 15.8 % (ref 12.3–15.4)
GLOBULIN SER CALC-MCNC: 2.7 GM/DL
GLUCOSE SERPL-MCNC: 108 MG/DL (ref 65–99)
HCT VFR BLD AUTO: 38.7 % (ref 34–46.6)
HDLC SERPL-MCNC: 41 MG/DL (ref 40–60)
HGB BLD-MCNC: 12.1 G/DL (ref 12–15.9)
IMM GRANULOCYTES # BLD AUTO: 0.08 10*3/MM3 (ref 0–0.05)
IMM GRANULOCYTES NFR BLD AUTO: 1.1 % (ref 0–0.5)
LDLC SERPL CALC-MCNC: 251 MG/DL (ref 0–100)
LYMPHOCYTES # BLD AUTO: 2.03 10*3/MM3 (ref 0.7–3.1)
LYMPHOCYTES NFR BLD AUTO: 28.1 % (ref 19.6–45.3)
MCH RBC QN AUTO: 25.8 PG (ref 26.6–33)
MCHC RBC AUTO-ENTMCNC: 31.3 G/DL (ref 31.5–35.7)
MCV RBC AUTO: 82.5 FL (ref 79–97)
MONOCYTES # BLD AUTO: 0.39 10*3/MM3 (ref 0.1–0.9)
MONOCYTES NFR BLD AUTO: 5.4 % (ref 5–12)
NEUTROPHILS # BLD AUTO: 4.47 10*3/MM3 (ref 1.7–7)
NEUTROPHILS NFR BLD AUTO: 61.8 % (ref 42.7–76)
NRBC BLD AUTO-RTO: 0 /100 WBC (ref 0–0.2)
PLATELET # BLD AUTO: 237 10*3/MM3 (ref 140–450)
POTASSIUM SERPL-SCNC: 4.2 MMOL/L (ref 3.5–5.2)
PROT SERPL-MCNC: 6.8 G/DL (ref 6–8.5)
RBC # BLD AUTO: 4.69 10*6/MM3 (ref 3.77–5.28)
SODIUM SERPL-SCNC: 141 MMOL/L (ref 136–145)
TRIGL SERPL-MCNC: 315 MG/DL (ref 0–150)
TSH SERPL DL<=0.005 MIU/L-ACNC: 4.5 UIU/ML (ref 0.27–4.2)
VLDLC SERPL CALC-MCNC: 69 MG/DL (ref 5–40)
WBC # BLD AUTO: 7.23 10*3/MM3 (ref 3.4–10.8)

## 2025-02-04 RX ORDER — ROSUVASTATIN CALCIUM 20 MG/1
20 TABLET, COATED ORAL NIGHTLY
Qty: 30 TABLET | Refills: 5 | Status: SHIPPED | OUTPATIENT
Start: 2025-02-04

## 2025-02-05 RX ORDER — GABAPENTIN 100 MG/1
100 CAPSULE ORAL 3 TIMES DAILY
Qty: 90 CAPSULE | Refills: 3 | Status: SHIPPED | OUTPATIENT
Start: 2025-02-05

## 2025-02-07 ENCOUNTER — PATIENT MESSAGE (OUTPATIENT)
Dept: FAMILY MEDICINE CLINIC | Facility: CLINIC | Age: 50
End: 2025-02-07
Payer: COMMERCIAL

## 2025-02-07 ENCOUNTER — HOSPITAL ENCOUNTER (OUTPATIENT)
Dept: MRI IMAGING | Facility: HOSPITAL | Age: 50
Discharge: HOME | End: 2025-02-07
Admitting: NURSE PRACTITIONER
Payer: COMMERCIAL

## 2025-02-07 DIAGNOSIS — M51.9 LUMBAR DISC LESION: Primary | ICD-10-CM

## 2025-02-07 DIAGNOSIS — M89.9 LESION OF LUMBAR SPINE: ICD-10-CM

## 2025-02-07 LAB — CREAT BLDA-MCNC: 0.8 MG/DL (ref 0.6–1.3)

## 2025-02-07 PROCEDURE — A9579 GAD-BASE MR CONTRAST NOS,1ML: HCPCS | Performed by: NURSE PRACTITIONER

## 2025-02-07 PROCEDURE — 82565 ASSAY OF CREATININE: CPT

## 2025-02-07 PROCEDURE — 25510000001 GADOPICLENOL 0.5 MMOL/ML SOLUTION: Performed by: NURSE PRACTITIONER

## 2025-02-07 PROCEDURE — 72158 MRI LUMBAR SPINE W/O & W/DYE: CPT

## 2025-02-07 RX ADMIN — GADOPICLENOL 9 ML: 485.1 INJECTION INTRAVENOUS at 13:51

## 2025-02-07 NOTE — PROGRESS NOTES
Attempted to reach patient, no answer. LVM for patient to return call or review results via [employer]

## 2025-02-07 NOTE — PROGRESS NOTES
Reviewed results - [employer] message sent.  If not seen in 3 days (3 day alert set), will send to pool to call the message.      Electronically signed by HU Emerson, 02/07/25, 3:27 PM CST.

## 2025-02-11 ENCOUNTER — TELEPHONE (OUTPATIENT)
Dept: NEUROSURGERY | Facility: CLINIC | Age: 50
End: 2025-02-11
Payer: COMMERCIAL

## 2025-02-13 ENCOUNTER — HOSPITAL ENCOUNTER (OUTPATIENT)
Dept: MRI IMAGING | Facility: HOSPITAL | Age: 50
Discharge: HOME | End: 2025-02-13
Admitting: NURSE PRACTITIONER
Payer: COMMERCIAL

## 2025-02-13 DIAGNOSIS — G89.29 CHRONIC NECK PAIN: ICD-10-CM

## 2025-02-13 DIAGNOSIS — M54.2 CHRONIC NECK PAIN: ICD-10-CM

## 2025-02-13 PROCEDURE — 72141 MRI NECK SPINE W/O DYE: CPT

## 2025-02-14 ENCOUNTER — OFFICE VISIT (OUTPATIENT)
Dept: NEUROSURGERY | Facility: CLINIC | Age: 50
End: 2025-02-14
Payer: COMMERCIAL

## 2025-02-14 VITALS — WEIGHT: 200 LBS | BODY MASS INDEX: 35.44 KG/M2 | HEIGHT: 63 IN

## 2025-02-14 DIAGNOSIS — E66.812 CLASS 2 SEVERE OBESITY DUE TO EXCESS CALORIES WITH SERIOUS COMORBIDITY AND BODY MASS INDEX (BMI) OF 35.0 TO 35.9 IN ADULT: ICD-10-CM

## 2025-02-14 DIAGNOSIS — M54.16 LUMBAR RADICULOPATHY: Primary | ICD-10-CM

## 2025-02-14 DIAGNOSIS — E66.01 CLASS 2 SEVERE OBESITY DUE TO EXCESS CALORIES WITH SERIOUS COMORBIDITY AND BODY MASS INDEX (BMI) OF 35.0 TO 35.9 IN ADULT: ICD-10-CM

## 2025-02-14 DIAGNOSIS — D49.2 NERVE SHEATH TUMOR: ICD-10-CM

## 2025-02-14 DIAGNOSIS — M50.20 DISPLACEMENT OF CERVICAL INTERVERTEBRAL DISC WITHOUT MYELOPATHY: ICD-10-CM

## 2025-02-14 RX ORDER — ONDANSETRON 4 MG/1
4 TABLET, ORALLY DISINTEGRATING ORAL EVERY 8 HOURS PRN
COMMUNITY
Start: 2024-12-07

## 2025-02-14 RX ORDER — DICLOFENAC SODIUM 75 MG/1
75 TABLET, DELAYED RELEASE ORAL 2 TIMES DAILY
Qty: 60 TABLET | Refills: 2 | Status: SHIPPED | OUTPATIENT
Start: 2025-02-14

## 2025-02-14 NOTE — PROGRESS NOTES
Ramez, attached is patient's MRI c-spine - disc protrusion.      Reviewed results - [employer] message sent.  If not seen in 3 days (3 day alert set), will send to pool to call the message.      Electronically signed by HU Emerson, 02/14/25, 12:51 PM CST.

## 2025-02-14 NOTE — PROGRESS NOTES
Chief complaint:   Chief Complaint   Patient presents with    Abnormal Imaging     New patient ref by Ronni Joseph NP for abnormal MRI, back pain started in December       Subjective     HPI: This is a 49-year-old female patient was referred to us by HU Childress for back and leg pain.  She is here to be evaluated today.  The patient said that she started having symptoms in December 2024.  Prior to this she was not having any back pain.  Currently the pain in her back is intermittent.  It is worse with sitting and better with heating and laying down.  She does complain of some pain that goes over into her left lower extremity in a lateral radicular fashion to her knee.  This pain is intermittent.  She also does describe intermittent numbness and tingling in her left foot.  This has the same modifying factors as her back.  Denies any bowel incontinence but does relate to stress incontinence..  Her back is her biggest complaint.  She did go through 4 sessions of therapy at [facility] in December without any significant improvement and felt like it was making her worse.  She has not done any chiropractic care pain management injections.  She is taking gabapentin with some improvement.  She is right-hand dominant.  She works as a counselor.  Denies any tobacco.  She does use marijuana occasionally.  She does drink alcohol occasionally.    Patient also does complain of some pain in her trapezius, deltoid region on the left side as well.  This only goes to her shoulder and does not complain of any pain down into her left arm but does have some occasional numbness and tingling.  Denies any neck pain.    Review of Systems   Constitutional:  Positive for activity change.   Musculoskeletal:  Positive for arthralgias, back pain, myalgias and neck pain.   All other systems reviewed and are negative.       Past Medical History:   Diagnosis Date    Abnormal Pap smear of cervix     Anxiety     Asthma     Basal  "cell carcinoma (BCC) in situ of skin     under left eye    Bunion, right     Cystitis     Disease of thyroid gland     Hashimoto's disease     Hypercholesteremia     Hypertension     Plantar fascia syndrome     PONV (postoperative nausea and vomiting)     Seasonal allergies     Urge incontinence      Past Surgical History:   Procedure Laterality Date     SECTION      x2    CHOLECYSTECTOMY      COLONOSCOPY N/A 2023    Procedure: COLONOSCOPY WITH ANESTHESIA;  Surgeon: Uzma Newberry MD;  Location: [facility] ENDOSCOPY;  Service: Gastroenterology;  Laterality: N/A;  preop; screening   postop  PCP Jacob Simms    SKIN LESION EXCISION      basal cell carcinoma under left eye     Family History   Problem Relation Age of Onset    Diabetes Mother     Cancer Mother         kidney    Kidney disease Mother     Hyperlipidemia Mother     Cancer Father         skin    Hyperlipidemia Father     Breast cancer Neg Hx     Ovarian cancer Neg Hx     Colon cancer Neg Hx     Colon polyps Neg Hx      Social History     Tobacco Use    Smoking status: Never    Smokeless tobacco: Never   Vaping Use    Vaping status: Never Used   Substance Use Topics    Alcohol use: Yes     Comment: occisonally    Drug use: No     (Not in a hospital admission)    Allergies:  Codeine    Objective      Vital Signs  Ht 160 cm (63\")   Wt 90.7 kg (200 lb)   BMI 35.43 kg/m²     Physical Exam  Constitutional:       General: She is awake.      Appearance: Normal appearance. She is well-developed.   HENT:      Head: Normocephalic.   Eyes:      General: Lids are normal.      Extraocular Movements: Extraocular movements intact.      Conjunctiva/sclera: Conjunctivae normal.      Pupils: Pupils are equal, round, and reactive to light.   Pulmonary:      Effort: Pulmonary effort is normal.      Breath sounds: Normal breath sounds.   Musculoskeletal:         General: Normal range of motion.      Cervical back: Normal range of motion.   Skin:     General: Skin is " warm.   Neurological:      Mental Status: She is alert and oriented to person, place, and time.      GCS: GCS eye subscore is 4. GCS verbal subscore is 5. GCS motor subscore is 6.      Cranial Nerves: No cranial nerve deficit.      Sensory: No sensory deficit.      Motor: Motor strength is normal.     Deep Tendon Reflexes: Reflexes are normal and symmetric. Reflexes normal.   Psychiatric:         Speech: Speech normal.         Behavior: Behavior normal.         Thought Content: Thought content normal.         Neurological Exam  Mental Status  Awake and alert. Oriented to person, place and time. Oriented to person, place, and time. Speech is normal. Language is fluent with no aphasia. Attention and concentration are normal.    Cranial Nerves  CN I: Sense of smell is normal.  CN II: Right normal visual field. Left normal visual field.  CN III, IV, VI: Extraocular movements intact bilaterally. Normal lids and orbits bilaterally. Pupils equal round and reactive to light bilaterally.  CN V: Facial sensation is normal.  CN VII:  Right: There is no facial weakness.  Left: There is no facial weakness.  CN XI: Shoulder shrug strength is normal.  CN XII: Tongue midline without atrophy or fasciculations.    Motor  Normal muscle bulk throughout. Normal muscle tone. Strength is 5/5 throughout all four extremities.    Sensory  Sensation is intact to light touch, pinprick, vibration and proprioception in all four extremities.    Reflexes  Deep tendon reflexes are 2+ and symmetric in all four extremities.    Gait  Normal casual, toe, heel and tandem gait.       Imaging review: MRI of the lumbar spine that was done with and without contrast on February 7, 2025 shows an enhancing lesion on the left foramen at L2-3 surround the exiting nerve root.  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  No fracture visualized.  No cord signal change.  The conus does terminate at L1.  At L5-S1 there is mild bilateral foraminal  narrowing.        MRI of the cervical spine that was done on 3/13/2025 shows loss of the normal cervical lordosis.  At C6/7 a disc bulging slightly off to the left but no significant foraminal narrowing on the exiting nerve root.  No fracture visualized.  No cord signal change.    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  This may be responsible for some of her symptoms.  We are going to have her increase her gabapentin and also start her on diclofenac.  Will also refer her to pain management to try injections in her back to see if this will help with the symptoms that she is dealing with.  In regards to her neck she wants to see how things will go with her back as it is her biggest complaint prior to doing any conservative treatment for the neck.  She was told to call us if any further problems or concerns.  I will have her follow-up Dr. Cleveland the next available appointment.    Patient is a nonsmoker  The patient's Body mass index is 35.43 kg/m².. BMI is above normal parameters. Recommendations include: educational material and nutrition counseling    Diagnoses and all orders for this visit:    1. Lumbar radiculopathy (Primary)  -     Ambulatory Referral to Pain Management Clinic    2. Nerve sheath tumor  -     Ambulatory Referral to Pain Management Clinic    3. Displacement of cervical intervertebral disc without myelopathy    4. Class 2 severe obesity due to excess calories with serious comorbidity and body mass index (BMI) of 35.0 to 35.9 in adult    Other orders  -     diclofenac (VOLTAREN) 75 MG EC tablet; Take 1 tablet by mouth 2 (Two) Times a Day.  Dispense: 60 tablet; Refill: 2          I discussed the patients findings and my recommendations with patient    Ramez Duran, APRN  02/14/25  09:05 CST

## 2025-02-26 DIAGNOSIS — F41.9 ANXIETY: ICD-10-CM

## 2025-02-26 RX ORDER — ALPRAZOLAM 0.5 MG
0.5 TABLET ORAL 3 TIMES DAILY PRN
Qty: 180 TABLET | Refills: 1 | Status: SHIPPED | OUTPATIENT
Start: 2025-02-26

## 2025-02-26 NOTE — TELEPHONE ENCOUNTER
Rx Refill Note  Requested Prescriptions     Pending Prescriptions Disp Refills    ALPRAZolam (XANAX) 0.5 MG tablet [Pharmacy Med Name: ALPRAZOLAM 0.5MG TABLET] 180 tablet 1     Sig: TAKE 1 TABLET BY MOUTH 3 (THREE) TIMES A DAY AS NEEDED FOR ANXIETY.      Last office visit with office: 01/08/2025  Next office visit with office: NONE    UDS: NONE    DATE OF LAST REFILL: 01/21/2025    Controlled Substance Agreement: not up to date    ITMI OR DEVON: BERNARDA         {TIP  Is Refill Pharmacy correct?:  Melisa Armas MA  02/26/25, 14:20 CST

## 2025-03-12 ENCOUNTER — HOSPITAL ENCOUNTER (OUTPATIENT)
Dept: MAMMOGRAPHY | Facility: HOSPITAL | Age: 50
Discharge: HOME | End: 2025-03-12
Admitting: OBSTETRICS & GYNECOLOGY
Payer: COMMERCIAL

## 2025-03-12 ENCOUNTER — OFFICE VISIT (OUTPATIENT)
Age: 50
End: 2025-03-12
Payer: COMMERCIAL

## 2025-03-12 VITALS
DIASTOLIC BLOOD PRESSURE: 84 MMHG | SYSTOLIC BLOOD PRESSURE: 118 MMHG | WEIGHT: 202 LBS | HEIGHT: 63 IN | BODY MASS INDEX: 35.79 KG/M2

## 2025-03-12 DIAGNOSIS — Z78.9 NON-SMOKER: ICD-10-CM

## 2025-03-12 DIAGNOSIS — N32.81 OAB (OVERACTIVE BLADDER): Primary | ICD-10-CM

## 2025-03-12 PROCEDURE — 77063 BREAST TOMOSYNTHESIS BI: CPT

## 2025-03-12 PROCEDURE — 77067 SCR MAMMO BI INCL CAD: CPT

## 2025-03-12 RX ORDER — VIBEGRON 75 MG/1
75 TABLET, FILM COATED ORAL DAILY
Qty: 30 TABLET | Refills: 3 | Status: SHIPPED | OUTPATIENT
Start: 2025-03-12

## 2025-03-12 NOTE — PROGRESS NOTES
"    Chief Complaint  Pelvic Pain (Pt here to review u/s from today for pelvic pain/pressure and OAB, pt wanted to see you prior to having her u/s done, she reports the incontinence is worsened by alcohol and caffeine and happens every morning or night or if she takes a nap but during the day with laughing, coughing, or sneezing but not often)    Subjective        Marjorie Banrett presents to [facility]  History of Present Illness    Patient presents today for follow-up  Her pelvic pain has resolved  However, she is having worsening incontinence  She has been on Vesicare for quite some time  For a while, she did well  However, it does not seem to be working anymore  Most of the sounds like urge incontinence but she does have occasional episodes of stress incontinence but this is not her primary concern    Objective   Vital Signs:  /84 (BP Location: Right arm, Patient Position: Sitting, Cuff Size: Large Adult)   Ht 160 cm (63\")   Wt 91.6 kg (202 lb)   BMI 35.78 kg/m²   Estimated body mass index is 35.78 kg/m² as calculated from the following:    Height as of this encounter: 160 cm (63\").    Weight as of this encounter: 91.6 kg (202 lb).            Physical Exam  Constitutional:       General: She is not in acute distress.     Appearance: Normal appearance. She is not toxic-appearing.   HENT:      Head: Normocephalic and atraumatic.      Nose: Nose normal.   Neurological:      General: No focal deficit present.      Mental Status: She is alert.   Psychiatric:         Mood and Affect: Mood normal.         Behavior: Behavior normal.         Thought Content: Thought content normal.         Judgment: Judgment normal.        Result Review :                Assessment and Plan   Diagnoses and all orders for this visit:    1. OAB (overactive bladder) (Primary)  -     Vibegron (Gemtesa) 75 MG tablet; Take 1 tablet by mouth Daily.  Dispense: 30 tablet; Refill: 3    2. Non-smoker             Follow " Up   Return in about 1 month (around 4/12/2025) for with me.  Patient was given instructions and counseling regarding her condition or for health maintenance advice. Please see specific information pulled into the AVS if appropriate.     Trial of Gemtesa  Call for concerns or questions    Roni Looney MD

## 2025-04-01 RX ORDER — SERTRALINE HYDROCHLORIDE 100 MG/1
200 TABLET, FILM COATED ORAL DAILY
Qty: 60 TABLET | Refills: 3 | Status: SHIPPED | OUTPATIENT
Start: 2025-04-01

## 2025-04-14 ENCOUNTER — TELEPHONE (OUTPATIENT)
Age: 50
End: 2025-04-14
Payer: COMMERCIAL

## 2025-04-14 NOTE — TELEPHONE ENCOUNTER
Called to r/s pts appt on 4/16 as Dr. Looney is not in office, no answer, left voicemail to return my call at her earliest convenience.  Okay for HUB, , or clinical staff to inform pt if she returns call.

## 2025-04-14 NOTE — TELEPHONE ENCOUNTER
Name: Marjorie Barnett ABBY    Relationship: Self    Best Callback Number: [phone]      HUB PROVIDED THE RELAY MESSAGE FROM THE OFFICE   PATIENT VOICED UNDERSTANDING AND HAS NO FURTHER QUESTIONS AT THIS TIME    ADDITIONAL INFORMATION:

## 2025-04-30 ENCOUNTER — OFFICE VISIT (OUTPATIENT)
Age: 50
End: 2025-04-30
Payer: COMMERCIAL

## 2025-04-30 VITALS
DIASTOLIC BLOOD PRESSURE: 76 MMHG | BODY MASS INDEX: 34.52 KG/M2 | HEIGHT: 63 IN | SYSTOLIC BLOOD PRESSURE: 124 MMHG | WEIGHT: 194.8 LBS

## 2025-04-30 DIAGNOSIS — N95.1 MENOPAUSAL SYMPTOMS: ICD-10-CM

## 2025-04-30 DIAGNOSIS — N32.81 OAB (OVERACTIVE BLADDER): Primary | ICD-10-CM

## 2025-04-30 DIAGNOSIS — Z78.9 NON-SMOKER: ICD-10-CM

## 2025-04-30 RX ORDER — ESTRADIOL 0.03 MG/D
1 FILM, EXTENDED RELEASE TRANSDERMAL 2 TIMES WEEKLY
Qty: 8 EACH | Refills: 3 | Status: SHIPPED | OUTPATIENT
Start: 2025-05-01

## 2025-04-30 NOTE — PROGRESS NOTES
"Chief Complaint  Follow-up (Pt here for 1mo f/u on OAB after doing trial of Gemtesa and reports ) and Menopause (Pt also c/o hot flashes, night sweats, moodiness, brain fog, and skin and hair differences)    Subjective        Marjorie Barnett presents to [facility]  History of Present Illness    History of Present Illness  The patient is a 49-year-old female who presents for a follow-up on overactive bladder and menopausal symptoms.    Previously, Vesicare was taken with good results, but the effects waned. Gemtesa was prescribed last time, but it was not taken due to its high cost of $300 per month. Currently, the possibility of obtaining a coupon for Gemtesa is being explored. Vesicare has not been resumed as Dr. Ortega's office requires an appointment for a prescription refill, which has not been scheduled. The condition has been relatively stable this month, attributed to a significant reduction in alcohol consumption. However, urinary urgency upon waking persists, necessitating immediate bathroom use. Alcohol consumption exacerbates the symptoms.    Acne, facial redness, mood disturbances, night sweats, and hot flashes are reported, although night sweats and hot flashes have improved compared to previous episodes. Concerns about potential weight gain associated with hormone therapy are expressed. An intrauterine device (IUD) has been in place for approximately 5 to 6 years, and there is uncertainty about identifying the onset of menopause given the presence of the IUD.    CONTRACEPTION:   - Type: Intrauterine device (IUD)  - History: In place for approximately 5 to 6 years  - Menopausal status: Perimenopausal symptoms reported    SOCIAL HISTORY  She has almost completely cut out alcohol.    Objective   Vital Signs:  /76 (BP Location: Right arm, Patient Position: Sitting, Cuff Size: Large Adult)   Ht 160 cm (63\")   Wt 88.4 kg (194 lb 12.8 oz)   BMI 34.51 kg/m²   Estimated body mass " "index is 34.51 kg/m² as calculated from the following:    Height as of this encounter: 160 cm (63\").    Weight as of this encounter: 88.4 kg (194 lb 12.8 oz).    Physical Exam  Constitutional:       General: She is not in acute distress.     Appearance: Normal appearance. She is not toxic-appearing.   HENT:      Head: Normocephalic and atraumatic.      Nose: Nose normal.   Neurological:      General: No focal deficit present.      Mental Status: She is alert.   Psychiatric:         Mood and Affect: Mood normal.         Behavior: Behavior normal.         Thought Content: Thought content normal.         Judgment: Judgment normal.        Result Review :                Assessment and Plan   Diagnoses and all orders for this visit:    1. OAB (overactive bladder) (Primary)    2. Menopausal symptoms    3. Non-smoker    Other orders  -     estradiol (Vivelle-Dot) 0.025 MG/24HR patch; Place 1 patch on the skin as directed by provider 2 (Two) Times a Week.  Dispense: 8 each; Refill: 3        Assessment & Plan  1. Overactive bladder.  - Symptoms have shown improvement with the cessation of alcohol consumption.  - Alcohol is a bladder irritant.  - Trial of Gemtesa will be initiated, pending the availability of a coupon.  - If Gemtesa proves ineffective, reintroduction of Vesicare will be considered.    2. Menopausal symptoms.  - Currently using a Mirena IUD, which provides the necessary progesterone component.  - Potential side effects, including spotting, were discussed.  - Hormone therapy is not a weight loss medication but can be more effective when combined with regular exercise, calorie counting, and a balanced diet.  - Prescription for low-dose estrogen patches will be provided.  - Patches to be applied twice weekly.  - Patch can be placed on various areas such as the shoulders or lower abdomen.  - Patch should be changed every 3 to 4 days.    Follow-up:  - Return visit in a few months to assess progress.         Follow " Up   Return in about 3 months (around 7/30/2025) for Telehealth, with me.  Patient was given instructions and counseling regarding her condition or for health maintenance advice. Please see specific information pulled into the AVS if appropriate.         Roni Looney MD    Patient or patient representative verbalized consent for the use of Ambient Listening during the visit with  Roni Looney MD for chart documentation. 4/30/2025  13:28 CDT

## 2025-05-06 NOTE — TELEPHONE ENCOUNTER
Rx Refill Note  Requested Prescriptions     Pending Prescriptions Disp Refills    Synthroid 100 MCG tablet [Pharmacy Med Name: SYNTHROID 100MCG TABLET] 30 tablet 3     Sig: TAKE 1 TABLET BY MOUTH DAILY.      Last office visit with office: 01/08/25  Next office visit with office: none    UDS:     DATE OF LAST REFILL: 12/19/24    Controlled Substance Agreement:     TIMI OR PRECIOUSP:          {TIP  Is Refill Pharmacy correct?:  Lucy Hernandez MA  05/06/25, 11:47 CDT

## 2025-05-07 RX ORDER — LEVOTHYROXINE SODIUM 100 MCG
100 TABLET ORAL DAILY
Qty: 30 TABLET | Refills: 3 | Status: SHIPPED | OUTPATIENT
Start: 2025-05-07

## 2025-05-21 ENCOUNTER — TELEPHONE (OUTPATIENT)
Dept: NEUROSURGERY | Facility: CLINIC | Age: 50
End: 2025-05-21
Payer: COMMERCIAL

## 2025-05-21 NOTE — TELEPHONE ENCOUNTER
Called to confirm patient's appt with Dr Giang on Thursday 6/5/25 @ 830 am  - no answer so left a message to call me back      MCKAY JAMIL [facility]  CLINICAL COORDINATOR  DR PADDY GIANG  [facility] [place] [facility]        IT IS OKAY FOR THE HUB TO DELIVER THIS INFORMATION TO THE PATIENT IF THEY RECEIVE THIS CALL BACK

## 2025-06-03 RX ORDER — GABAPENTIN 100 MG/1
100 CAPSULE ORAL 3 TIMES DAILY
Qty: 90 CAPSULE | Refills: 3 | Status: SHIPPED | OUTPATIENT
Start: 2025-06-03

## 2025-06-05 ENCOUNTER — OFFICE VISIT (OUTPATIENT)
Dept: NEUROSURGERY | Facility: CLINIC | Age: 50
End: 2025-06-05
Payer: COMMERCIAL

## 2025-06-05 VITALS — WEIGHT: 189 LBS | HEIGHT: 63 IN | BODY MASS INDEX: 33.49 KG/M2

## 2025-06-05 DIAGNOSIS — E66.09 CLASS 1 OBESITY DUE TO EXCESS CALORIES WITH SERIOUS COMORBIDITY AND BODY MASS INDEX (BMI) OF 33.0 TO 33.9 IN ADULT: ICD-10-CM

## 2025-06-05 DIAGNOSIS — M50.20 DISPLACEMENT OF CERVICAL INTERVERTEBRAL DISC WITHOUT MYELOPATHY: ICD-10-CM

## 2025-06-05 DIAGNOSIS — E66.811 CLASS 1 OBESITY DUE TO EXCESS CALORIES WITH SERIOUS COMORBIDITY AND BODY MASS INDEX (BMI) OF 33.0 TO 33.9 IN ADULT: ICD-10-CM

## 2025-06-05 DIAGNOSIS — D49.2 NERVE SHEATH TUMOR: ICD-10-CM

## 2025-06-05 DIAGNOSIS — Z78.9 NON-SMOKER: ICD-10-CM

## 2025-06-05 DIAGNOSIS — M54.16 LUMBAR RADICULOPATHY: Primary | ICD-10-CM

## 2025-06-05 RX ORDER — ONDANSETRON 8 MG/1
TABLET, ORALLY DISINTEGRATING ORAL
COMMUNITY
Start: 2025-05-15

## 2025-06-05 RX ORDER — ROSUVASTATIN CALCIUM 20 MG/1
TABLET, COATED ORAL
COMMUNITY
Start: 2025-06-03

## 2025-06-05 NOTE — PATIENT INSTRUCTIONS
"PATIENT TO CONTINUE TO FOLLOW UP WITH HER PRIMARY CARE PROVIDER FOR YEARLY PHYSICAL EXAMS TO ENSURE COMPLETE HEALTH MAINTENANCE    BMI for Adults  Body mass index (BMI) is a number found using a person's weight and height. BMI can help tell how much of a person's weight is made up of fat. BMI does not measure body fat directly. It is used instead of tests that directly measure body fat, which can be difficult and expensive.  What are BMI measurements used for?  BMI is useful to:  Find out if your weight puts you at higher risk for medical problems.  Help recommend changes, such as in diet and exercise. This can help you reach a healthy weight. BMI screening can be done again to see if these changes are working.  How is BMI calculated?  Your height and weight are measured. The BMI is found from those numbers. This can be done with U.S[place] or metric measurements. Note that charts and online BMI calculators are available to help you find your BMI quickly and easily without doing these calculations.  To calculate your BMI in U.S[place] measurements:  Measure your weight in pounds (lb).  Multiply the number of pounds by 703.  So, for an adult who weighs 150 lb, multiply that number by 703: 150 x 703, which equals 105,450.  Measure your height in inches. Then multiply that number by itself to get a measurement called \"inches squared.\"  So, for an adult who is 70 inches tall, the \"inches squared\" measurement is 70 inches x 70 inches, which equals 4,900 inches squared.  Divide the total from step 2 (number of lb x 703) by the total from step 3 (inches squared): 105,450 ÷ 4,900 = 21.5. This is your BMI.  To calculate your BMI in metric measurements:    Measure your weight in kilograms (kg).  For this example, the weight is 70 kg.  Measure your height in meters (m). Then multiply that number by itself to get a measurement called \"meters squared.\"  So, for an adult who is 1.75 m tall, the \"meters squared\" measurement is 1.75 m x 1.75 " m, which equals 3.1 meters squared.  Divide the number of kilograms (your weight) by the meters squared number. In this example: 70 ÷ 3.1 = 22.6. This is your BMI.  What do the results mean?  BMI charts are used to see if you are underweight, normal weight, overweight, or obese. The following guidelines will be used:  Underweight: BMI less than 18.5.  Normal weight: BMI between 18.5 and 24.9.  Overweight: BMI between 25 and 29.9.  Obese: BMI of 30 or above.  BMI is a tool and cannot diagnose a condition. Talk with your health care provider about what your BMI means for you. Keep these notes in mind:  Weight includes fat and muscle. Someone with a muscular build, such as an athlete, may have a BMI that is higher than 24.9. In cases like these, BMI is not a correct measure of body fat.  If you have a BMI of 25 or higher, your provider may need to do more testing to find out if excess body fat is the cause.  BMI is measured the same way for males and females. Females usually have more body fat than males of the same height and weight.  Where to find more information  For more information about BMI, including tools to quickly find your BMI, go to:  Centers for Disease Control and Prevention: cdc.gov  American Heart Association: heart.org  National Heart, Lung, and Blood [place]: nhlbi.nih.gov  This information is not intended to replace advice given to you by your health care provider. Make sure you discuss any questions you have with your health care provider.  Document Revised: 09/07/2023 Document Reviewed: 08/31/2023  [employer] Patient Education © 2024 [employer]DASH Eating Plan  DASH stands for Dietary Approaches to Stop Hypertension. The DASH eating plan is a healthy eating plan that has been shown to:  Lower high blood pressure (hypertension).  Reduce your risk for type 2 diabetes, heart disease, and stroke.  Help with weight loss.  What are tips for following this plan?  Reading food labels  Check food labels  "for the amount of salt (sodium) per serving. Choose foods with less than 5 percent of the Daily Value (DV) of sodium. In general, foods with less than 300 milligrams (mg) of sodium per serving fit into this eating plan.  To find whole grains, look for the word \"whole\" as the first word in the ingredient list.  Shopping  Buy products labeled as \"low-sodium\" or \"no salt added.\"  Buy fresh foods. Avoid canned foods and pre-made or frozen meals.  Cooking  Try not to add salt when you cook. Use salt-free seasonings or herbs instead of table salt or sea salt. Check with your health care provider or pharmacist before using salt substitutes.  Do not arias foods. Cook foods in healthy ways, such as baking, boiling, grilling, roasting, or broiling.  Cook using oils that are good for your heart. These include olive, canola, avocado, soybean, and sunflower oil.  Meal planning    Eat a balanced diet. This should include:  4 or more servings of fruits and 4 or more servings of vegetables each day. Try to fill half of your plate with fruits and vegetables.  6-8 servings of whole grains each day.  6 or less servings of lean meat, poultry, or fish each day. 1 oz is 1 serving. A 3 oz (85 g) serving of meat is about the same size as the palm of your hand. One egg is 1 oz (28 g).  2-3 servings of low-fat dairy each day. One serving is 1 cup (237 mL).  1 serving of nuts, seeds, or beans 5 times each week.  2-3 servings of heart-healthy fats. Healthy fats called omega-3 fatty acids are found in foods such as walnuts, flaxseeds, fortified milks, and eggs. These fats are also found in cold-water fish, such as sardines, salmon, and mackerel.  Limit how much you eat of:  Canned or prepackaged foods.  Food that is high in trans fat, such as fried foods.  Food that is high in saturated fat, such as fatty meat.  Desserts and other sweets, sugary drinks, and other foods with added sugar.  Full-fat dairy products.  Do not salt foods before " eating.  Do not eat more than 4 egg yolks a week.  Try to eat at least 2 vegetarian meals a week.  Eat more home-cooked food and less restaurant, buffet, and fast food.  Lifestyle  When eating at a restaurant, ask if your food can be made with less salt or no salt.  If you drink alcohol:  Limit how much you have to:  0-1 drink a day if you are female.  0-2 drinks a day if you are male.  Know how much alcohol is in your drink. In the [place] one drink is one 12 oz bottle of beer (355 mL), one 5 oz glass of wine (148 mL), or one 1½ oz glass of hard liquor (44 mL).  General information  Avoid eating more than 2,300 mg of salt a day. If you have hypertension, you may need to reduce your sodium intake to 1,500 mg a day.  Work with your provider to stay at a healthy body weight or lose weight. Ask what the best weight range is for you.  On most days of the week, get at least 30 minutes of exercise that causes your heart to beat faster. This may include walking, swimming, or biking.  Work with your provider or dietitian to adjust your eating plan to meet your specific calorie needs.  What foods should I eat?  Fruits  All fresh, dried, or frozen fruit. Canned fruits that are in their natural juice and do not have sugar added to them.  Vegetables  Fresh or frozen vegetables that are raw, steamed, roasted, or grilled. Low-sodium or reduced-sodium tomato and vegetable juice. Low-sodium or reduced-sodium tomato sauce and tomato paste. Low-sodium or reduced-sodium canned vegetables.  Grains  Whole-grain or whole-wheat bread. Whole-grain or whole-wheat pasta. Brown rice. Oatmeal. Quinoa. Bulgur. Whole-grain and low-sodium cereals. Linda bread. Low-fat, low-sodium crackers. Whole-wheat flour tortillas.  Meats and other proteins  Skinless chicken or turkey. Ground chicken or turkey. Pork with fat trimmed off. Fish and seafood. Egg whites. Dried beans, peas, or lentils. Unsalted nuts, nut butters, and seeds. Unsalted canned beans. Lean  cuts of beef with fat trimmed off. Low-sodium, lean precooked or cured meat, such as sausages or meat loaves.  Dairy  Low-fat (1%) or fat-free (skim) milk. Reduced-fat, low-fat, or fat-free cheeses. Nonfat, low-sodium ricotta or cottage cheese. Low-fat or nonfat yogurt. Low-fat, low-sodium cheese.  Fats and oils  Soft margarine without trans fats. Vegetable oil. Reduced-fat, low-fat, or light mayonnaise and salad dressings (reduced-sodium). Canola, safflower, olive, avocado, soybean, and sunflower oils. Avocado.  Seasonings and condiments  Herbs. Spices. Seasoning mixes without salt.  Other foods  Unsalted popcorn and pretzels. Fat-free sweets.  The items listed above may not be all the foods and drinks you can have. Talk to a dietitian to learn more.  What foods should I avoid?  Fruits  Canned fruit in a light or heavy syrup. Fried fruit. Fruit in cream or butter sauce.  Vegetables  Creamed or fried vegetables. Vegetables in a cheese sauce. Regular canned vegetables that are not marked as low-sodium or reduced-sodium. Regular canned tomato sauce and paste that are not marked as low-sodium or reduced-sodium. Regular tomato and vegetable juices that are not marked as low-sodium or reduced-sodium. Pickles. Olives.  Grains  Baked goods made with fat, such as croissants, muffins, or some breads. Dry pasta or rice meal packs.  Meats and other proteins  Fatty cuts of meat. Ribs. Fried meat. Nunn. Bologna, salami, and other precooked or cured meats, such as sausages or meat loaves, that are not lean and low in sodium. Fat from the back of a pig (fatback). Bratwurst. Salted nuts and seeds. Canned beans with added salt. Canned or smoked fish. Whole eggs or egg yolks. Chicken or turkey with skin.  Dairy  Whole or 2% milk, cream, and half-and-half. Whole or full-fat cream cheese. Whole-fat or sweetened yogurt. Full-fat cheese. Nondairy creamers. Whipped toppings. Processed cheese and cheese spreads.  Fats and oils  Butter.  Stick margarine. Lard. Shortening. Ghee. Nunn fat. Tropical oils, such as coconut, palm kernel, or palm oil.  Seasonings and condiments  Onion salt, garlic salt, seasoned salt, table salt, and sea salt. Worcestershire sauce. Tartar sauce. Barbecue sauce. Teriyaki sauce. Soy sauce, including reduced-sodium soy sauce. Steak sauce. Canned and packaged gravies. Fish sauce. Oyster sauce. Cocktail sauce. Store-bought horseradish. Ketchup. Mustard. Meat flavorings and tenderizers. Bouillon cubes. Hot sauces. Pre-made or packaged marinades. Pre-made or packaged taco seasonings. Relishes. Regular salad dressings.  Other foods  Salted popcorn and pretzels.  The items listed above may not be all the foods and drinks you should avoid. Talk to a dietitian to learn more.  Where to find more information  National Heart, Lung, and Blood [place] (NHLBI): nhlbi.nih.gov  American Heart Association (AHA): heart.org  Academy of Nutrition and Dietetics: eatright.org  National Kidney Foundation (NKF): kidney.org  This information is not intended to replace advice given to you by your health care provider. Make sure you discuss any questions you have with your health care provider.  Document Revised: 01/04/2024 Document Reviewed: 01/04/2024  [employer] Patient Education © 2024 [employer]

## 2025-06-05 NOTE — PROGRESS NOTES
SUBJECTIVE:  Patient ID: Marjorie Barnett is a 49 y.o. female is here today for follow-up.    Chief Complaint: Back pain  Chief Complaint   Patient presents with    PAIN MGMT FOLLOW UP     Today patient states she saw [facility] in consult but did not have any injections b/c her symptoms have all subsided.    She did therapy without relief ([facility]).       HPI  49-year-old female with the following for left paraspinal back pain and left lower extremity radicular pain.  The pain would radiate down around the left hip and buttock down the anterior portion of the thigh to the knee.  She says the last couple months the pain is subsided and she is not currently having any pain.  She did meet with Dr. Cm but did not get any injections because she was feeling good.  Her pain did come on rather suddenly on [place] when she bent over.    The following portions of the patient's history were reviewed and updated as appropriate: allergies, current medications, past family history, past medical history, past social history, past surgical history and problem list.    OBJECTIVE:    Review of Systems   Musculoskeletal:  Positive for back pain.   All other systems reviewed and are negative.         Physical Exam  Constitutional:       General: She is awake.   Eyes:      Extraocular Movements: Extraocular movements intact.      Pupils: Pupils are equal, round, and reactive to light.   Neurological:      Motor: Motor strength is normal.     Deep Tendon Reflexes: Reflexes are normal and symmetric.   Psychiatric:         Speech: Speech normal.         Neurological Exam  Mental Status  Awake. Oriented to person, place and time. Speech is normal. Language is fluent with no aphasia. Attention and concentration are normal.    Cranial Nerves  CN I: Sense of smell is normal.  CN II: Right normal visual field. Left normal visual field.  CN III, IV, VI: Extraocular movements intact bilaterally. Pupils equal round and  reactive to light bilaterally.  CN V: Facial sensation is normal.  CN VII:  Right: There is no facial weakness.  Left: There is no facial weakness.  CN XI: Shoulder shrug strength is normal.  CN XII: Tongue midline without atrophy or fasciculations.    Motor  Normal muscle bulk throughout. Normal muscle tone. Strength is 5/5 throughout all four extremities.    Sensory  Sensation is intact to light touch, pinprick, vibration and proprioception in all four extremities.    Reflexes  Deep tendon reflexes are 2+ and symmetric in all four extremities.    Gait  Normal casual, toe, heel and tandem gait.      Independent Review of Radiographic Studies:       ASSESSMENT/PLAN:  MRI of the lumbar spine does show a left foraminal narrowing due to mass at L2-3.  Certainly this would be responsible for the pattern of pain that she had.  The radiologist was concerned that this could be a schwannoma.  This also could be a disc herniation in the more acute phase.  We discussed this at length.  I would reimage this with repeat MRI with and without contrast in 6 months.  Should her pain return before that would be happy to see her sooner.      1. Lumbar radiculopathy    2. Displacement of cervical intervertebral disc without myelopathy    3. Nerve sheath tumor    4. Non-smoker    5. Class 1 obesity due to excess calories with serious comorbidity and body mass index (BMI) of 33.0 to 33.9 in adult        The patient's Body mass index is 33.48 kg/m².. BMI is above normal parameters. Recommendations include: educational material    Return in about 6 months (around 12/5/2025) for FOLLOW UP W/SCAN - DR GIANG (SPINE).      Jacob Giang MD

## 2025-07-16 ENCOUNTER — OFFICE VISIT (OUTPATIENT)
Dept: FAMILY MEDICINE CLINIC | Facility: CLINIC | Age: 50
End: 2025-07-16
Payer: COMMERCIAL

## 2025-07-16 VITALS
TEMPERATURE: 96.8 F | BODY MASS INDEX: 33.98 KG/M2 | SYSTOLIC BLOOD PRESSURE: 140 MMHG | OXYGEN SATURATION: 97 % | WEIGHT: 191.8 LBS | DIASTOLIC BLOOD PRESSURE: 96 MMHG | HEART RATE: 77 BPM | HEIGHT: 63 IN

## 2025-07-16 DIAGNOSIS — F41.9 ANXIETY: ICD-10-CM

## 2025-07-16 DIAGNOSIS — E03.9 HYPOTHYROIDISM, UNSPECIFIED TYPE: ICD-10-CM

## 2025-07-16 DIAGNOSIS — E78.2 MIXED HYPERLIPIDEMIA: Primary | ICD-10-CM

## 2025-07-16 PROCEDURE — 99214 OFFICE O/P EST MOD 30 MIN: CPT | Performed by: NURSE PRACTITIONER

## 2025-07-16 RX ORDER — PRAVASTATIN SODIUM 10 MG
10 TABLET ORAL NIGHTLY
Qty: 30 TABLET | Refills: 5 | Status: SHIPPED | OUTPATIENT
Start: 2025-07-16

## 2025-07-16 RX ORDER — BUSPIRONE HYDROCHLORIDE 10 MG/1
10 TABLET ORAL 3 TIMES DAILY
Qty: 90 TABLET | Refills: 5 | Status: SHIPPED | OUTPATIENT
Start: 2025-07-16

## 2025-07-16 NOTE — PROGRESS NOTES
Subjective   Chief Complaint:  Recheck thyroid and cholesterol    History of Present Illness  This is a 49-year-old female-history of hypothyroidism, she is on Synthroid 100 mcg daily.  She is going to get her thyroid levels rechecked today.  Has also mixed hyperlipidemia, reports side effects from Crestor including leg cramps and aches.  She had a significant elevation of LDL on the last set of labs.  Also having some increased anxiety-she attributes her elevated blood pressure to this.  She advises has been eating some salt and junk food.    Past Medical, Surgical, Social, and Family History:  Allergies   Allergen Reactions    Codeine Itching and GI Intolerance     Chest pain      Past Medical History:   Diagnosis Date    Abnormal Pap smear of cervix     Allergic rhinitis     Anemia     Anxiety     Asthma     Basal cell carcinoma (BCC) in situ of skin     under left eye    Bunion, right     Cancer ‘23    Basal cell    Cystitis     Disease of thyroid gland     GERD (gastroesophageal reflux disease)     Hashimoto's disease     HPV (human papilloma virus) infection ‘21    Hypercholesteremia     Hypertension     Low back pain     Plantar fascia syndrome     PONV (postoperative nausea and vomiting)     Seasonal allergies     Urge incontinence     Urinary tract infection       Past Surgical History:   Procedure Laterality Date     SECTION      x2    CHOLECYSTECTOMY      COLONOSCOPY N/A 2023    Procedure: COLONOSCOPY WITH ANESTHESIA;  Surgeon: Uzma Newberry MD;  [facility];  Service: Gastroenterology;  Laterality: N/A;  preop; screening   postop  PCP Jacob Simms    SKIN LESION EXCISION      basal cell carcinoma under left eye      Social History     Socioeconomic History    Marital status:    Tobacco Use    Smoking status: Never     Passive exposure: Never    Smokeless tobacco: Never   Vaping Use    Vaping status: Never Used   Substance and Sexual Activity    Alcohol use: Yes      "Comment: occisonally    Drug use: No    Sexual activity: Not Currently     Partners: Male     Birth control/protection: I.U.D.     Comment: Mirena - approx 2017      Family History   Problem Relation Age of Onset    Diabetes Mother     Cancer Mother         Kidney cancer, Bile duct/liver cancer    Kidney disease Mother         Cancer, partial removal of one kidney    Hyperlipidemia Mother     Anxiety disorder Mother     Cancer Father         Leukemia    Hyperlipidemia Father     Melanoma Father     Cancer Maternal Grandfather         Liver    Thyroid disease Maternal Grandmother     Breast cancer Neg Hx     Ovarian cancer Neg Hx     Colon cancer Neg Hx     Colon polyps Neg Hx        Objective   Vital Signs  /96   Pulse 77   Temp 96.8 °F (36 °C) (Infrared)   Ht 160 cm (63\")   Wt 87 kg (191 lb 12.8 oz)   SpO2 97%   BMI 33.98 kg/m²    Physical Exam  Vitals reviewed.   Constitutional:       General: She is not in acute distress.     Appearance: Normal appearance. She is obese.   Cardiovascular:      Rate and Rhythm: Normal rate and regular rhythm.   Pulmonary:      Effort: Pulmonary effort is normal.      Breath sounds: Normal breath sounds.       Assessment & Plan   Assessment & Plan  1.  Mixed hyperlipidemia  - Discontinue Crestor  - Start pravastatin 10 mg daily  - CMP and lipids today    2.  Anxiety  - Continue Zoloft 200 mg daily  - Start BuSpar 10 mg p.o. 3 times daily    3.  Hypothyroidism unspecified  - TSH and T4 today  - Continue Synthroid until labs come back    4.  Elevated blood pressure reading  - Advised self monitoring-will follow-up and treat the underlying anxiety    Follow-up:  The patient will Return for 1 month reevaluation of mood and blood pressure.    Records and Results Reviewed:  I reviewed current medications as given by patient and allergy list.    : [employer]- and Dragon Speech Recognition - No recording technology was used in the exam room during " encounter.    Electronically signed by HU Emerson, 07/16/25, 9:46 AM CDT.

## 2025-07-17 LAB
ALBUMIN SERPL-MCNC: 4.1 G/DL (ref 3.5–5.2)
ALBUMIN/GLOB SERPL: 1.4 G/DL
ALP SERPL-CCNC: 113 U/L (ref 39–117)
ALT SERPL-CCNC: 28 U/L (ref 1–33)
AST SERPL-CCNC: 24 U/L (ref 1–32)
BILIRUB SERPL-MCNC: 0.3 MG/DL (ref 0–1.2)
BUN SERPL-MCNC: 12 MG/DL (ref 6–20)
BUN/CREAT SERPL: 12.4 (ref 7–25)
CALCIUM SERPL-MCNC: 10 MG/DL (ref 8.6–10.5)
CHLORIDE SERPL-SCNC: 102 MMOL/L (ref 98–107)
CHOLEST SERPL-MCNC: 319 MG/DL (ref 0–200)
CO2 SERPL-SCNC: 26 MMOL/L (ref 22–29)
CREAT SERPL-MCNC: 0.97 MG/DL (ref 0.57–1)
EGFRCR SERPLBLD CKD-EPI 2021: 72.7 ML/MIN/1.73
GLOBULIN SER CALC-MCNC: 3 GM/DL
GLUCOSE SERPL-MCNC: 94 MG/DL (ref 65–99)
HDLC SERPL-MCNC: 66 MG/DL (ref 40–60)
LDLC SERPL CALC-MCNC: 212 MG/DL (ref 0–100)
POTASSIUM SERPL-SCNC: 4.6 MMOL/L (ref 3.5–5.2)
PROT SERPL-MCNC: 7.1 G/DL (ref 6–8.5)
SODIUM SERPL-SCNC: 139 MMOL/L (ref 136–145)
T4 FREE SERPL-MCNC: 0.92 NG/DL (ref 0.92–1.68)
TRIGL SERPL-MCNC: 214 MG/DL (ref 0–150)
TSH SERPL DL<=0.005 MIU/L-ACNC: 5.58 UIU/ML (ref 0.27–4.2)
VLDLC SERPL CALC-MCNC: 41 MG/DL (ref 5–40)

## 2025-07-29 ENCOUNTER — TELEPHONE (OUTPATIENT)
Dept: FAMILY MEDICINE CLINIC | Facility: CLINIC | Age: 50
End: 2025-07-29
Payer: COMMERCIAL

## 2025-07-29 NOTE — TELEPHONE ENCOUNTER
Let patient know I sent in Synthroid 112 mcg on 7/18/2025.  She needs to check with the pharmacy.  And then after that, can we forward to the referral staff to let them know that she wants the referral sent elsewhere?    Electronically signed by HU Emerson, 07/29/25, 7:10 AM CDT.

## 2025-07-30 ENCOUNTER — TELEPHONE (OUTPATIENT)
Dept: FAMILY MEDICINE CLINIC | Facility: CLINIC | Age: 50
End: 2025-07-30
Payer: COMMERCIAL

## 2025-07-30 ENCOUNTER — PATIENT MESSAGE (OUTPATIENT)
Dept: FAMILY MEDICINE CLINIC | Facility: CLINIC | Age: 50
End: 2025-07-30
Payer: COMMERCIAL

## 2025-07-30 ENCOUNTER — OFFICE VISIT (OUTPATIENT)
Age: 50
End: 2025-07-30
Payer: COMMERCIAL

## 2025-07-30 VITALS
HEIGHT: 63 IN | WEIGHT: 195 LBS | SYSTOLIC BLOOD PRESSURE: 132 MMHG | BODY MASS INDEX: 34.55 KG/M2 | DIASTOLIC BLOOD PRESSURE: 88 MMHG

## 2025-07-30 DIAGNOSIS — N95.1 MENOPAUSAL SYMPTOMS: ICD-10-CM

## 2025-07-30 DIAGNOSIS — Z78.9 NON-SMOKER: ICD-10-CM

## 2025-07-30 DIAGNOSIS — L81.9 PIGMENTED SKIN LESION OF SUSPECTED MALIGNANT NATURE: Primary | ICD-10-CM

## 2025-07-30 DIAGNOSIS — N32.81 OAB (OVERACTIVE BLADDER): Primary | ICD-10-CM

## 2025-07-30 RX ORDER — ESTRADIOL 0.05 MG/D
1 PATCH, EXTENDED RELEASE TRANSDERMAL 2 TIMES WEEKLY
Qty: 8 PATCH | Refills: 3 | Status: SHIPPED | OUTPATIENT
Start: 2025-07-31

## 2025-07-30 NOTE — TELEPHONE ENCOUNTER
Can we inquire what the issue or area of concern is?  Probably won't but I need to know that much first.    Electronically signed by HU Emerson, 07/30/25, 4:15 PM CDT.

## 2025-07-30 NOTE — PROGRESS NOTES
"Chief Complaint  Follow-up (Pt is here for 3m follow up on OAB, pt c/o hot flashes )    Subjective        Marjorie Barnett presents to [facility]  History of Present Illness    History of Present Illness  The patient is a 49-year-old female who presents for follow-up on overactive bladder and menopausal symptoms.    She has been experiencing an overactive bladder, initially managed by reducing alcohol intake, which no longer seems effective. She tried Gemtesa but did not find it helpful, though she continues to take it. She is considering the use of a bladder stimulator and suspects elevated inflammation levels. She is curious if her thyroid condition could be contributing to her overactive bladder. She is currently on Synthroid for Hashimoto's disease and inflammation. She was previously under the care of Dr. Thomas and is now seeking an endocrinologist in [facility].    She was started on a patch a month ago and reports no bleeding. She still has the IUD in place and plans to have it removed next year.    GYNECOLOGICAL HISTORY:  Gynecology History discussed    CONTRACEPTION:  IUD in place, planned removal next year    PAST MEDICAL HISTORY:  Hashimoto's disease    Objective   Vital Signs:  /88 (BP Location: Right arm, Patient Position: Sitting, Cuff Size: Adult)   Ht 160 cm (62.99\")   Wt 88.5 kg (195 lb)   BMI 34.55 kg/m²   Estimated body mass index is 34.55 kg/m² as calculated from the following:    Height as of this encounter: 160 cm (62.99\").    Weight as of this encounter: 88.5 kg (195 lb).    Physical Exam  Constitutional:       General: She is not in acute distress.     Appearance: Normal appearance. She is not toxic-appearing.   HENT:      Head: Normocephalic and atraumatic.      Nose: Nose normal.   Neurological:      General: No focal deficit present.      Mental Status: She is alert.   Psychiatric:         Mood and Affect: Mood normal.         Behavior: Behavior normal.         " Thought Content: Thought content normal.         Judgment: Judgment normal.        Result Review :                Assessment and Plan   Diagnoses and all orders for this visit:    1. OAB (overactive bladder) (Primary)    2. Menopausal symptoms  -     estradiol (Vivelle-Dot) 0.05 MG/24HR patch; Place 1 patch on the skin as directed by provider 2 (Two) Times a Week.  Dispense: 8 patch; Refill: 3    3. Non-smoker        Assessment & Plan  1. Overactive bladder.  - No improvement reported with Gemtesa despite continued use.  - Advised to consult endocrinologist regarding thyroid condition's impact on bladder issues. If thyroid management does not alleviate bladder symptoms or if the wait time for endocrinology appointment is too long, she should inform via [employer] message. Referral to Dr. Ortiz, a urologist, or Dr. Soto will be arranged based on preference for wait time. Procedure for neurosacral modulator placement, including trial period and potential for permanent placement, was discussed.    2. Menopausal symptoms.  - No improvement noticed with low-dose estrogen patch started a month ago.  - No bleeding reported. Dosage will be increased to 0.05 mg.  - Prescription for increased dose of estrogen patch to 0.05 mg will be written. If no improvement after a month, it may indicate symptoms are not hormone-related. If significant improvement is noted, dosage may be adjusted to an intermediate level.    Follow-up: A follow-up visit is scheduled in 1 month, which can be conducted via telehealth.         Follow Up   Return in about 1 month (around 8/30/2025) for Telehealth, with me.  Patient was given instructions and counseling regarding her condition or for health maintenance advice. Please see specific information pulled into the AVS if appropriate.         Roni Looney MD    Patient or patient representative verbalized consent for the use of Ambient Listening during the visit with  Roni Looney MD for  chart documentation. 7/30/2025  13:25 CDT

## 2025-08-15 ENCOUNTER — TRANSCRIBE ORDERS (OUTPATIENT)
Dept: ADMINISTRATIVE | Facility: HOSPITAL | Age: 50
End: 2025-08-15
Payer: COMMERCIAL

## 2025-08-15 DIAGNOSIS — E03.9 ACQUIRED HYPOTHYROIDISM: Primary | ICD-10-CM

## 2025-08-20 ENCOUNTER — OFFICE VISIT (OUTPATIENT)
Dept: FAMILY MEDICINE CLINIC | Facility: CLINIC | Age: 50
End: 2025-08-20
Payer: COMMERCIAL

## 2025-08-20 VITALS
BODY MASS INDEX: 34.91 KG/M2 | HEART RATE: 88 BPM | WEIGHT: 197 LBS | HEIGHT: 63 IN | SYSTOLIC BLOOD PRESSURE: 130 MMHG | DIASTOLIC BLOOD PRESSURE: 76 MMHG | OXYGEN SATURATION: 96 %

## 2025-08-20 DIAGNOSIS — M25.50 POLYARTHRALGIA: ICD-10-CM

## 2025-08-20 DIAGNOSIS — F41.9 ANXIETY: Primary | ICD-10-CM

## 2025-08-20 RX ORDER — CELECOXIB 200 MG/1
200 CAPSULE ORAL DAILY
Qty: 30 CAPSULE | Refills: 5 | Status: SHIPPED | OUTPATIENT
Start: 2025-08-20

## 2025-08-22 LAB
ANA SER QL: NEGATIVE
ASO AB SERPL-ACNC: 61.9 IU/ML (ref 0–200)
CRP SERPL-MCNC: 0.55 MG/DL (ref 0–0.5)
ENDOMYSIUM IGA SER QL: NEGATIVE
ERYTHROCYTE [SEDIMENTATION RATE] IN BLOOD BY WESTERGREN METHOD: 10 MM/HR (ref 0–20)
GLIADIN PEPTIDE IGA SER-ACNC: 5 UNITS (ref 0–19)
GLIADIN PEPTIDE IGG SER-ACNC: 2 UNITS (ref 0–19)
IGA SERPL-MCNC: 203 MG/DL (ref 87–352)
RHEUMATOID FACT SERPL-ACNC: 10.5 IU/ML
TTG IGA SER-ACNC: <2 U/ML (ref 0–3)
TTG IGG SER-ACNC: 4 U/ML (ref 0–5)
URATE SERPL-MCNC: 3.8 MG/DL (ref 2.4–5.7)

## 2025-08-27 ENCOUNTER — HOSPITAL ENCOUNTER (OUTPATIENT)
Dept: ULTRASOUND IMAGING | Facility: HOSPITAL | Age: 50
Discharge: HOME | End: 2025-08-27
Payer: COMMERCIAL

## 2025-08-27 DIAGNOSIS — E03.9 ACQUIRED HYPOTHYROIDISM: ICD-10-CM

## 2025-08-27 PROCEDURE — 76536 US EXAM OF HEAD AND NECK: CPT

## (undated) DEVICE — SENSR O2 OXIMAX FNGR A/ 18IN NONSTR

## (undated) DEVICE — YANKAUER,BULB TIP WITH VENT: Brand: ARGYLE

## (undated) DEVICE — CUFF,BP,DISP,1 TUBE,ADULT,HP: Brand: MEDLINE

## (undated) DEVICE — THE CHANNEL CLEANING BRUSH IS A NYLON FLEXI BRUSH ATTACHED TO A FLEXIBLE PLASTIC SHEATH DESIGNED TO SAFELY REMOVE DEBRIS FROM FLEXIBLE ENDOSCOPES.

## (undated) DEVICE — MASK,OXYGEN,MED CONC,ADLT,7' TUB, UC: Brand: PENDING

## (undated) DEVICE — Device: Brand: DEFENDO AIR/WATER/SUCTION AND BIOPSY VALVE